# Patient Record
Sex: FEMALE | Race: ASIAN | NOT HISPANIC OR LATINO | URBAN - METROPOLITAN AREA
[De-identification: names, ages, dates, MRNs, and addresses within clinical notes are randomized per-mention and may not be internally consistent; named-entity substitution may affect disease eponyms.]

---

## 2021-09-30 ENCOUNTER — EMERGENCY (EMERGENCY)
Facility: HOSPITAL | Age: 66
LOS: 1 days | Discharge: HOME | End: 2021-09-30
Attending: STUDENT IN AN ORGANIZED HEALTH CARE EDUCATION/TRAINING PROGRAM | Admitting: STUDENT IN AN ORGANIZED HEALTH CARE EDUCATION/TRAINING PROGRAM
Payer: MEDICAID

## 2021-09-30 VITALS
DIASTOLIC BLOOD PRESSURE: 79 MMHG | HEART RATE: 74 BPM | RESPIRATION RATE: 16 BRPM | TEMPERATURE: 98 F | SYSTOLIC BLOOD PRESSURE: 134 MMHG | OXYGEN SATURATION: 97 %

## 2021-09-30 VITALS
RESPIRATION RATE: 18 BRPM | HEART RATE: 85 BPM | DIASTOLIC BLOOD PRESSURE: 75 MMHG | OXYGEN SATURATION: 98 % | TEMPERATURE: 98 F | SYSTOLIC BLOOD PRESSURE: 132 MMHG

## 2021-09-30 LAB
ALBUMIN SERPL ELPH-MCNC: 4.2 G/DL — SIGNIFICANT CHANGE UP (ref 3.3–5)
ALP SERPL-CCNC: 78 U/L — SIGNIFICANT CHANGE UP (ref 40–120)
ALT FLD-CCNC: 20 U/L — SIGNIFICANT CHANGE UP (ref 4–33)
ANION GAP SERPL CALC-SCNC: 14 MMOL/L — SIGNIFICANT CHANGE UP (ref 7–14)
ANION GAP SERPL CALC-SCNC: 15 MMOL/L — HIGH (ref 7–14)
AST SERPL-CCNC: 22 U/L — SIGNIFICANT CHANGE UP (ref 4–32)
B PERT DNA SPEC QL NAA+PROBE: SIGNIFICANT CHANGE UP
B PERT+PARAPERT DNA PNL SPEC NAA+PROBE: SIGNIFICANT CHANGE UP
BASOPHILS # BLD AUTO: 0 K/UL — SIGNIFICANT CHANGE UP (ref 0–0.2)
BASOPHILS NFR BLD AUTO: 0 % — SIGNIFICANT CHANGE UP (ref 0–2)
BILIRUB SERPL-MCNC: <0.2 MG/DL — SIGNIFICANT CHANGE UP (ref 0.2–1.2)
BORDETELLA PARAPERTUSSIS (RAPRVP): SIGNIFICANT CHANGE UP
BUN SERPL-MCNC: 28 MG/DL — HIGH (ref 7–23)
BUN SERPL-MCNC: 31 MG/DL — HIGH (ref 7–23)
C PNEUM DNA SPEC QL NAA+PROBE: SIGNIFICANT CHANGE UP
CALCIUM SERPL-MCNC: 7.9 MG/DL — LOW (ref 8.4–10.5)
CALCIUM SERPL-MCNC: 9 MG/DL — SIGNIFICANT CHANGE UP (ref 8.4–10.5)
CHLORIDE SERPL-SCNC: 103 MMOL/L — SIGNIFICANT CHANGE UP (ref 98–107)
CHLORIDE SERPL-SCNC: 108 MMOL/L — HIGH (ref 98–107)
CO2 SERPL-SCNC: 18 MMOL/L — LOW (ref 22–31)
CO2 SERPL-SCNC: 19 MMOL/L — LOW (ref 22–31)
CREAT SERPL-MCNC: 1.5 MG/DL — HIGH (ref 0.5–1.3)
CREAT SERPL-MCNC: 1.67 MG/DL — HIGH (ref 0.5–1.3)
EOSINOPHIL # BLD AUTO: 0.22 K/UL — SIGNIFICANT CHANGE UP (ref 0–0.5)
EOSINOPHIL NFR BLD AUTO: 3.4 % — SIGNIFICANT CHANGE UP (ref 0–6)
FLUAV SUBTYP SPEC NAA+PROBE: SIGNIFICANT CHANGE UP
FLUBV RNA SPEC QL NAA+PROBE: SIGNIFICANT CHANGE UP
GLUCOSE SERPL-MCNC: 75 MG/DL — SIGNIFICANT CHANGE UP (ref 70–99)
GLUCOSE SERPL-MCNC: 87 MG/DL — SIGNIFICANT CHANGE UP (ref 70–99)
HADV DNA SPEC QL NAA+PROBE: SIGNIFICANT CHANGE UP
HCOV 229E RNA SPEC QL NAA+PROBE: SIGNIFICANT CHANGE UP
HCOV HKU1 RNA SPEC QL NAA+PROBE: SIGNIFICANT CHANGE UP
HCOV NL63 RNA SPEC QL NAA+PROBE: SIGNIFICANT CHANGE UP
HCOV OC43 RNA SPEC QL NAA+PROBE: SIGNIFICANT CHANGE UP
HCT VFR BLD CALC: 38.5 % — SIGNIFICANT CHANGE UP (ref 34.5–45)
HGB BLD-MCNC: 12.8 G/DL — SIGNIFICANT CHANGE UP (ref 11.5–15.5)
HMPV RNA SPEC QL NAA+PROBE: SIGNIFICANT CHANGE UP
HPIV1 RNA SPEC QL NAA+PROBE: SIGNIFICANT CHANGE UP
HPIV2 RNA SPEC QL NAA+PROBE: SIGNIFICANT CHANGE UP
HPIV3 RNA SPEC QL NAA+PROBE: DETECTED
HPIV4 RNA SPEC QL NAA+PROBE: SIGNIFICANT CHANGE UP
IANC: 2.79 K/UL — SIGNIFICANT CHANGE UP (ref 1.5–8.5)
LYMPHOCYTES # BLD AUTO: 1.93 K/UL — SIGNIFICANT CHANGE UP (ref 1–3.3)
LYMPHOCYTES # BLD AUTO: 29.9 % — SIGNIFICANT CHANGE UP (ref 13–44)
M PNEUMO DNA SPEC QL NAA+PROBE: SIGNIFICANT CHANGE UP
MCHC RBC-ENTMCNC: 28 PG — SIGNIFICANT CHANGE UP (ref 27–34)
MCHC RBC-ENTMCNC: 33.2 GM/DL — SIGNIFICANT CHANGE UP (ref 32–36)
MCV RBC AUTO: 84.2 FL — SIGNIFICANT CHANGE UP (ref 80–100)
MONOCYTES # BLD AUTO: 0.99 K/UL — HIGH (ref 0–0.9)
MONOCYTES NFR BLD AUTO: 15.4 % — HIGH (ref 2–14)
NEUTROPHILS # BLD AUTO: 3.03 K/UL — SIGNIFICANT CHANGE UP (ref 1.8–7.4)
NEUTROPHILS NFR BLD AUTO: 47 % — SIGNIFICANT CHANGE UP (ref 43–77)
PLATELET # BLD AUTO: 192 K/UL — SIGNIFICANT CHANGE UP (ref 150–400)
POTASSIUM SERPL-MCNC: 3.4 MMOL/L — LOW (ref 3.5–5.3)
POTASSIUM SERPL-MCNC: 3.6 MMOL/L — SIGNIFICANT CHANGE UP (ref 3.5–5.3)
POTASSIUM SERPL-SCNC: 3.4 MMOL/L — LOW (ref 3.5–5.3)
POTASSIUM SERPL-SCNC: 3.6 MMOL/L — SIGNIFICANT CHANGE UP (ref 3.5–5.3)
PROT SERPL-MCNC: 7.4 G/DL — SIGNIFICANT CHANGE UP (ref 6–8.3)
RAPID RVP RESULT: DETECTED
RBC # BLD: 4.57 M/UL — SIGNIFICANT CHANGE UP (ref 3.8–5.2)
RBC # FLD: 15 % — HIGH (ref 10.3–14.5)
RSV RNA SPEC QL NAA+PROBE: SIGNIFICANT CHANGE UP
RV+EV RNA SPEC QL NAA+PROBE: SIGNIFICANT CHANGE UP
SARS-COV-2 RNA SPEC QL NAA+PROBE: SIGNIFICANT CHANGE UP
SODIUM SERPL-SCNC: 137 MMOL/L — SIGNIFICANT CHANGE UP (ref 135–145)
SODIUM SERPL-SCNC: 140 MMOL/L — SIGNIFICANT CHANGE UP (ref 135–145)
TROPONIN T, HIGH SENSITIVITY RESULT: 16 NG/L — SIGNIFICANT CHANGE UP
TROPONIN T, HIGH SENSITIVITY RESULT: 16 NG/L — SIGNIFICANT CHANGE UP
WBC # BLD: 6.45 K/UL — SIGNIFICANT CHANGE UP (ref 3.8–10.5)
WBC # FLD AUTO: 6.45 K/UL — SIGNIFICANT CHANGE UP (ref 3.8–10.5)

## 2021-09-30 PROCEDURE — 99285 EMERGENCY DEPT VISIT HI MDM: CPT

## 2021-09-30 PROCEDURE — 71045 X-RAY EXAM CHEST 1 VIEW: CPT | Mod: 26

## 2021-09-30 RX ORDER — ASPIRIN/CALCIUM CARB/MAGNESIUM 324 MG
324 TABLET ORAL ONCE
Refills: 0 | Status: DISCONTINUED | OUTPATIENT
Start: 2021-09-30 | End: 2021-09-30

## 2021-09-30 RX ORDER — SODIUM CHLORIDE 9 MG/ML
1000 INJECTION INTRAMUSCULAR; INTRAVENOUS; SUBCUTANEOUS ONCE
Refills: 0 | Status: COMPLETED | OUTPATIENT
Start: 2021-09-30 | End: 2021-09-30

## 2021-09-30 RX ORDER — POTASSIUM CHLORIDE 20 MEQ
40 PACKET (EA) ORAL ONCE
Refills: 0 | Status: COMPLETED | OUTPATIENT
Start: 2021-09-30 | End: 2021-09-30

## 2021-09-30 RX ADMIN — SODIUM CHLORIDE 1000 MILLILITER(S): 9 INJECTION INTRAMUSCULAR; INTRAVENOUS; SUBCUTANEOUS at 19:37

## 2021-09-30 RX ADMIN — Medication 40 MILLIEQUIVALENT(S): at 19:37

## 2021-09-30 NOTE — ED PROVIDER NOTE - OBJECTIVE STATEMENT
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 Denies any objective fevers, CP (contrary to triage note), abdominal pain, nausea, vomiting, diarrhea, constipation, bloody stools, dysuric symptoms. Denies any prior history of PE/DVT, no recent surg/hosp, denies any calf tenderness/swelling/erythema.  passed away one week ago from "chest infection." No prior history of cardiac disease/stents/MI.

## 2021-09-30 NOTE — ED ADULT NURSE NOTE - NSIMPLEMENTINTERV_GEN_ALL_ED
Implemented All Universal Safety Interventions:  [place] to call system. Call bell, personal items and telephone within reach. Instruct patient to call for assistance. Room bathroom lighting operational. Non-slip footwear when patient is off stretcher. Physically safe environment: no spills, clutter or unnecessary equipment. Stretcher in lowest position, wheels locked, appropriate side rails in place.

## 2021-09-30 NOTE — ED ADULT NURSE NOTE - OBJECTIVE STATEMENT
Patient arrives to room 10, A&Ox4 , ambulatory at baseline , c/o shortness of breath and occasional cough , breathing is even and unlabored, wheezing noted on expiration. Pallor/diaphoresis not noted. Denies chest pain, nausea, vomiting or diarrhea. 18G IV placed in L AC. vital signs as charted. Patient pending CXR, and COVID swab results.  All safety maintained, continuing to monitor.

## 2021-09-30 NOTE — ED PROVIDER NOTE - ATTENDING CONTRIBUTION TO CARE
HISTORY OF PRESENT ILLNESS  Andrey Pereira is a 15 year old male presenting with a cough which is productive of yellow sputum for 3 weeks. Patient does not have a fever. He does not have a sore throat any sinus congestion or fever.    REVIEW OF SYSTEMS  All other body systems have been reviewed with patient and are found to be negative.      PHYSICAL EXAMINATION  Blood pressure 121/76, pulse 92, temperature 97.8 °F (36.6 °C), temperature source Temporal Artery, resp. rate 18, height 6' (1.829 m), weight (!) 102.1 kg, SpO2 97 %.    Patient alert and oriented to person, place and time and is in no acute distress.    HEENT:    Head:  Within normal limits  Eyes:  Within normal limits  Ears:  Tympanic membranes without injection  Nose:  Congestion  Sinuses: No pain over maxillary or frontal sinuses.    Throat:  Normal  Neck:  Shotty nodes  Lungs:  Coarse upper airway sounds, bases clear to auscultation. No use of accessory muscles on respiration.     ASSESSMENT:  1. Bronchitis          PLAN:  See patient instructions and Medications.          
I have personally performed a face to face medical and diagnostic evaluation of the patient. I have discussed with and reviewed the Resident's note and agree with the History, ROS, Physical Exam and MDM unless otherwise indicated. A brief summary of my personal evaluation and impression can be found below.    65yo F hx htn hld dm, migratory polyarthritis chronically on prednisone w/ 4 days fever, chills, cough, sob,  congestion. Entire family had similar sx and  passed away 1 week ago for similar dz concerning family prompting ED eval.  No cp, abd pain, back pain, n/v/d, bloody stools, or prior dvt/PE.  VITALS: Initial triage and subsequent vitals have been reviewed by me.  Gen: Well appearing, NAD, alert, non-toxic  Head: NCAT  HEENT: MMM, normal conjunctiva, anicteric, neck supple  Lung: CTAB, no adventitious sounds  CV: RRR, no murmurs, 2+symmetric peripheral pulses  Abd: soft, NTND, no rebound or guarding, no palpable masses  MSK: No edema, no visible deformities  Neuro: Moving all extremity grossly, following commands appropriately, fluid speech  Skin: Warm and dry, no evidence of rash  Psych: normal mood and affect  Constitution of viral symptoms, well appearing HDS will get labs, XR r/o pna and rvp and reassess

## 2021-09-30 NOTE — ED PROVIDER NOTE - NS ED ROS FT
Constitution: (+) Subjective Fever or chills, No Weight Loss,   Eyes: No visual changes  HEENT: (+) cough, No Discharge, (+) Rhinorrhea, (+) URI symptoms  Cardio: No Chest pain, No Palpitations, (+) Dyspnea  Resp: (+) SOB, No Wheezing  GI: No abdominal pain, No Nausea, No Vomiting, No Constipation, No Diarrhea  : No burning upon urination, trouble urinating, no foul odor from urine  MSK: No Back pain, No Numbness, No Tingling, No Weakness  Neuro: No Headache, Normal Gait  Skin: No rashes, No Bruising, No Swelling

## 2021-09-30 NOTE — ED PROVIDER NOTE - PHYSICAL EXAMINATION
GEN - NAD; non-toxic; A+Ox3, speaking full sentences, steady gait   HENT - NC/AT, No visible Ecchymosis, No Abrasions, No Lac/Tears, MMM, no discharge  EYES - EOMI, no conjunctival pallor, no scleral icterus  NECK - Neck supple, No LAD, No Swelling  PULM - Coarse B/L,  (+) trace exp wheeze; symmetric breath sounds; 100% on RA, No Accessory Muscle Use  CV -  RRR, S1 S2, no murmurs 2+ Pulses B/L UE  GI - (-) Rooney's, (-) Rovsings, (-) McBurneys; NT/ND, soft, no guarding, no rebound, no masses    MSK/EXT- no CVA tenderness; no edema, no gross deformity, warm and well perfused, no calf tenderness/swelling/erythema   SKIN - no rash or bruising  NEUROLOGIC - alert, moving all 4 ext with 5/5 Strength

## 2021-09-30 NOTE — ED ADULT NURSE NOTE - CHIEF COMPLAINT QUOTE
Pt c/o chest pain, shortness of breath, fevers, chills x1 day. Pt diagnosed with flu 4 days ago. PMH DM, HTN. Pt appears comfortable.   Pt son Shamim: [phone]

## 2021-09-30 NOTE — ED PROVIDER NOTE - PROGRESS NOTE DETAILS
Kirk Santiago DO - pt remains well appaering. +RVP for parainfluenza. Pt known to have elevated Cr. Answered all questions, will fu with pcp for repeat blood work this week. Give nreturn precautions.

## 2021-09-30 NOTE — ED PROVIDER NOTE - PATIENT PORTAL LINK FT
You can access the FollowMyHealth Patient Portal offered by [facility] by registering at the following website: [email or web address]. By joining [employer]’s FollowMyHealth portal, you will also be able to view your health information using other applications (apps) compatible with our system.

## 2021-09-30 NOTE — ED PROVIDER NOTE - CLINICAL SUMMARY MEDICAL DECISION MAKING FREE TEXT BOX
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 Exam, presentation, and history concerning for PNA vs. Viral URI - will r/o ACS. Eval is NOT consistent with PE ( no recent surg/hosp, no prior history of, no calf tenderness/swelling/erythema; NOT Hypoxemic, NOT Tachypneic, NOT Tachycardic). Plan: CBC, CMP, EKG, CXR, RVP, Re-eval. VSS, non-toxic.

## 2021-12-10 NOTE — ED ADULT TRIAGE NOTE - NS ED TRIAGE AVPU SCALE
Madelyn Paz  [address] [address]            12/10/2021      Dear Madelyn,    This letter is a reminder that you are due for a colonoscopy. I would like to share some important information about colorectal cancer. Colorectal cancer is a leading cause of cancer death in this country. Colorectal cancer can be stopped in its tracks or detected early with preventative testing. Please call [phone] to schedule an appointment with Dr. Elaine which may be scheduled at [facility] in [place], [facility], or [place].  If you already spoke to someone in the GI Department and have your procedure scheduled you can disregard this letter.       Your good health is important to us-colorectal cancer screening is important for you.     Sincerely,    Dr. Víctor Elaine M.D.  Gastroenterology  [address] [place] [address]   Alert-The patient is alert, awake and responds to voice. The patient is oriented to time, place, and person. The triage nurse is able to obtain subjective information.

## 2024-10-04 ENCOUNTER — INPATIENT (INPATIENT)
Facility: HOSPITAL | Age: 69
LOS: 12 days | Discharge: HOME | End: 2024-10-17
Attending: STUDENT IN AN ORGANIZED HEALTH CARE EDUCATION/TRAINING PROGRAM | Admitting: STUDENT IN AN ORGANIZED HEALTH CARE EDUCATION/TRAINING PROGRAM
Payer: MEDICAID

## 2024-10-04 VITALS
RESPIRATION RATE: 18 BRPM | HEART RATE: 86 BPM | OXYGEN SATURATION: 96 % | WEIGHT: 112.44 LBS | TEMPERATURE: 98 F | SYSTOLIC BLOOD PRESSURE: 77 MMHG | DIASTOLIC BLOOD PRESSURE: 51 MMHG

## 2024-10-04 DIAGNOSIS — R68.89 OTHER GENERAL SYMPTOMS AND SIGNS: ICD-10-CM

## 2024-10-04 DIAGNOSIS — N39.0 URINARY TRACT INFECTION, SITE NOT SPECIFIED: ICD-10-CM

## 2024-10-04 DIAGNOSIS — Z87.39 PERSONAL HISTORY OF OTHER DISEASES OF THE MUSCULOSKELETAL SYSTEM AND CONNECTIVE TISSUE: ICD-10-CM

## 2024-10-04 DIAGNOSIS — I10 ESSENTIAL (PRIMARY) HYPERTENSION: ICD-10-CM

## 2024-10-04 DIAGNOSIS — E87.8 OTHER DISORDERS OF ELECTROLYTE AND FLUID BALANCE, NOT ELSEWHERE CLASSIFIED: ICD-10-CM

## 2024-10-04 DIAGNOSIS — N17.9 ACUTE KIDNEY FAILURE, UNSPECIFIED: ICD-10-CM

## 2024-10-04 DIAGNOSIS — E11.9 TYPE 2 DIABETES MELLITUS WITHOUT COMPLICATIONS: ICD-10-CM

## 2024-10-04 DIAGNOSIS — E78.5 HYPERLIPIDEMIA, UNSPECIFIED: ICD-10-CM

## 2024-10-04 DIAGNOSIS — A41.9 SEPSIS, UNSPECIFIED ORGANISM: ICD-10-CM

## 2024-10-04 LAB
ALBUMIN SERPL ELPH-MCNC: 3.1 G/DL — LOW (ref 3.3–5)
ALP SERPL-CCNC: 86 U/L — SIGNIFICANT CHANGE UP (ref 40–120)
ALT FLD-CCNC: 17 U/L — SIGNIFICANT CHANGE UP (ref 4–33)
ANION GAP SERPL CALC-SCNC: 15 MMOL/L — HIGH (ref 7–14)
ANION GAP SERPL CALC-SCNC: 15 MMOL/L — HIGH (ref 7–14)
ANISOCYTOSIS BLD QL: SLIGHT — SIGNIFICANT CHANGE UP
APPEARANCE UR: ABNORMAL
APTT BLD: 33.3 SEC — SIGNIFICANT CHANGE UP (ref 24.5–35.6)
AST SERPL-CCNC: 22 U/L — SIGNIFICANT CHANGE UP (ref 4–32)
BACTERIA # UR AUTO: ABNORMAL /HPF
BASOPHILS # BLD AUTO: 0 K/UL — SIGNIFICANT CHANGE UP (ref 0–0.2)
BASOPHILS NFR BLD AUTO: 0 % — SIGNIFICANT CHANGE UP (ref 0–2)
BILIRUB SERPL-MCNC: 0.4 MG/DL — SIGNIFICANT CHANGE UP (ref 0.2–1.2)
BILIRUB UR-MCNC: NEGATIVE — SIGNIFICANT CHANGE UP
BLOOD GAS VENOUS COMPREHENSIVE RESULT: SIGNIFICANT CHANGE UP
BUN SERPL-MCNC: 68 MG/DL — HIGH (ref 7–23)
BUN SERPL-MCNC: 74 MG/DL — HIGH (ref 7–23)
BURR CELLS BLD QL SMEAR: PRESENT — SIGNIFICANT CHANGE UP
CA-I BLD-SCNC: 0.96 MMOL/L — LOW (ref 1.15–1.29)
CALCIUM SERPL-MCNC: 7.1 MG/DL — LOW (ref 8.4–10.5)
CALCIUM SERPL-MCNC: 7.8 MG/DL — LOW (ref 8.4–10.5)
CAST: 0 /LPF — SIGNIFICANT CHANGE UP (ref 0–4)
CHLORIDE SERPL-SCNC: 103 MMOL/L — SIGNIFICANT CHANGE UP (ref 98–107)
CHLORIDE SERPL-SCNC: 98 MMOL/L — SIGNIFICANT CHANGE UP (ref 98–107)
CO2 SERPL-SCNC: 14 MMOL/L — LOW (ref 22–31)
CO2 SERPL-SCNC: 15 MMOL/L — LOW (ref 22–31)
COLOR SPEC: YELLOW — SIGNIFICANT CHANGE UP
CREAT SERPL-MCNC: 3.3 MG/DL — HIGH (ref 0.5–1.3)
CREAT SERPL-MCNC: 3.37 MG/DL — HIGH (ref 0.5–1.3)
DIFF PNL FLD: ABNORMAL
EGFR: 14 ML/MIN/1.73M2 — LOW
EGFR: 15 ML/MIN/1.73M2 — LOW
EOSINOPHIL # BLD AUTO: 0 K/UL — SIGNIFICANT CHANGE UP (ref 0–0.5)
EOSINOPHIL NFR BLD AUTO: 0 % — SIGNIFICANT CHANGE UP (ref 0–6)
FLUAV AG NPH QL: SIGNIFICANT CHANGE UP
FLUBV AG NPH QL: SIGNIFICANT CHANGE UP
GIANT PLATELETS BLD QL SMEAR: PRESENT — SIGNIFICANT CHANGE UP
GLUCOSE SERPL-MCNC: 146 MG/DL — HIGH (ref 70–99)
GLUCOSE SERPL-MCNC: 190 MG/DL — HIGH (ref 70–99)
GLUCOSE UR QL: NEGATIVE MG/DL — SIGNIFICANT CHANGE UP
HCT VFR BLD CALC: 29.4 % — LOW (ref 34.5–45)
HGB BLD-MCNC: 10 G/DL — LOW (ref 11.5–15.5)
IANC: 17.89 K/UL — HIGH (ref 1.8–7.4)
INR BLD: 1.19 RATIO — HIGH (ref 0.85–1.16)
KETONES UR-MCNC: NEGATIVE MG/DL — SIGNIFICANT CHANGE UP
LACTATE SERPL-SCNC: 1.4 MMOL/L — SIGNIFICANT CHANGE UP (ref 0.5–2)
LEUKOCYTE ESTERASE UR-ACNC: ABNORMAL
LYMPHOCYTES # BLD AUTO: 0 % — LOW (ref 13–44)
LYMPHOCYTES # BLD AUTO: 0 K/UL — LOW (ref 1–3.3)
MAGNESIUM SERPL-MCNC: 2.1 MG/DL — SIGNIFICANT CHANGE UP (ref 1.6–2.6)
MANUAL SMEAR VERIFICATION: SIGNIFICANT CHANGE UP
MCHC RBC-ENTMCNC: 30.2 PG — SIGNIFICANT CHANGE UP (ref 27–34)
MCHC RBC-ENTMCNC: 34 GM/DL — SIGNIFICANT CHANGE UP (ref 32–36)
MCV RBC AUTO: 88.8 FL — SIGNIFICANT CHANGE UP (ref 80–100)
MONOCYTES # BLD AUTO: 0.52 K/UL — SIGNIFICANT CHANGE UP (ref 0–0.9)
MONOCYTES NFR BLD AUTO: 2.6 % — SIGNIFICANT CHANGE UP (ref 2–14)
NEUTROPHILS # BLD AUTO: 18.99 K/UL — HIGH (ref 1.8–7.4)
NEUTROPHILS NFR BLD AUTO: 88.7 % — HIGH (ref 43–77)
NEUTS BAND # BLD: 6.1 % — HIGH (ref 0–6)
NITRITE UR-MCNC: POSITIVE
OVALOCYTES BLD QL SMEAR: SLIGHT — SIGNIFICANT CHANGE UP
PH UR: 6 — SIGNIFICANT CHANGE UP (ref 5–8)
PHOSPHATE SERPL-MCNC: 2.5 MG/DL — SIGNIFICANT CHANGE UP (ref 2.5–4.5)
PLAT MORPH BLD: NORMAL — SIGNIFICANT CHANGE UP
PLATELET # BLD AUTO: 160 K/UL — SIGNIFICANT CHANGE UP (ref 150–400)
PLATELET COUNT - ESTIMATE: NORMAL — SIGNIFICANT CHANGE UP
POIKILOCYTOSIS BLD QL AUTO: SLIGHT — SIGNIFICANT CHANGE UP
POLYCHROMASIA BLD QL SMEAR: SLIGHT — SIGNIFICANT CHANGE UP
POTASSIUM SERPL-MCNC: 3.6 MMOL/L — SIGNIFICANT CHANGE UP (ref 3.5–5.3)
POTASSIUM SERPL-MCNC: 4.1 MMOL/L — SIGNIFICANT CHANGE UP (ref 3.5–5.3)
POTASSIUM SERPL-SCNC: 3.6 MMOL/L — SIGNIFICANT CHANGE UP (ref 3.5–5.3)
POTASSIUM SERPL-SCNC: 4.1 MMOL/L — SIGNIFICANT CHANGE UP (ref 3.5–5.3)
PROT SERPL-MCNC: 6 G/DL — SIGNIFICANT CHANGE UP (ref 6–8.3)
PROT UR-MCNC: 100 MG/DL
PROTHROM AB SERPL-ACNC: 13.8 SEC — HIGH (ref 9.9–13.4)
PTH-INTACT FLD-MCNC: 187 PG/ML — HIGH (ref 15–65)
RBC # BLD: 3.31 M/UL — LOW (ref 3.8–5.2)
RBC # FLD: 14.9 % — HIGH (ref 10.3–14.5)
RBC BLD AUTO: ABNORMAL
RBC CASTS # UR COMP ASSIST: 290 /HPF — HIGH (ref 0–4)
RSV RNA NPH QL NAA+NON-PROBE: SIGNIFICANT CHANGE UP
SARS-COV-2 RNA SPEC QL NAA+PROBE: SIGNIFICANT CHANGE UP
SODIUM SERPL-SCNC: 128 MMOL/L — LOW (ref 135–145)
SODIUM SERPL-SCNC: 132 MMOL/L — LOW (ref 135–145)
SP GR SPEC: 1.01 — SIGNIFICANT CHANGE UP (ref 1–1.03)
SQUAMOUS # UR AUTO: 2 /HPF — SIGNIFICANT CHANGE UP (ref 0–5)
TROPONIN T, HIGH SENSITIVITY RESULT: 46 NG/L — SIGNIFICANT CHANGE UP
UROBILINOGEN FLD QL: 0.2 MG/DL — SIGNIFICANT CHANGE UP (ref 0.2–1)
VARIANT LYMPHS # BLD: 2.6 % — SIGNIFICANT CHANGE UP (ref 0–6)
WBC # BLD: 20.03 K/UL — HIGH (ref 3.8–10.5)
WBC # FLD AUTO: 20.03 K/UL — HIGH (ref 3.8–10.5)
WBC UR QL: 277 /HPF — HIGH (ref 0–5)

## 2024-10-04 PROCEDURE — 71045 X-RAY EXAM CHEST 1 VIEW: CPT | Mod: 26

## 2024-10-04 PROCEDURE — 99223 1ST HOSP IP/OBS HIGH 75: CPT

## 2024-10-04 PROCEDURE — 99285 EMERGENCY DEPT VISIT HI MDM: CPT

## 2024-10-04 RX ORDER — ACETAMINOPHEN 325 MG
650 TABLET ORAL EVERY 6 HOURS
Refills: 0 | Status: DISCONTINUED | OUTPATIENT
Start: 2024-10-04 | End: 2024-10-17

## 2024-10-04 RX ORDER — GLUCAGON INJECTION, SOLUTION 0.5 MG/.1ML
1 INJECTION, SOLUTION SUBCUTANEOUS ONCE
Refills: 0 | Status: DISCONTINUED | OUTPATIENT
Start: 2024-10-04 | End: 2024-10-17

## 2024-10-04 RX ORDER — INSULIN LISPRO 100/ML
VIAL (ML) SUBCUTANEOUS
Refills: 0 | Status: DISCONTINUED | OUTPATIENT
Start: 2024-10-04 | End: 2024-10-17

## 2024-10-04 RX ORDER — ONDANSETRON HCL/PF 4 MG/2 ML
4 VIAL (ML) INJECTION EVERY 8 HOURS
Refills: 0 | Status: DISCONTINUED | OUTPATIENT
Start: 2024-10-04 | End: 2024-10-17

## 2024-10-04 RX ORDER — MEROPENEM 500 MG/20ML
500 INJECTION INTRAVENOUS EVERY 12 HOURS
Refills: 0 | Status: DISCONTINUED | OUTPATIENT
Start: 2024-10-04 | End: 2024-10-05

## 2024-10-04 RX ORDER — ACETAMINOPHEN 325 MG
650 TABLET ORAL ONCE
Refills: 0 | Status: COMPLETED | OUTPATIENT
Start: 2024-10-04 | End: 2024-10-04

## 2024-10-04 RX ORDER — HYDROCORTISONE 5 MG/1
100 TABLET ORAL ONCE
Refills: 0 | Status: COMPLETED | OUTPATIENT
Start: 2024-10-04 | End: 2024-10-04

## 2024-10-04 RX ORDER — ALCOHOL ANTISEPTIC PADS
25 PADS, MEDICATED (EA) TOPICAL ONCE
Refills: 0 | Status: DISCONTINUED | OUTPATIENT
Start: 2024-10-04 | End: 2024-10-17

## 2024-10-04 RX ORDER — SODIUM CHLORIDE 0.9 % (FLUSH) 0.9 %
1600 SYRINGE (ML) INJECTION ONCE
Refills: 0 | Status: COMPLETED | OUTPATIENT
Start: 2024-10-04 | End: 2024-10-04

## 2024-10-04 RX ORDER — PSYLLIUM HUSK 0.4 G
400 CAPSULE ORAL DAILY
Refills: 0 | Status: DISCONTINUED | OUTPATIENT
Start: 2024-10-04 | End: 2024-10-17

## 2024-10-04 RX ORDER — SODIUM CHLORIDE IRRIG SOLUTION 0.9 %
1000 SOLUTION, IRRIGATION IRRIGATION
Refills: 0 | Status: DISCONTINUED | OUTPATIENT
Start: 2024-10-04 | End: 2024-10-17

## 2024-10-04 RX ORDER — VANCOMYCIN HCL-SODIUM CHLORIDE IV SOLN 1.5 GM/250ML-0.9% 1.5-0.9/25 GM/ML-%
1000 SOLUTION INTRAVENOUS ONCE
Refills: 0 | Status: DISCONTINUED | OUTPATIENT
Start: 2024-10-04 | End: 2024-10-04

## 2024-10-04 RX ORDER — HYDROCORTISONE 5 MG/1
50 TABLET ORAL EVERY 6 HOURS
Refills: 0 | Status: DISCONTINUED | OUTPATIENT
Start: 2024-10-05 | End: 2024-10-07

## 2024-10-04 RX ORDER — 5-HYDROXYTRYPTOPHAN (5-HTP) 100 MG
3 TABLET,DISINTEGRATING ORAL AT BEDTIME
Refills: 0 | Status: DISCONTINUED | OUTPATIENT
Start: 2024-10-04 | End: 2024-10-17

## 2024-10-04 RX ORDER — ALCOHOL ANTISEPTIC PADS
15 PADS, MEDICATED (EA) TOPICAL ONCE
Refills: 0 | Status: DISCONTINUED | OUTPATIENT
Start: 2024-10-04 | End: 2024-10-17

## 2024-10-04 RX ORDER — MEROPENEM 500 MG/20ML
1000 INJECTION INTRAVENOUS ONCE
Refills: 0 | Status: COMPLETED | OUTPATIENT
Start: 2024-10-04 | End: 2024-10-04

## 2024-10-04 RX ORDER — MAG HYDROX/ALUMINUM HYD/SIMETH 200-200-20
30 SUSPENSION, ORAL (FINAL DOSE FORM) ORAL EVERY 4 HOURS
Refills: 0 | Status: DISCONTINUED | OUTPATIENT
Start: 2024-10-04 | End: 2024-10-17

## 2024-10-04 RX ORDER — PSYLLIUM HUSK 0.4 G
1 CAPSULE ORAL
Refills: 0 | DISCHARGE

## 2024-10-04 RX ORDER — INSULIN LISPRO 100/ML
VIAL (ML) SUBCUTANEOUS AT BEDTIME
Refills: 0 | Status: DISCONTINUED | OUTPATIENT
Start: 2024-10-04 | End: 2024-10-17

## 2024-10-04 RX ORDER — ALCOHOL ANTISEPTIC PADS
12.5 PADS, MEDICATED (EA) TOPICAL ONCE
Refills: 0 | Status: DISCONTINUED | OUTPATIENT
Start: 2024-10-04 | End: 2024-10-17

## 2024-10-04 RX ORDER — SODIUM BICARBONATE 650 MG
0.11 TABLET ORAL
Qty: 75 | Refills: 0 | Status: DISCONTINUED | OUTPATIENT
Start: 2024-10-04 | End: 2024-10-07

## 2024-10-04 RX ADMIN — Medication 650 MILLIGRAM(S): at 20:54

## 2024-10-04 RX ADMIN — Medication 650 MILLIGRAM(S): at 14:58

## 2024-10-04 RX ADMIN — Medication 1600 MILLILITER(S): at 13:31

## 2024-10-04 RX ADMIN — Medication 75 MEQ/KG/HR: at 21:37

## 2024-10-04 RX ADMIN — HYDROCORTISONE 100 MILLIGRAM(S): 5 TABLET ORAL at 20:54

## 2024-10-04 RX ADMIN — MEROPENEM 100 MILLIGRAM(S): 500 INJECTION INTRAVENOUS at 13:31

## 2024-10-04 NOTE — H&P ADULT - NSHPSOCIALHISTORY_GEN_ALL_CORE
Does not use tobacco products, consume alcohol or partake in illicit drug use   Moved from [facility] 6 months ago

## 2024-10-04 NOTE — ED ADULT TRIAGE NOTE - CHIEF COMPLAINT QUOTE
Pt. c/o sob, chills, nausea, vomiting and fevers since yesterday. phx: HTN, CKD Pt. c/o sob, chills, nausea, vomiting and fevers since yesterday. phx: HTN, CKD. Pt. hypotensive in triage.

## 2024-10-04 NOTE — H&P ADULT - NSHPLABSRESULTS_GEN_ALL_CORE
10.0   20.03 )-----------( 160      ( 04 Oct 2024 12:45 )             29.4     132[L]  |  103  |  68[H]  ----------------------------<  146[H]     10-04  3.6   |  14[L]  |  3.30[H]    Ca    7.1[L]      04 Oct 2024 18:55  Phos  2.5     10-04  Mg     2.10     10-04    TPro  6.0  /  Alb  3.1[L]  /  TBili  0.4  /  DBili  x   /  AST  22  /  ALT  17  /  AlkPhos  86  10-04    PT/INR: 13.8/1.19 (10-04-24 @ 12:45)  PTT: 33.3 (10-04-24 @ 12:45)      12:45 - VBG - pH: 7.33  | pCO2: 34    | pO2: 48    | Lactate: 1.4            hs Troponin, T - 46 ng/L (10-04-24 @ 12:45)              Urinalysis Basic - ( 04 Oct 2024 15:30 )  Color: Yellow / Appearance: Turbid / S.012 / pH: 6.0  Gluc: Negative mg/dL / Ketone: Negative mg/dL  / Bili: Negative / Urobili: 0.2 mg/dL   Blood: Large / Protein: 100 mg/dL / Nitrite: Positive   Leuk Esterase: Large / RBC: 290 /HPF /  /HPF   Sq Epi: 2 /HPF / Bacteria: Many /HPF  Hyaline Casts: x/WBC Casts: x          Urinalysis with Rflx Culture (collected 04 Oct 2024 15:30)    CXR interpreted by myself:  The cardiac silhouette is normal in size. There is elevation of the right hemidiaphragm. There are no focal consolidations or pleural effusions. The hilar and mediastinal structures appear unremarkable. The osseous structures are intact.    EKG interpreted by myself: nsr

## 2024-10-04 NOTE — ED ADULT TRIAGE NOTE - TEMP AT ED ARRIVAL (C)
No new care gaps identified.  [facility] Embedded Care Gaps. Reference number: [id number]. 6/29/2022   3:33:43 PM CDT   36.7

## 2024-10-04 NOTE — H&P ADULT - HISTORY OF PRESENT ILLNESS
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 Also endorsing intermittent SOB  Denies  headache, dizziness, chest pain, palpitations,  abdominal pain, joint pain, diarrhea/constipation.  Vitals: T 1015. 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 Also endorsing intermittent SOB  Denies  headache, dizziness, chest pain, palpitations,  abdominal pain, joint pain, diarrhea/constipation.  Vitals: T 1015. , BP 77/51-> 92/48,  satting 95% RA

## 2024-10-04 NOTE — H&P ADULT - PROBLEM SELECTOR PLAN 5
Chronic unstable as hypotensive on presentation   Hold home losartan 25mg daily   Monitor and restart medications as appropriate Chronic unstable as hypotensive on presentation   Hold home losartan 25mg daily, pazosin 1mg nightly   Monitor and restart medications as appropriate

## 2024-10-04 NOTE — H&P ADULT - NSICDXPASTMEDICALHX_GEN_ALL_CORE_FT
PAST MEDICAL HISTORY:  Benign essential HTN     Current chronic use of systemic steroids     H/O migratory polyarthritis     HLD (hyperlipidemia)     Stage 3 chronic kidney disease     T2DM (type 2 diabetes mellitus)      PAST MEDICAL HISTORY:  Benign essential HTN     Current chronic use of systemic steroids     H/O migratory polyarthritis     HLD (hyperlipidemia)     Hypothyroidism     Stage 3 chronic kidney disease     T2DM (type 2 diabetes mellitus)

## 2024-10-04 NOTE — H&P ADULT - PROBLEM SELECTOR PROBLEM 1
Sepsis
I personally performed the services described in the documentation, reviewed the documentation recorded by the scribe in my presence and it accurately and completely records my words and action.

## 2024-10-04 NOTE — H&P ADULT - PROBLEM SELECTOR PLAN 6
Chronic stable  Pt on prednisolone ~2mg daily - she cuts a 5mg tablet in half and "takes a little less"- she is self reducing her dose  Stress dose steroids as above

## 2024-10-04 NOTE — H&P ADULT - NSHPPHYSICALEXAM_GEN_ALL_CORE
PHYSICAL EXAM:  GENERAL: NAD, comfortable at bedside   HEAD:  Atraumatic, Normocephalic  EYES: EOMI, PERRL, conjunctiva and sclera clear  NECK: Supple, No JVD  CHEST/LUNG: Clear to auscultation bilaterally; No wheezes, rales or rhonchi  HEART: tachycardic ; No murmurs, rubs, or gallops, (+)S1, S2  ABDOMEN: Soft, Nontender, Nondistended; Normal Bowel sounds   EXTREMITIES:  2+ Peripheral Pulses, No clubbing, cyanosis, or edema  PSYCH: normal mood and affect  NEUROLOGY: AAOx3, moving all extremities spontaneously   SKIN: No rashes or lesions

## 2024-10-04 NOTE — CONSULT NOTE ADULT - ASSESSMENT
69-year-old female comes into the ER with   Symptoms since yesterday of fevers up to 102, cough general malaise weakness rigors.  nephrology consulted for acute on ckd and electrolyte anonymities    acute on ckd stage 3  baseline ~ 1.5  MICHAEL possible prerenal vs ATN  r/o obstruction check renal us  check urine na and osmo  hold ARB  s/p IVF in ed  monitor bmp  avoid nephrotoxic agents    hyponatremia  acute asymptomatic  possible dehydration   check urine na and osmo  s/p NS  check repeat bmp  avoid overcorrection, na should not correct >8meq in 24 hrs    fever  work up per team    acidosis  non AG  consider 1/2ns +75bicarb @75cc/hr x1day  monitor    hypocalcemia  check pth, vit d 25  monitor    proteinuria  check urine p/c ratio  known Arthritis and Sjogren syndrome follows rheum  being followed in office     hypotensive  hold all bp meds  s/p ivf  sepsis work up

## 2024-10-04 NOTE — ED PROVIDER NOTE - CLINICAL SUMMARY MEDICAL DECISION MAKING FREE TEXT BOX
Evaluation for elderly female with hypotension reported fevers at home.  Afebrile here rectally however she did take Tylenol at 9 AM today.  On exam there is no focal source of infection.  No signs of meningitis.  Abdomen soft nontender.  Lungs are clear on auscultation.  No hypoxia.  No respiratory distress.  There was a flu contact in the house last week.  Differential for patient includes viral sepsis, infection pneumonia UTI.  Will evaluate with sepsis workup empiric antibiotics IV fluids and reassessment.

## 2024-10-04 NOTE — H&P ADULT - PROBLEM SELECTOR PLAN 4
New  Na 128, Ca 7.9 (Ionized .96)  Nephrology consulted: possible dehydration. check urine na and osmo. check repeat bmp. avoid overcorrection, na should not correct >8meq in 24 hrs. check pth, vit d 25

## 2024-10-04 NOTE — ED ADULT NURSE NOTE - OBJECTIVE STATEMENT
pt is a 69 yr old female c/o 4 days fever, weakness, body aches with sob/ rodríguez at times, cough. pt appears weak, low BP , increased RR noted, pt placed in stretcher with increased comfort noted, no noted increased wob, stable o2 sat on RA. skin cool, rectal temp 98.4, #20 g piv placed in right ac, labs sent. see emar for tx, family at bedside translating, pt [place] speaking.

## 2024-10-04 NOTE — H&P ADULT - PROBLEM SELECTOR PLAN 3
New  CR 3.37 on presentation with reported Cr ~1.5  Nephrology consulted: MICHAEL possible prerenal vs ATN. r/o obstruction check renal us. check urine na and osmo  renally dose medications and avoid nephrotoxic agents  Strict i/o

## 2024-10-04 NOTE — CONSULT NOTE ADULT - SUBJECTIVE AND OBJECTIVE BOX
[facility]   MD MEDINA WORKMAN MD ANGELA WONG, PA        TEL:  OFFICE: [phone]  From 5pm-7am answering service [phone]    --- INITIAL RENAL CONSULT NOTE ---date of service 10-04-24 @ 17:17    HPI:  69-year-old female comes into the ER with   Symptoms since yesterday of fevers up to 102, cough general malaise weakness rigors.  Last dose of Tylenol was at 9 AM this morning.  No sick contacts at home this week but last week someone in the family had the flu.  Patient is endorsing minimal dysuria.  Low appetite.  Soft stools but no watery diarrhea.  No abdominal pain no chest pain.  No neck stiffness.  No headache.  Patient is complaining of lightheadedness. she has a history of hypertension.  Her baseline creatinine is less than 2   As per blood work that was drawn in September of this year.  pt follows up with Dr. Cochran last visit 2 days ago baseline ckd ~ 1.5    Allergies:  cefixime (Rash; Urticaria; Hives)      PAST MEDICAL & SURGICAL HISTORY:  No pertinent past medical history      No significant past surgical history          Home Medications Reviewed    Hospital Medications:   MEDICATIONS  (STANDING):      SOCIAL HISTORY:  Denies ETOh, Smoking,     FAMILY HISTORY:  No pertinent family history in first degree relatives        REVIEW OF SYSTEMS:  CONSTITUTIONAL: + weakness, fevers or chills  EYES/ENT: No visual changes;  No vertigo or throat pain   NECK: No pain or stiffness  RESPIRATORY: see hpi  CARDIOVASCULAR: No chest pain or palpitations.  GASTROINTESTINAL: see hpi  GENITOURINARY: No dysuria, frequency, foamy urine, urinary urgency, incontinence or hematuria  NEUROLOGICAL: No numbness or weakness  SKIN: No itching, burning, rashes, or lesions   VASCULAR: No bilateral lower extremity edema.   All other review of systems is negative unless indicated above.    VITALS:  T(F): 101.5 (10-04-24 @ 16:48), Max: 101.5 (10-04-24 @ 16:48)  HR: 112 (10-04-24 @ 16:48)  BP: 92/48 (10-04-24 @ 16:48)  RR: 18 (10-04-24 @ 16:48)  SpO2: 94% (10-04-24 @ 16:48)  Wt(kg): --      Weight (kg): 51 (10-04 @ 12:26)    PHYSICAL EXAM:  General: mild distress due to chills  HEENT: anicteric sclera, oropharynx clear, MMM  Neck: No JVD  Respiratory: CTAB, no wheezes, rales or rhonchi  Cardiovascular: S1, S2, RRR  Gastrointestinal: BS+, soft, NT/ND  Extremities: No cyanosis or clubbing. No peripheral edema  Neurological: A/O x 3, no focal deficits  Psychiatric: Normal mood, normal affect  : No CVA tenderness. No stafford.   Skin: No rashes  Vascular Access: none    LABS:  10-04    128[L]  |  98  |  74[H]  ----------------------------<  190[H]  4.1   |  15[L]  |  3.37[H]    Ca    7.8[L]      04 Oct 2024 12:45    TPro  6.0  /  Alb  3.1[L]  /  TBili  0.4  /  DBili      /  AST  22  /  ALT  17  /  AlkPhos  86  10-04    Creatinine Trend: 3.37 <--                        10.0   20.03 )-----------( 160      ( 04 Oct 2024 12:45 )             29.4     Urine Studies:  Urinalysis Basic - ( 04 Oct 2024 15:30 )    Color: Yellow / Appearance: Turbid / S.012 / pH:   Gluc:  / Ketone: Negative mg/dL  / Bili: Negative / Urobili: 0.2 mg/dL   Blood:  / Protein: 100 mg/dL / Nitrite: Positive   Leuk Esterase: Large / RBC: 290 /HPF /  /HPF   Sq Epi:  / Non Sq Epi: 2 /HPF / Bacteria: Many /HPF          RADIOLOGY & ADDITIONAL STUDIES:

## 2024-10-04 NOTE — H&P ADULT - PROBLEM SELECTOR PLAN 7
Chronic moderate exacerbation     Not on diabetic medications at home   LDCS with diabetic diet  A1c and lipid panel in AM

## 2024-10-04 NOTE — H&P ADULT - NSHPREVIEWOFSYSTEMS_GEN_ALL_CORE
REVIEW OF SYSTEMS:    CONSTITUTIONAL: No weakness, +fevers, rigors, decreased PO itnake   EYES/ENT: No visual changes;  No dysphagia; No sore throat; No rhinorrhea; No sinus pain/pressure  NECK: No pain or stiffness  RESPIRATORY: No cough, wheezing, hemoptysis; + shortness of breath  CARDIOVASCULAR: No chest pain or palpitations; No lower extremity edema  GASTROINTESTINAL: No abdominal or epigastric pain. + nausea, vomiting, no hematemesis; No diarrhea or constipation. No melena or hematochezia.  GENITOURINARY: + dysuria, no frequency or hematuria  NEUROLOGICAL: No numbness or weakness  MSK: ambulates without assistance   SKIN: No itching, burning, rashes, or lesions   All other review of systems is negative unless indicated above.

## 2024-10-04 NOTE — ED PROVIDER NOTE - OBJECTIVE STATEMENT
69-year-old female comes into the ER with   Symptoms since yesterday of fevers up to 102, cough general malaise weakness rigors.  Last dose of Tylenol was at 9 AM this morning.  No sick contacts at home this week but last week someone in the family had the flu.  Patient is endorsing minimal dysuria.  Low appetite.  Soft stools but no watery diarrhea.  No abdominal pain no chest pain.  No neck stiffness.  No headache.  Patient is complaining of lightheadedness. she has a history of hypertension.  Her baseline creatinine is less than 2   As per blood work that was drawn in September of this year.

## 2024-10-04 NOTE — ED ADULT NURSE NOTE - NSFALLUNIVINTERV_ED_ALL_ED
Bed/Stretcher in lowest position, wheels locked, appropriate side rails in place/Call bell, personal items and telephone in reach/Instruct patient to call for assistance before getting out of bed/chair/stretcher/Non-slip footwear applied when patient is off stretcher/[place] to call system/Physically safe environment - no spills, clutter or unnecessary equipment/Purposeful proactive rounding/Room/bathroom lighting operational, light cord in reach

## 2024-10-04 NOTE — H&P ADULT - PROBLEM SELECTOR PLAN 1
New  T 1015. , BP 77/51-> 92/48,  satting 95% RA  WBC 20.03, LA 1.4  UA: Nitrite +, LE: large, , , Bacteria: many  s/p meropenem-> will continue as son/daughter in law both state cipro allergy but chart has listed cefixime from 2021  s/p 1L IVF-> pending repeat BMP for further fluids  stress dose steroids given chronic steroid use

## 2024-10-04 NOTE — ED ADULT NURSE NOTE - CHIEF COMPLAINT QUOTE
Pt. c/o sob, chills, nausea, vomiting and fevers since yesterday. phx: HTN, CKD. Pt. hypotensive in triage.

## 2024-10-05 LAB
24R-OH-CALCIDIOL SERPL-MCNC: 25.4 NG/ML — LOW (ref 30–80)
A1C WITH ESTIMATED AVERAGE GLUCOSE RESULT: 5.3 % — SIGNIFICANT CHANGE UP (ref 4–5.6)
ANION GAP SERPL CALC-SCNC: 14 MMOL/L — SIGNIFICANT CHANGE UP (ref 7–14)
ANISOCYTOSIS BLD QL: SLIGHT — SIGNIFICANT CHANGE UP
BASOPHILS # BLD AUTO: 0 K/UL — SIGNIFICANT CHANGE UP (ref 0–0.2)
BASOPHILS NFR BLD AUTO: 0 % — SIGNIFICANT CHANGE UP (ref 0–2)
BUN SERPL-MCNC: 70 MG/DL — HIGH (ref 7–23)
BURR CELLS BLD QL SMEAR: PRESENT — SIGNIFICANT CHANGE UP
CALCIUM SERPL-MCNC: 7.3 MG/DL — LOW (ref 8.4–10.5)
CHLORIDE SERPL-SCNC: 104 MMOL/L — SIGNIFICANT CHANGE UP (ref 98–107)
CHOLEST SERPL-MCNC: 118 MG/DL — SIGNIFICANT CHANGE UP
CO2 SERPL-SCNC: 16 MMOL/L — LOW (ref 22–31)
CREAT ?TM UR-MCNC: 44 MG/DL — SIGNIFICANT CHANGE UP
CREAT ?TM UR-MCNC: 69 MG/DL — SIGNIFICANT CHANGE UP
CREAT SERPL-MCNC: 3.54 MG/DL — HIGH (ref 0.5–1.3)
E COLI DNA BLD POS QL NAA+NON-PROBE: SIGNIFICANT CHANGE UP
EGFR: 14 ML/MIN/1.73M2 — LOW
EOSINOPHIL # BLD AUTO: 0 K/UL — SIGNIFICANT CHANGE UP (ref 0–0.5)
EOSINOPHIL NFR BLD AUTO: 0 % — SIGNIFICANT CHANGE UP (ref 0–6)
ESTIMATED AVERAGE GLUCOSE: 105 — SIGNIFICANT CHANGE UP
GIANT PLATELETS BLD QL SMEAR: PRESENT — SIGNIFICANT CHANGE UP
GLUCOSE SERPL-MCNC: 152 MG/DL — HIGH (ref 70–99)
GRAM STN FLD: ABNORMAL
HCT VFR BLD CALC: 28.4 % — LOW (ref 34.5–45)
HDLC SERPL-MCNC: 21 MG/DL — LOW
HGB BLD-MCNC: 9.7 G/DL — LOW (ref 11.5–15.5)
IANC: 19.63 K/UL — HIGH (ref 1.8–7.4)
LIPID PNL WITH DIRECT LDL SERPL: 53 MG/DL — SIGNIFICANT CHANGE UP
LYMPHOCYTES # BLD AUTO: 0.37 K/UL — LOW (ref 1–3.3)
LYMPHOCYTES # BLD AUTO: 1.7 % — LOW (ref 13–44)
MACROCYTES BLD QL: SLIGHT — SIGNIFICANT CHANGE UP
MANUAL SMEAR VERIFICATION: SIGNIFICANT CHANGE UP
MCHC RBC-ENTMCNC: 30.4 PG — SIGNIFICANT CHANGE UP (ref 27–34)
MCHC RBC-ENTMCNC: 34.2 GM/DL — SIGNIFICANT CHANGE UP (ref 32–36)
MCV RBC AUTO: 89 FL — SIGNIFICANT CHANGE UP (ref 80–100)
METAMYELOCYTES # FLD: 0.9 % — SIGNIFICANT CHANGE UP (ref 0–1)
METHOD TYPE: SIGNIFICANT CHANGE UP
MONOCYTES # BLD AUTO: 0 K/UL — SIGNIFICANT CHANGE UP (ref 0–0.9)
MONOCYTES NFR BLD AUTO: 0 % — LOW (ref 2–14)
MRSA PCR RESULT.: SIGNIFICANT CHANGE UP
NEUTROPHILS # BLD AUTO: 21.45 K/UL — HIGH (ref 1.8–7.4)
NEUTROPHILS NFR BLD AUTO: 92.2 % — HIGH (ref 43–77)
NEUTS BAND # BLD: 5.2 % — SIGNIFICANT CHANGE UP (ref 0–6)
NON HDL CHOLESTEROL: 97 MG/DL — SIGNIFICANT CHANGE UP
PLAT MORPH BLD: ABNORMAL
PLATELET # BLD AUTO: 141 K/UL — LOW (ref 150–400)
PLATELET COUNT - ESTIMATE: ABNORMAL
POTASSIUM SERPL-MCNC: 4 MMOL/L — SIGNIFICANT CHANGE UP (ref 3.5–5.3)
POTASSIUM SERPL-SCNC: 4 MMOL/L — SIGNIFICANT CHANGE UP (ref 3.5–5.3)
PROT ?TM UR-MCNC: 46 MG/DL — SIGNIFICANT CHANGE UP
PROT/CREAT UR-RTO: 1.1 RATIO — HIGH (ref 0–0.2)
RBC # BLD: 3.19 M/UL — LOW (ref 3.8–5.2)
RBC # FLD: 15.1 % — HIGH (ref 10.3–14.5)
RBC BLD AUTO: ABNORMAL
S AUREUS DNA NOSE QL NAA+PROBE: DETECTED
SODIUM SERPL-SCNC: 134 MMOL/L — LOW (ref 135–145)
SODIUM UR-SCNC: < 20 MMOL/L — SIGNIFICANT CHANGE UP
SPECIMEN SOURCE: SIGNIFICANT CHANGE UP
SPECIMEN SOURCE: SIGNIFICANT CHANGE UP
TRIGL SERPL-MCNC: 222 MG/DL — HIGH
WBC # BLD: 22.02 K/UL — HIGH (ref 3.8–10.5)
WBC # FLD AUTO: 22.02 K/UL — HIGH (ref 3.8–10.5)

## 2024-10-05 PROCEDURE — 99233 SBSQ HOSP IP/OBS HIGH 50: CPT

## 2024-10-05 PROCEDURE — 76770 US EXAM ABDO BACK WALL COMP: CPT | Mod: 26

## 2024-10-05 RX ORDER — MEROPENEM 500 MG/20ML
500 INJECTION INTRAVENOUS EVERY 12 HOURS
Refills: 0 | Status: DISCONTINUED | OUTPATIENT
Start: 2024-10-05 | End: 2024-10-12

## 2024-10-05 RX ORDER — PANTOPRAZOLE SODIUM 40 MG/1
40 TABLET, DELAYED RELEASE ORAL
Refills: 0 | Status: DISCONTINUED | OUTPATIENT
Start: 2024-10-05 | End: 2024-10-16

## 2024-10-05 RX ORDER — CHLORHEXIDINE GLUCONATE ORAL RINSE 1.2 MG/ML
1 SOLUTION DENTAL DAILY
Refills: 0 | Status: DISCONTINUED | OUTPATIENT
Start: 2024-10-05 | End: 2024-10-17

## 2024-10-05 RX ADMIN — Medication 50 MICROGRAM(S): at 06:01

## 2024-10-05 RX ADMIN — HYDROCORTISONE 50 MILLIGRAM(S): 5 TABLET ORAL at 16:10

## 2024-10-05 RX ADMIN — Medication 400 MILLIGRAM(S): at 11:26

## 2024-10-05 RX ADMIN — Medication 17 GRAM(S): at 11:26

## 2024-10-05 RX ADMIN — HYDROCORTISONE 50 MILLIGRAM(S): 5 TABLET ORAL at 05:50

## 2024-10-05 RX ADMIN — MEROPENEM 100 MILLIGRAM(S): 500 INJECTION INTRAVENOUS at 19:50

## 2024-10-05 RX ADMIN — Medication 5000 UNIT(S): at 21:40

## 2024-10-05 RX ADMIN — Medication 1: at 18:40

## 2024-10-05 RX ADMIN — HYDROCORTISONE 50 MILLIGRAM(S): 5 TABLET ORAL at 11:02

## 2024-10-05 RX ADMIN — HYDROCORTISONE 50 MILLIGRAM(S): 5 TABLET ORAL at 21:40

## 2024-10-05 RX ADMIN — MEROPENEM 100 MILLIGRAM(S): 500 INJECTION INTRAVENOUS at 07:51

## 2024-10-05 RX ADMIN — Medication 1: at 09:24

## 2024-10-05 RX ADMIN — Medication 80 MILLIGRAM(S): at 21:39

## 2024-10-05 RX ADMIN — Medication 1: at 13:22

## 2024-10-05 RX ADMIN — CHLORHEXIDINE GLUCONATE ORAL RINSE 1 APPLICATION(S): 1.2 SOLUTION DENTAL at 16:09

## 2024-10-05 NOTE — PHYSICAL THERAPY INITIAL EVALUATION ADULT - PERTINENT HX OF CURRENT PROBLEM, REHAB EVAL
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 Also endorsing intermittent SOB

## 2024-10-05 NOTE — PATIENT PROFILE ADULT - FALL HARM RISK - HARM RISK INTERVENTIONS

## 2024-10-05 NOTE — ED ADULT NURSE REASSESSMENT NOTE - NS ED NURSE REASSESS COMMENT FT1
ACP aware of temperature and blood pressure. awaiting further orders
ALYSSIA steen discontinued as result of temperature. paged provider awaiting call back
PT is rigoring and states she is cold. MD made aware. Liliane steen placed on PT as per order.
report received from odalis Smith. A&Ox4. NAD. daughter at bedside, translating. PT refuses translation services. pt denies SOB, chest pain, dizziness, weakness, urinary symptoms, HA, n/v/d, fevers, chills, pain. respirations are even and un labored. safety precautions maintained. NSR on cardiac monitor. call bell at bedside.
Break RN: Pt received in stretcher outside room 15. Pt in no apparent distress. Offered no acute complaints. NSR on monitor. BP as noted, ACP provider Jessica made aware, no new order. Bicarb infusing at 75ml/hr as per ordered. Report given to floor, pending transport.

## 2024-10-05 NOTE — PROGRESS NOTE ADULT - ASSESSMENT
69-year-old female comes into the ER with   Symptoms since yesterday of fevers up to 102, cough general malaise weakness rigors.  nephrology consulted for acute on ckd and electrolyte anonymities    Acute on ckd stage 3  baseline ~ 1.5  MICHAEL possible prerenal vs ATN  r/o obstruction check renal us  check urine na and osmo - pending collection  renal us unremarkable  ARB on hold  s/p IVF in ed  on sodium bicarb gtt x1 day  monitor bmp and uo  avoid nephrotoxic agents    hyponatremia  acute asymptomatic  possible dehydration   check urine na and osmo  s/p NS  slightly better  check repeat bmp  avoid overcorrection, na should not correct >8meq in 24 hrs    fever  work up per team    acidosis  non AG  on 1/2ns +75bicarb @75cc/hr x1day  monitor    hypocalcemia  pth 187  vit d 25.4  monitor    proteinuria  check urine p/c ratio  known Arthritis and Sjogren syndrome follows rheum  being followed in office     hypotensive  bp soft  hold all bp meds  s/p ivf  sepsis work up

## 2024-10-05 NOTE — PHYSICAL THERAPY INITIAL EVALUATION ADULT - ADDITIONAL COMMENTS
Pt lives in a home with family, ambulates daily, fully independent per daughter.   Patients Current SpO2: 99%

## 2024-10-05 NOTE — PROGRESS NOTE ADULT - PROBLEM SELECTOR PLAN 1
Met sepsis criteria on admission w/ fever and leukocytosis   Source likely urine  --c/w IV Meropenem  --Blood cx w/ Ecoli  --Obtain repeat blood cx  --F/u urine cx  --Trend leukocytosis   --Started on stress dose steroids IV Solucortef 50mg Q6H given hx of chronic steroid use. Also w/ hypotension on admission  --Wean down as tolerated

## 2024-10-05 NOTE — PROGRESS NOTE ADULT - ASSESSMENT
69 yr old female presenting with hx of HTN, CKD a/w sepsis w/ EColi bacteremia likely i/s/o UTI, Also w/ MICHAEL on CKD

## 2024-10-06 LAB
-  AMPICILLIN/SULBACTAM: SIGNIFICANT CHANGE UP
-  AMPICILLIN: SIGNIFICANT CHANGE UP
-  AZTREONAM: SIGNIFICANT CHANGE UP
-  CEFAZOLIN: SIGNIFICANT CHANGE UP
-  CEFEPIME: SIGNIFICANT CHANGE UP
-  CEFOXITIN: SIGNIFICANT CHANGE UP
-  CEFTRIAXONE: SIGNIFICANT CHANGE UP
-  CIPROFLOXACIN: SIGNIFICANT CHANGE UP
-  ERTAPENEM: SIGNIFICANT CHANGE UP
-  GENTAMICIN: SIGNIFICANT CHANGE UP
-  IMIPENEM: SIGNIFICANT CHANGE UP
-  LEVOFLOXACIN: SIGNIFICANT CHANGE UP
-  MEROPENEM: SIGNIFICANT CHANGE UP
-  PIPERACILLIN/TAZOBACTAM: SIGNIFICANT CHANGE UP
-  TOBRAMYCIN: SIGNIFICANT CHANGE UP
-  TRIMETHOPRIM/SULFAMETHOXAZOLE: SIGNIFICANT CHANGE UP
24R-OH-CALCIDIOL SERPL-MCNC: 25.4 NG/ML — LOW (ref 30–80)
ANION GAP SERPL CALC-SCNC: 14 MMOL/L — SIGNIFICANT CHANGE UP (ref 7–14)
BASOPHILS # BLD AUTO: 0.02 K/UL — SIGNIFICANT CHANGE UP (ref 0–0.2)
BASOPHILS NFR BLD AUTO: 0.1 % — SIGNIFICANT CHANGE UP (ref 0–2)
BUN SERPL-MCNC: 80 MG/DL — HIGH (ref 7–23)
CALCIUM SERPL-MCNC: 7.4 MG/DL — LOW (ref 8.4–10.5)
CHLORIDE SERPL-SCNC: 102 MMOL/L — SIGNIFICANT CHANGE UP (ref 98–107)
CO2 SERPL-SCNC: 17 MMOL/L — LOW (ref 22–31)
CREAT ?TM UR-MCNC: 44 MG/DL — SIGNIFICANT CHANGE UP
CREAT SERPL-MCNC: 3.05 MG/DL — HIGH (ref 0.5–1.3)
EGFR: 16 ML/MIN/1.73M2 — LOW
EOSINOPHIL # BLD AUTO: 0 K/UL — SIGNIFICANT CHANGE UP (ref 0–0.5)
EOSINOPHIL NFR BLD AUTO: 0 % — SIGNIFICANT CHANGE UP (ref 0–6)
GLUCOSE SERPL-MCNC: 165 MG/DL — HIGH (ref 70–99)
HCT VFR BLD CALC: 25.8 % — LOW (ref 34.5–45)
HGB BLD-MCNC: 9.3 G/DL — LOW (ref 11.5–15.5)
IANC: 14.08 K/UL — HIGH (ref 1.8–7.4)
IMM GRANULOCYTES NFR BLD AUTO: 0.8 % — SIGNIFICANT CHANGE UP (ref 0–0.9)
LYMPHOCYTES # BLD AUTO: 1.44 K/UL — SIGNIFICANT CHANGE UP (ref 1–3.3)
LYMPHOCYTES # BLD AUTO: 8.8 % — LOW (ref 13–44)
MAGNESIUM SERPL-MCNC: 2.5 MG/DL — SIGNIFICANT CHANGE UP (ref 1.6–2.6)
MCHC RBC-ENTMCNC: 30.6 PG — SIGNIFICANT CHANGE UP (ref 27–34)
MCHC RBC-ENTMCNC: 36 GM/DL — SIGNIFICANT CHANGE UP (ref 32–36)
MCV RBC AUTO: 84.9 FL — SIGNIFICANT CHANGE UP (ref 80–100)
METHOD TYPE: SIGNIFICANT CHANGE UP
MONOCYTES # BLD AUTO: 0.74 K/UL — SIGNIFICANT CHANGE UP (ref 0–0.9)
MONOCYTES NFR BLD AUTO: 4.5 % — SIGNIFICANT CHANGE UP (ref 2–14)
NEUTROPHILS # BLD AUTO: 14.08 K/UL — HIGH (ref 1.8–7.4)
NEUTROPHILS NFR BLD AUTO: 85.8 % — HIGH (ref 43–77)
NRBC # BLD: 0 /100 WBCS — SIGNIFICANT CHANGE UP (ref 0–0)
NRBC # FLD: 0 K/UL — SIGNIFICANT CHANGE UP (ref 0–0)
OSMOLALITY UR: 334 MOSM/KG — SIGNIFICANT CHANGE UP (ref 50–1200)
PHOSPHATE SERPL-MCNC: 3.9 MG/DL — SIGNIFICANT CHANGE UP (ref 2.5–4.5)
PLATELET # BLD AUTO: 156 K/UL — SIGNIFICANT CHANGE UP (ref 150–400)
POTASSIUM SERPL-MCNC: 4.2 MMOL/L — SIGNIFICANT CHANGE UP (ref 3.5–5.3)
POTASSIUM SERPL-SCNC: 4.2 MMOL/L — SIGNIFICANT CHANGE UP (ref 3.5–5.3)
PTH-INTACT FLD-MCNC: 133 PG/ML — HIGH (ref 15–65)
RBC # BLD: 3.04 M/UL — LOW (ref 3.8–5.2)
RBC # FLD: 15.1 % — HIGH (ref 10.3–14.5)
SODIUM SERPL-SCNC: 133 MMOL/L — LOW (ref 135–145)
SODIUM UR-SCNC: <20 MMOL/L — SIGNIFICANT CHANGE UP
WBC # BLD: 16.41 K/UL — HIGH (ref 3.8–10.5)
WBC # FLD AUTO: 16.41 K/UL — HIGH (ref 3.8–10.5)

## 2024-10-06 PROCEDURE — 99232 SBSQ HOSP IP/OBS MODERATE 35: CPT

## 2024-10-06 RX ORDER — SODIUM BICARBONATE 650 MG
0.11 TABLET ORAL
Qty: 75 | Refills: 0 | Status: DISCONTINUED | OUTPATIENT
Start: 2024-10-06 | End: 2024-10-09

## 2024-10-06 RX ORDER — MUPIROCIN 20 MG/G
1 OINTMENT TOPICAL
Refills: 0 | Status: COMPLETED | OUTPATIENT
Start: 2024-10-06 | End: 2024-10-11

## 2024-10-06 RX ORDER — CRANBERRY FRUIT EXTRACT 650 MG
2000 CAPSULE ORAL DAILY
Refills: 0 | Status: DISCONTINUED | OUTPATIENT
Start: 2024-10-06 | End: 2024-10-17

## 2024-10-06 RX ADMIN — Medication 2000 UNIT(S): at 16:05

## 2024-10-06 RX ADMIN — Medication 75 MEQ/KG/HR: at 14:21

## 2024-10-06 RX ADMIN — Medication 5000 UNIT(S): at 19:20

## 2024-10-06 RX ADMIN — Medication 400 MILLIGRAM(S): at 12:45

## 2024-10-06 RX ADMIN — HYDROCORTISONE 50 MILLIGRAM(S): 5 TABLET ORAL at 05:21

## 2024-10-06 RX ADMIN — Medication 50 MICROGRAM(S): at 05:22

## 2024-10-06 RX ADMIN — PANTOPRAZOLE SODIUM 40 MILLIGRAM(S): 40 TABLET, DELAYED RELEASE ORAL at 05:22

## 2024-10-06 RX ADMIN — Medication 2: at 12:45

## 2024-10-06 RX ADMIN — HYDROCORTISONE 50 MILLIGRAM(S): 5 TABLET ORAL at 16:05

## 2024-10-06 RX ADMIN — Medication 17 GRAM(S): at 12:45

## 2024-10-06 RX ADMIN — CHLORHEXIDINE GLUCONATE ORAL RINSE 1 APPLICATION(S): 1.2 SOLUTION DENTAL at 12:53

## 2024-10-06 RX ADMIN — MEROPENEM 100 MILLIGRAM(S): 500 INJECTION INTRAVENOUS at 05:22

## 2024-10-06 RX ADMIN — Medication 5000 UNIT(S): at 05:22

## 2024-10-06 RX ADMIN — HYDROCORTISONE 50 MILLIGRAM(S): 5 TABLET ORAL at 21:09

## 2024-10-06 RX ADMIN — HYDROCORTISONE 50 MILLIGRAM(S): 5 TABLET ORAL at 10:54

## 2024-10-06 RX ADMIN — MEROPENEM 100 MILLIGRAM(S): 500 INJECTION INTRAVENOUS at 19:20

## 2024-10-06 RX ADMIN — Medication 5000 UNIT(S): at 12:45

## 2024-10-06 NOTE — PROGRESS NOTE ADULT - ASSESSMENT
69-year-old female comes into the ER with Symptoms since yesterday of fevers up to 102, cough general malaise weakness rigors.  nephrology consulted for acute on ckd and electrolyte anonymities    Acute on ckd stage 3  baseline ~ 1.5  MICHAEL possible prerenal vs ATN  Renal US unremarkable  Urine Na <20.  Repeat urine Na and urine creatinine; Urine Na<20.  + proteinuria and hematuria likely in setting of UTI but UPr/Cr 1.1gm.  ARB on hold  s/p IVF in ED  s/p sodium bicarb gtt x1 day  Renal function improving today.  monitor bmp and uo  avoid nephrotoxic agents    hyponatremia  acute asymptomatic  possible dehydration   Urine Na <20; pending urine osm.  s/p NS  Na marginal but stable.  avoid overcorrection, na should not correct >8meq in 24 hrs    fever  Possibly d/t UTI.  Workup and management per team.    acidosis  non AG  S/P 1/2ns +75bicarb @75cc/hr x1day  Restart bicarb gtt.  monitor    hypocalcemia  Repeat  - high for CKD stage.  Likely in setting of hypoalbuminemia vs Vit. D insufficiency - Vit. D 25OH 25.4.  Start Vit D3 2000units qd.  Optimize albumin.  monitor    proteinuria  check urine p/c ratio  known Arthritis and Sjogren syndrome follows rheum  being followed in office     hypotensive  bp soft  hold all bp meds  restart IVF as above.  sepsis work up    Proteinuria/Hematuria  Possibly in setting of UTI vs. DM.  UPr/Cr 1.1gm.  Repeat UA after completing antibiotics.  Workup outpatient.  ARB on hold.  Monitor.

## 2024-10-06 NOTE — PROGRESS NOTE ADULT - ASSESSMENT
69 yr old (Welsh speaking) female presenting with hx of HTN, CKD a/w sepsis w/ EColi bacteremia likely i/s/o UTI, Also w/ MICHAEL on CKD

## 2024-10-07 LAB
-  AMOXICILLIN/CLAVULANIC ACID: SIGNIFICANT CHANGE UP
-  AMPICILLIN/SULBACTAM: SIGNIFICANT CHANGE UP
-  AMPICILLIN: SIGNIFICANT CHANGE UP
-  AZTREONAM: SIGNIFICANT CHANGE UP
-  CEFAZOLIN: SIGNIFICANT CHANGE UP
-  CEFEPIME: SIGNIFICANT CHANGE UP
-  CEFOXITIN: SIGNIFICANT CHANGE UP
-  CEFTRIAXONE: SIGNIFICANT CHANGE UP
-  CEFUROXIME: SIGNIFICANT CHANGE UP
-  CIPROFLOXACIN: SIGNIFICANT CHANGE UP
-  ERTAPENEM: SIGNIFICANT CHANGE UP
-  GENTAMICIN: SIGNIFICANT CHANGE UP
-  IMIPENEM: SIGNIFICANT CHANGE UP
-  LEVOFLOXACIN: SIGNIFICANT CHANGE UP
-  MEROPENEM: SIGNIFICANT CHANGE UP
-  NITROFURANTOIN: SIGNIFICANT CHANGE UP
-  PIPERACILLIN/TAZOBACTAM: SIGNIFICANT CHANGE UP
-  TOBRAMYCIN: SIGNIFICANT CHANGE UP
-  TRIMETHOPRIM/SULFAMETHOXAZOLE: SIGNIFICANT CHANGE UP
ANION GAP SERPL CALC-SCNC: 16 MMOL/L — HIGH (ref 7–14)
BASOPHILS # BLD AUTO: 0.02 K/UL — SIGNIFICANT CHANGE UP (ref 0–0.2)
BASOPHILS NFR BLD AUTO: 0.1 % — SIGNIFICANT CHANGE UP (ref 0–2)
BUN SERPL-MCNC: 84 MG/DL — HIGH (ref 7–23)
CALCIUM SERPL-MCNC: 7.8 MG/DL — LOW (ref 8.4–10.5)
CHLORIDE SERPL-SCNC: 101 MMOL/L — SIGNIFICANT CHANGE UP (ref 98–107)
CO2 SERPL-SCNC: 15 MMOL/L — LOW (ref 22–31)
CREAT SERPL-MCNC: 2.86 MG/DL — HIGH (ref 0.5–1.3)
CULTURE RESULTS: ABNORMAL
EGFR: 18 ML/MIN/1.73M2 — LOW
EOSINOPHIL # BLD AUTO: 0 K/UL — SIGNIFICANT CHANGE UP (ref 0–0.5)
EOSINOPHIL NFR BLD AUTO: 0 % — SIGNIFICANT CHANGE UP (ref 0–6)
GLUCOSE SERPL-MCNC: 150 MG/DL — HIGH (ref 70–99)
HCT VFR BLD CALC: 27 % — LOW (ref 34.5–45)
HGB BLD-MCNC: 9.7 G/DL — LOW (ref 11.5–15.5)
IANC: 10.1 K/UL — HIGH (ref 1.8–7.4)
IMM GRANULOCYTES NFR BLD AUTO: 2.9 % — HIGH (ref 0–0.9)
LYMPHOCYTES # BLD AUTO: 2.96 K/UL — SIGNIFICANT CHANGE UP (ref 1–3.3)
LYMPHOCYTES # BLD AUTO: 20.7 % — SIGNIFICANT CHANGE UP (ref 13–44)
MAGNESIUM SERPL-MCNC: 2.5 MG/DL — SIGNIFICANT CHANGE UP (ref 1.6–2.6)
MCHC RBC-ENTMCNC: 30.2 PG — SIGNIFICANT CHANGE UP (ref 27–34)
MCHC RBC-ENTMCNC: 35.9 GM/DL — SIGNIFICANT CHANGE UP (ref 32–36)
MCV RBC AUTO: 84.1 FL — SIGNIFICANT CHANGE UP (ref 80–100)
METHOD TYPE: SIGNIFICANT CHANGE UP
MONOCYTES # BLD AUTO: 0.81 K/UL — SIGNIFICANT CHANGE UP (ref 0–0.9)
MONOCYTES NFR BLD AUTO: 5.7 % — SIGNIFICANT CHANGE UP (ref 2–14)
NEUTROPHILS # BLD AUTO: 10.1 K/UL — HIGH (ref 1.8–7.4)
NEUTROPHILS NFR BLD AUTO: 70.6 % — SIGNIFICANT CHANGE UP (ref 43–77)
NRBC # BLD: 0 /100 WBCS — SIGNIFICANT CHANGE UP (ref 0–0)
NRBC # FLD: 0 K/UL — SIGNIFICANT CHANGE UP (ref 0–0)
ORGANISM # SPEC MICROSCOPIC CNT: ABNORMAL
PHOSPHATE SERPL-MCNC: 4.9 MG/DL — HIGH (ref 2.5–4.5)
PLATELET # BLD AUTO: 178 K/UL — SIGNIFICANT CHANGE UP (ref 150–400)
POTASSIUM SERPL-MCNC: 4.3 MMOL/L — SIGNIFICANT CHANGE UP (ref 3.5–5.3)
POTASSIUM SERPL-SCNC: 4.3 MMOL/L — SIGNIFICANT CHANGE UP (ref 3.5–5.3)
RBC # BLD: 3.21 M/UL — LOW (ref 3.8–5.2)
RBC # FLD: 15 % — HIGH (ref 10.3–14.5)
SODIUM SERPL-SCNC: 132 MMOL/L — LOW (ref 135–145)
SPECIMEN SOURCE: SIGNIFICANT CHANGE UP
WBC # BLD: 14.3 K/UL — HIGH (ref 3.8–10.5)
WBC # FLD AUTO: 14.3 K/UL — HIGH (ref 3.8–10.5)

## 2024-10-07 PROCEDURE — 93971 EXTREMITY STUDY: CPT | Mod: 26,RT

## 2024-10-07 PROCEDURE — 99232 SBSQ HOSP IP/OBS MODERATE 35: CPT

## 2024-10-07 RX ORDER — HYDROCORTISONE 5 MG/1
25 TABLET ORAL EVERY 6 HOURS
Refills: 0 | Status: DISCONTINUED | OUTPATIENT
Start: 2024-10-07 | End: 2024-10-08

## 2024-10-07 RX ORDER — SODIUM CHLORIDE IRRIG SOLUTION 0.9 %
1000 SOLUTION, IRRIGATION IRRIGATION
Refills: 0 | Status: DISCONTINUED | OUTPATIENT
Start: 2024-10-07 | End: 2024-10-09

## 2024-10-07 RX ADMIN — Medication 5000 UNIT(S): at 05:00

## 2024-10-07 RX ADMIN — Medication 2: at 18:25

## 2024-10-07 RX ADMIN — CHLORHEXIDINE GLUCONATE ORAL RINSE 1 APPLICATION(S): 1.2 SOLUTION DENTAL at 12:58

## 2024-10-07 RX ADMIN — Medication 17 GRAM(S): at 12:46

## 2024-10-07 RX ADMIN — HYDROCORTISONE 25 MILLIGRAM(S): 5 TABLET ORAL at 18:25

## 2024-10-07 RX ADMIN — Medication 2000 UNIT(S): at 12:44

## 2024-10-07 RX ADMIN — Medication 5000 UNIT(S): at 19:58

## 2024-10-07 RX ADMIN — MUPIROCIN 1 APPLICATION(S): 20 OINTMENT TOPICAL at 05:01

## 2024-10-07 RX ADMIN — Medication 75 MEQ/KG/HR: at 09:47

## 2024-10-07 RX ADMIN — PANTOPRAZOLE SODIUM 40 MILLIGRAM(S): 40 TABLET, DELAYED RELEASE ORAL at 05:01

## 2024-10-07 RX ADMIN — Medication 50 MICROGRAM(S): at 05:01

## 2024-10-07 RX ADMIN — Medication 5000 UNIT(S): at 12:46

## 2024-10-07 RX ADMIN — Medication 75 MILLILITER(S): at 14:09

## 2024-10-07 RX ADMIN — MEROPENEM 100 MILLIGRAM(S): 500 INJECTION INTRAVENOUS at 18:25

## 2024-10-07 RX ADMIN — MUPIROCIN 1 APPLICATION(S): 20 OINTMENT TOPICAL at 18:26

## 2024-10-07 RX ADMIN — HYDROCORTISONE 50 MILLIGRAM(S): 5 TABLET ORAL at 05:00

## 2024-10-07 RX ADMIN — MEROPENEM 100 MILLIGRAM(S): 500 INJECTION INTRAVENOUS at 05:00

## 2024-10-07 RX ADMIN — Medication 1: at 12:42

## 2024-10-07 RX ADMIN — Medication 400 MILLIGRAM(S): at 12:47

## 2024-10-07 RX ADMIN — HYDROCORTISONE 50 MILLIGRAM(S): 5 TABLET ORAL at 11:06

## 2024-10-07 NOTE — PROGRESS NOTE ADULT - PROBLEM SELECTOR PLAN 1
Met sepsis criteria on admission w/ fever and leukocytosis   Source likely urine  --c/w IV Meropenem  --Blood cx w/ Ecoli  --Obtain repeat blood cx  --F/u urine cx  --Trend leukocytosis   --Started on stress dose steroids IV Solucortef 50mg Q6H given hx of chronic steroid use. Also w/ hypotension on admission. Wean down as tolerated

## 2024-10-07 NOTE — PROGRESS NOTE ADULT - ASSESSMENT
69 yr old (Turkish speaking) female presenting with hx of HTN, CKD a/w sepsis w/ EColi bacteremia likely i/s/o UTI, Also w/ MICHAEL on CKD

## 2024-10-07 NOTE — PROVIDER CONTACT NOTE (OTHER) - SITUATION
pt c/o pain at IV insertion site. upon RN assessment, RUE tender to touch, +1 edema. No redness as site, flushing well, positive blood return.

## 2024-10-07 NOTE — PROGRESS NOTE ADULT - ASSESSMENT
69-year-old female comes into the ER with Symptoms since yesterday of fevers up to 102, cough general malaise weakness rigors.  nephrology consulted for acute on ckd and electrolyte anonymities    Acute on ckd stage 3  baseline ~ 1.5  MICHAEL possible prerenal vs ATN  Renal US unremarkable  Urine Na <20.  Repeat urine Na and urine creatinine; Urine Na<20.  + proteinuria and hematuria likely in setting of UTI but UPr/Cr 1.1gm.  ARB on hold  Renal function improving today.  monitor bmp and uo  avoid nephrotoxic agents    hyponatremia  acute asymptomatic  possible dehydration   Urine Na <20; pending urine osm.  s/p NS  Na marginal but stable.  avoid overcorrection, na should not correct >8meq in 24 hrs    fever  Possibly d/t UTI.  Workup and management per team.    acidosis  non AG  ChANGE ivf TO D5 WITH 150MEQ OF BICARB AT 75cc for 1 liter  Restart bicarb gtt.  monitor    hypocalcemia  Repeat  - high for CKD stage.  Likely in setting of hypoalbuminemia vs Vit. D insufficiency - Vit. D 25OH 25.4.  Start Vit D3 2000units qd.  Optimize albumin.  monitor    proteinuria  check urine p/c ratio  known Arthritis and Sjogren syndrome follows rheum  being followed in office     hypotensive  Bp stable  Monitor     Proteinuria/Hematuria  Possibly in setting of UTI vs. DM.  UPr/Cr 1.1gm.  Repeat UA after completing antibiotics.  Workup outpatient.  ARB on hold.  Monitor.

## 2024-10-07 NOTE — PROVIDER CONTACT NOTE (OTHER) - ACTION/TREATMENT ORDERED:
ACP made aware. warm compress applied to site. IV removed, new IV placed in LUE. ACP made aware. warm compress applied to site. IV removed, new IV placed in LUE. Duplex ordered for RUE.

## 2024-10-08 LAB
ANION GAP SERPL CALC-SCNC: 14 MMOL/L — SIGNIFICANT CHANGE UP (ref 7–14)
BUN SERPL-MCNC: 88 MG/DL — HIGH (ref 7–23)
CALCIUM SERPL-MCNC: 7.7 MG/DL — LOW (ref 8.4–10.5)
CHLORIDE SERPL-SCNC: 102 MMOL/L — SIGNIFICANT CHANGE UP (ref 98–107)
CO2 SERPL-SCNC: 21 MMOL/L — LOW (ref 22–31)
CREAT SERPL-MCNC: 2.68 MG/DL — HIGH (ref 0.5–1.3)
EGFR: 19 ML/MIN/1.73M2 — LOW
GLUCOSE SERPL-MCNC: 121 MG/DL — HIGH (ref 70–99)
HCT VFR BLD CALC: 27.9 % — LOW (ref 34.5–45)
HGB BLD-MCNC: 9.8 G/DL — LOW (ref 11.5–15.5)
MAGNESIUM SERPL-MCNC: 2.3 MG/DL — SIGNIFICANT CHANGE UP (ref 1.6–2.6)
MCHC RBC-ENTMCNC: 29.4 PG — SIGNIFICANT CHANGE UP (ref 27–34)
MCHC RBC-ENTMCNC: 35.1 GM/DL — SIGNIFICANT CHANGE UP (ref 32–36)
MCV RBC AUTO: 83.8 FL — SIGNIFICANT CHANGE UP (ref 80–100)
NRBC # BLD: 0 /100 WBCS — SIGNIFICANT CHANGE UP (ref 0–0)
NRBC # FLD: 0.02 K/UL — HIGH (ref 0–0)
PHOSPHATE SERPL-MCNC: 4.8 MG/DL — HIGH (ref 2.5–4.5)
PLATELET # BLD AUTO: 213 K/UL — SIGNIFICANT CHANGE UP (ref 150–400)
POTASSIUM SERPL-MCNC: 3.8 MMOL/L — SIGNIFICANT CHANGE UP (ref 3.5–5.3)
POTASSIUM SERPL-SCNC: 3.8 MMOL/L — SIGNIFICANT CHANGE UP (ref 3.5–5.3)
RBC # BLD: 3.33 M/UL — LOW (ref 3.8–5.2)
RBC # FLD: 14.7 % — HIGH (ref 10.3–14.5)
SODIUM SERPL-SCNC: 137 MMOL/L — SIGNIFICANT CHANGE UP (ref 135–145)
WBC # BLD: 10.22 K/UL — SIGNIFICANT CHANGE UP (ref 3.8–10.5)
WBC # FLD AUTO: 10.22 K/UL — SIGNIFICANT CHANGE UP (ref 3.8–10.5)

## 2024-10-08 PROCEDURE — 99232 SBSQ HOSP IP/OBS MODERATE 35: CPT

## 2024-10-08 RX ORDER — HYDROCORTISONE 5 MG/1
10 TABLET ORAL EVERY 6 HOURS
Refills: 0 | Status: DISCONTINUED | OUTPATIENT
Start: 2024-10-08 | End: 2024-10-09

## 2024-10-08 RX ADMIN — Medication 2000 UNIT(S): at 12:17

## 2024-10-08 RX ADMIN — MUPIROCIN 1 APPLICATION(S): 20 OINTMENT TOPICAL at 05:05

## 2024-10-08 RX ADMIN — Medication 3 MILLIGRAM(S): at 22:39

## 2024-10-08 RX ADMIN — MUPIROCIN 1 APPLICATION(S): 20 OINTMENT TOPICAL at 17:13

## 2024-10-08 RX ADMIN — Medication 1: at 18:08

## 2024-10-08 RX ADMIN — HYDROCORTISONE 25 MILLIGRAM(S): 5 TABLET ORAL at 00:23

## 2024-10-08 RX ADMIN — Medication 5000 UNIT(S): at 04:53

## 2024-10-08 RX ADMIN — Medication 5000 UNIT(S): at 19:33

## 2024-10-08 RX ADMIN — HYDROCORTISONE 25 MILLIGRAM(S): 5 TABLET ORAL at 05:02

## 2024-10-08 RX ADMIN — HYDROCORTISONE 10 MILLIGRAM(S): 5 TABLET ORAL at 17:18

## 2024-10-08 RX ADMIN — MEROPENEM 100 MILLIGRAM(S): 500 INJECTION INTRAVENOUS at 17:13

## 2024-10-08 RX ADMIN — Medication 50 MICROGRAM(S): at 05:02

## 2024-10-08 RX ADMIN — Medication 400 MILLIGRAM(S): at 12:18

## 2024-10-08 RX ADMIN — HYDROCORTISONE 25 MILLIGRAM(S): 5 TABLET ORAL at 12:17

## 2024-10-08 RX ADMIN — Medication 30 MILLILITER(S): at 21:06

## 2024-10-08 RX ADMIN — MEROPENEM 100 MILLIGRAM(S): 500 INJECTION INTRAVENOUS at 05:01

## 2024-10-08 RX ADMIN — Medication 5000 UNIT(S): at 12:18

## 2024-10-08 RX ADMIN — CHLORHEXIDINE GLUCONATE ORAL RINSE 1 APPLICATION(S): 1.2 SOLUTION DENTAL at 17:22

## 2024-10-08 RX ADMIN — HYDROCORTISONE 10 MILLIGRAM(S): 5 TABLET ORAL at 23:07

## 2024-10-08 RX ADMIN — PANTOPRAZOLE SODIUM 40 MILLIGRAM(S): 40 TABLET, DELAYED RELEASE ORAL at 05:02

## 2024-10-08 NOTE — PROGRESS NOTE ADULT - ASSESSMENT
69 yr old (Albanian speaking) female presenting with hx of HTN, CKD a/w sepsis w/ EColi bacteremia likely i/s/o UTI, Also w/ MICHAEL on CKD

## 2024-10-08 NOTE — PROGRESS NOTE ADULT - ASSESSMENT
69-year-old female comes into the ER with Symptoms since yesterday of fevers up to 102, cough general malaise weakness rigors.  nephrology consulted for acute on ckd and electrolyte anonymities    Acute on ckd stage 3  baseline ~ 1.5  MICHAEL possible prerenal   fena<1%  Renal US unremarkable.  ARB on hold  Renal function improving today on ivf.  monitor bmp and uo  avoid nephrotoxic agents    hyponatremia  sec to dehydration  improved  avoid overcorrection, na should not correct >8meq in 24 hrs    fever  Possibly d/t UTI.  Workup and management per team.    acidosis  non AG  improved s/p bicarb gtt  monitor    hypocalcemia  Repeat  - high for CKD stage.  Likely in setting of hypoalbuminemia vs Vit. D insufficiency - Vit. D 25OH 25.4.  Start Vit D3 2000units qd.  Optimize albumin.  monitor    proteinuria  check urine p/c ratio  known Arthritis and Sjogren syndrome follows rheum  being followed in office     hypotensive  Bp stable  Monitor     Proteinuria/Hematuria  Possibly in setting of UTI vs. DM.  UPr/Cr 1.1gm.  Repeat UA after completing antibiotics.  Workup outpatient.  ARB on hold.  Monitor.

## 2024-10-09 LAB
ANION GAP SERPL CALC-SCNC: 13 MMOL/L — SIGNIFICANT CHANGE UP (ref 7–14)
BUN SERPL-MCNC: 85 MG/DL — HIGH (ref 7–23)
CALCIUM SERPL-MCNC: 7.7 MG/DL — LOW (ref 8.4–10.5)
CHLORIDE SERPL-SCNC: 103 MMOL/L — SIGNIFICANT CHANGE UP (ref 98–107)
CO2 SERPL-SCNC: 22 MMOL/L — SIGNIFICANT CHANGE UP (ref 22–31)
CREAT SERPL-MCNC: 2.35 MG/DL — HIGH (ref 0.5–1.3)
EGFR: 22 ML/MIN/1.73M2 — LOW
GLUCOSE SERPL-MCNC: 149 MG/DL — HIGH (ref 70–99)
HCT VFR BLD CALC: 25.5 % — LOW (ref 34.5–45)
HGB BLD-MCNC: 9 G/DL — LOW (ref 11.5–15.5)
MAGNESIUM SERPL-MCNC: 2.2 MG/DL — SIGNIFICANT CHANGE UP (ref 1.6–2.6)
MCHC RBC-ENTMCNC: 30.1 PG — SIGNIFICANT CHANGE UP (ref 27–34)
MCHC RBC-ENTMCNC: 35.3 GM/DL — SIGNIFICANT CHANGE UP (ref 32–36)
MCV RBC AUTO: 85.3 FL — SIGNIFICANT CHANGE UP (ref 80–100)
NRBC # BLD: 0 /100 WBCS — SIGNIFICANT CHANGE UP (ref 0–0)
NRBC # FLD: 0.02 K/UL — HIGH (ref 0–0)
PHOSPHATE SERPL-MCNC: 4.4 MG/DL — SIGNIFICANT CHANGE UP (ref 2.5–4.5)
PLATELET # BLD AUTO: 203 K/UL — SIGNIFICANT CHANGE UP (ref 150–400)
POTASSIUM SERPL-MCNC: 3.3 MMOL/L — LOW (ref 3.5–5.3)
POTASSIUM SERPL-SCNC: 3.3 MMOL/L — LOW (ref 3.5–5.3)
RBC # BLD: 2.99 M/UL — LOW (ref 3.8–5.2)
RBC # FLD: 14.7 % — HIGH (ref 10.3–14.5)
SODIUM SERPL-SCNC: 138 MMOL/L — SIGNIFICANT CHANGE UP (ref 135–145)
WBC # BLD: 10.58 K/UL — HIGH (ref 3.8–10.5)
WBC # FLD AUTO: 10.58 K/UL — HIGH (ref 3.8–10.5)

## 2024-10-09 PROCEDURE — 99232 SBSQ HOSP IP/OBS MODERATE 35: CPT

## 2024-10-09 RX ORDER — SODIUM CHLORIDE IRRIG SOLUTION 0.9 %
1000 SOLUTION, IRRIGATION IRRIGATION
Refills: 0 | Status: DISCONTINUED | OUTPATIENT
Start: 2024-10-09 | End: 2024-10-10

## 2024-10-09 RX ORDER — HYDROCORTISONE 5 MG/1
10 TABLET ORAL EVERY 12 HOURS
Refills: 0 | Status: COMPLETED | OUTPATIENT
Start: 2024-10-09 | End: 2024-10-10

## 2024-10-09 RX ADMIN — HYDROCORTISONE 10 MILLIGRAM(S): 5 TABLET ORAL at 13:06

## 2024-10-09 RX ADMIN — Medication 20 MILLIEQUIVALENT(S): at 09:46

## 2024-10-09 RX ADMIN — Medication 5000 UNIT(S): at 05:25

## 2024-10-09 RX ADMIN — Medication 5000 UNIT(S): at 13:03

## 2024-10-09 RX ADMIN — HYDROCORTISONE 10 MILLIGRAM(S): 5 TABLET ORAL at 18:31

## 2024-10-09 RX ADMIN — Medication 17 GRAM(S): at 13:09

## 2024-10-09 RX ADMIN — MUPIROCIN 1 APPLICATION(S): 20 OINTMENT TOPICAL at 05:32

## 2024-10-09 RX ADMIN — Medication 75 MILLILITER(S): at 16:35

## 2024-10-09 RX ADMIN — Medication 20 MILLIEQUIVALENT(S): at 13:06

## 2024-10-09 RX ADMIN — Medication 1: at 13:04

## 2024-10-09 RX ADMIN — MUPIROCIN 1 APPLICATION(S): 20 OINTMENT TOPICAL at 18:31

## 2024-10-09 RX ADMIN — Medication 5000 UNIT(S): at 20:01

## 2024-10-09 RX ADMIN — MEROPENEM 100 MILLIGRAM(S): 500 INJECTION INTRAVENOUS at 18:29

## 2024-10-09 RX ADMIN — MEROPENEM 100 MILLIGRAM(S): 500 INJECTION INTRAVENOUS at 05:31

## 2024-10-09 RX ADMIN — PANTOPRAZOLE SODIUM 40 MILLIGRAM(S): 40 TABLET, DELAYED RELEASE ORAL at 05:28

## 2024-10-09 RX ADMIN — Medication 50 MICROGRAM(S): at 05:29

## 2024-10-09 RX ADMIN — Medication 20 MILLIEQUIVALENT(S): at 11:05

## 2024-10-09 RX ADMIN — Medication 2000 UNIT(S): at 13:06

## 2024-10-09 RX ADMIN — Medication 400 MILLIGRAM(S): at 13:05

## 2024-10-09 RX ADMIN — HYDROCORTISONE 10 MILLIGRAM(S): 5 TABLET ORAL at 05:29

## 2024-10-09 RX ADMIN — CHLORHEXIDINE GLUCONATE ORAL RINSE 1 APPLICATION(S): 1.2 SOLUTION DENTAL at 13:17

## 2024-10-09 NOTE — PROGRESS NOTE ADULT - ASSESSMENT
69 yr old (Frisian speaking) female presenting with hx of HTN, CKD a/w sepsis w/ EColi bacteremia likely i/s/o UTI, Also w/ MICHAEL on CKD

## 2024-10-09 NOTE — PROGRESS NOTE ADULT - ASSESSMENT
69-year-old female comes into the ER with Symptoms since yesterday of fevers up to 102, cough general malaise weakness rigors.  nephrology consulted for acute on ckd and electrolyte anonymities    Acute on ckd stage 3  baseline ~ 1.5  MICHAEL possible prerenal   fena<1%  Renal US unremarkable.  ARB on hold  Renal function improving will continue ivf  monitor bmp and uo  avoid nephrotoxic agents    hyponatremia  sec to dehydration  improved  avoid overcorrection, na should not correct >8meq in 24 hrs    fever  Possibly d/t UTI.  Workup and management per team.    acidosis  non AG  improved s/p bicarb gtt  monitor    hypocalcemia  Repeat  - high for CKD stage.  Likely in setting of hypoalbuminemia vs Vit. D insufficiency - Vit. D 25OH 25.4.  Start Vit D3 2000units qd.  Optimize albumin.  monitor    proteinuria  check urine p/c ratio  known Arthritis and Sjogren syndrome follows rheum  being followed in office     hypotensive  Bp stable  Monitor     Proteinuria/Hematuria  Possibly in setting of UTI vs. DM.  UPr/Cr 1.1gm.  Repeat UA after completing antibiotics.  Workup outpatient.  ARB on hold.  Monitor. 69-year-old female comes into the ER with Symptoms since yesterday of fevers up to 102, cough general malaise weakness rigors.  nephrology consulted for acute on ckd and electrolyte anonymities    Acute on ckd stage 3  baseline ~ 1.5  MICHAEL possible prerenal   fena<1%  Renal US unremarkable.  ARB on hold  Renal function improving BUN still elevated will start LR @ 75cc/hr x1day  monitor bmp and uo  avoid nephrotoxic agents    hyponatremia  sec to dehydration  improved  avoid overcorrection, na should not correct >8meq in 24 hrs    fever  Possibly d/t UTI.  Workup and management per team.    acidosis  non AG  improved s/p bicarb gtt  monitor    hypocalcemia  Repeat  - high for CKD stage.  Likely in setting of hypoalbuminemia vs Vit. D insufficiency - Vit. D 25OH 25.4.  Start Vit D3 2000units qd.  Optimize albumin.  monitor    proteinuria  check urine p/c ratio  known Arthritis and Sjogren syndrome follows rheum  being followed in office     hypotensive  Bp stable  Monitor     Proteinuria/Hematuria  Possibly in setting of UTI vs. DM.  UPr/Cr 1.1gm.  Repeat UA after completing antibiotics.  Workup outpatient.  ARB on hold.  Monitor.

## 2024-10-09 NOTE — PROGRESS NOTE ADULT - PROBLEM SELECTOR PLAN 1
Met sepsis criteria on admission w/ fever and leukocytosis   Source likely urine  - complicated by E coli bacteremia. Repeat BCx neg  --c/w IV Meropenem. Transition to oral on discharge to complete 14 day course   --Started on stress dose steroids IV Solucortef 50mg Q6H given hx of chronic steroid use. Also w/ hypotension on admission. Wean down as tolerated

## 2024-10-10 LAB
ANION GAP SERPL CALC-SCNC: 10 MMOL/L — SIGNIFICANT CHANGE UP (ref 7–14)
BUN SERPL-MCNC: 68 MG/DL — HIGH (ref 7–23)
CALCIUM SERPL-MCNC: 8 MG/DL — LOW (ref 8.4–10.5)
CHLORIDE SERPL-SCNC: 106 MMOL/L — SIGNIFICANT CHANGE UP (ref 98–107)
CO2 SERPL-SCNC: 24 MMOL/L — SIGNIFICANT CHANGE UP (ref 22–31)
CREAT SERPL-MCNC: 2 MG/DL — HIGH (ref 0.5–1.3)
EGFR: 27 ML/MIN/1.73M2 — LOW
GLUCOSE SERPL-MCNC: 88 MG/DL — SIGNIFICANT CHANGE UP (ref 70–99)
HCT VFR BLD CALC: 26.9 % — LOW (ref 34.5–45)
HGB BLD-MCNC: 9.2 G/DL — LOW (ref 11.5–15.5)
MAGNESIUM SERPL-MCNC: 2.1 MG/DL — SIGNIFICANT CHANGE UP (ref 1.6–2.6)
MCHC RBC-ENTMCNC: 30 PG — SIGNIFICANT CHANGE UP (ref 27–34)
MCHC RBC-ENTMCNC: 34.2 GM/DL — SIGNIFICANT CHANGE UP (ref 32–36)
MCV RBC AUTO: 87.6 FL — SIGNIFICANT CHANGE UP (ref 80–100)
NRBC # BLD: 0 /100 WBCS — SIGNIFICANT CHANGE UP (ref 0–0)
NRBC # FLD: 0 K/UL — SIGNIFICANT CHANGE UP (ref 0–0)
PHOSPHATE SERPL-MCNC: 3.3 MG/DL — SIGNIFICANT CHANGE UP (ref 2.5–4.5)
PLATELET # BLD AUTO: 223 K/UL — SIGNIFICANT CHANGE UP (ref 150–400)
POTASSIUM SERPL-MCNC: 4.1 MMOL/L — SIGNIFICANT CHANGE UP (ref 3.5–5.3)
POTASSIUM SERPL-SCNC: 4.1 MMOL/L — SIGNIFICANT CHANGE UP (ref 3.5–5.3)
RBC # BLD: 3.07 M/UL — LOW (ref 3.8–5.2)
RBC # FLD: 15.2 % — HIGH (ref 10.3–14.5)
SODIUM SERPL-SCNC: 140 MMOL/L — SIGNIFICANT CHANGE UP (ref 135–145)
WBC # BLD: 11.42 K/UL — HIGH (ref 3.8–10.5)
WBC # FLD AUTO: 11.42 K/UL — HIGH (ref 3.8–10.5)

## 2024-10-10 PROCEDURE — 99232 SBSQ HOSP IP/OBS MODERATE 35: CPT

## 2024-10-10 RX ORDER — HYDROCORTISONE 5 MG/1
10 TABLET ORAL ONCE
Refills: 0 | Status: COMPLETED | OUTPATIENT
Start: 2024-10-10 | End: 2024-10-10

## 2024-10-10 RX ORDER — PREDNISONE 5 MG/1
2.5 TABLET ORAL DAILY
Refills: 0 | Status: DISCONTINUED | OUTPATIENT
Start: 2024-10-10 | End: 2024-10-10

## 2024-10-10 RX ORDER — SODIUM CHLORIDE IRRIG SOLUTION 0.9 %
1000 SOLUTION, IRRIGATION IRRIGATION
Refills: 0 | Status: DISCONTINUED | OUTPATIENT
Start: 2024-10-10 | End: 2024-10-17

## 2024-10-10 RX ADMIN — Medication 5000 UNIT(S): at 05:18

## 2024-10-10 RX ADMIN — Medication 5000 UNIT(S): at 19:16

## 2024-10-10 RX ADMIN — HYDROCORTISONE 10 MILLIGRAM(S): 5 TABLET ORAL at 05:19

## 2024-10-10 RX ADMIN — Medication 5000 UNIT(S): at 12:55

## 2024-10-10 RX ADMIN — MUPIROCIN 1 APPLICATION(S): 20 OINTMENT TOPICAL at 17:51

## 2024-10-10 RX ADMIN — Medication 17 GRAM(S): at 12:55

## 2024-10-10 RX ADMIN — Medication 75 MILLILITER(S): at 22:11

## 2024-10-10 RX ADMIN — Medication 75 MILLILITER(S): at 13:00

## 2024-10-10 RX ADMIN — MUPIROCIN 1 APPLICATION(S): 20 OINTMENT TOPICAL at 05:19

## 2024-10-10 RX ADMIN — Medication 50 MICROGRAM(S): at 05:18

## 2024-10-10 RX ADMIN — Medication 400 MILLIGRAM(S): at 12:55

## 2024-10-10 RX ADMIN — MEROPENEM 100 MILLIGRAM(S): 500 INJECTION INTRAVENOUS at 17:51

## 2024-10-10 RX ADMIN — Medication 2000 UNIT(S): at 12:55

## 2024-10-10 RX ADMIN — MEROPENEM 100 MILLIGRAM(S): 500 INJECTION INTRAVENOUS at 05:19

## 2024-10-10 RX ADMIN — PANTOPRAZOLE SODIUM 40 MILLIGRAM(S): 40 TABLET, DELAYED RELEASE ORAL at 05:18

## 2024-10-10 RX ADMIN — CHLORHEXIDINE GLUCONATE ORAL RINSE 1 APPLICATION(S): 1.2 SOLUTION DENTAL at 13:00

## 2024-10-10 NOTE — PROGRESS NOTE ADULT - ASSESSMENT
69 yr old (Divehi speaking) female presenting with hx of HTN, CKD a/w sepsis w/ EColi bacteremia likely i/s/o UTI, Also w/ MICHAEL on CKD

## 2024-10-10 NOTE — PROGRESS NOTE ADULT - ASSESSMENT
69-year-old female comes into the ER with Symptoms since yesterday of fevers up to 102, cough general malaise weakness rigors.  nephrology consulted for acute on ckd and electrolyte anonymities    Acute on ckd stage 3  baseline ~ 1.5  MICHAEL possible prerenal   fena<1%  Renal US unremarkable.  ARB on hold  Renal function improving elevated bun improving on LR. will continue LR for another day  monitor bmp and uo  avoid nephrotoxic agents    hyponatremia  sec to dehydration  improved  avoid overcorrection, na should not correct >8meq in 24 hrs    fever  Possibly d/t UTI.  Workup and management per team.    acidosis  non AG  improved s/p bicarb gtt  monitor    hypocalcemia  Repeat  - high for CKD stage.  Likely in setting of hypoalbuminemia vs Vit. D insufficiency - Vit. D 25OH 25.4.  Start Vit D3 2000units qd.  Optimize albumin.  monitor    proteinuria  check urine p/c ratio  known Arthritis and Sjogren syndrome follows rheum  being followed in office     hypotensive  Bp stable  Monitor     Proteinuria/Hematuria  Possibly in setting of UTI vs. DM.  UPr/Cr 1.1gm.  Repeat UA after completing antibiotics.  Workup outpatient.  ARB on hold.  Monitor. 69-year-old female comes into the ER with Symptoms since yesterday of fevers up to 102, cough general malaise weakness rigors.  nephrology consulted for acute on ckd and electrolyte anonymities    Acute on ckd stage 3  baseline ~ 1.5  MICHAEL possible prerenal   fena<1%  Renal US unremarkable.  ARB on hold  Renal function improving elevated bun improving on LR. will continue LR for another day  monitor bmp and uo  avoid nephrotoxic agents    hyponatremia  sec to dehydration  improved  avoid overcorrection, na should not correct >8meq in 24 hrs    fever  Possibly d/t UTI.  Workup and management per team.    acidosis  non AG  improved s/p bicarb gtt now on LR cw  monitor    hypocalcemia  Repeat  - high for CKD stage.  Likely in setting of hypoalbuminemia vs Vit. D insufficiency - Vit. D 25OH 25.4.  Start Vit D3 2000units qd.  Optimize albumin.  monitor    proteinuria  check urine p/c ratio  known Arthritis and Sjogren syndrome follows rheum  being followed in office     hypotensive  Bp stable  Monitor     Proteinuria/Hematuria  Possibly in setting of UTI vs. DM.  UPr/Cr 1.1gm.  Repeat UA after completing antibiotics.  Workup outpatient.  ARB on hold.  Monitor.

## 2024-10-11 LAB
ANION GAP SERPL CALC-SCNC: 12 MMOL/L — SIGNIFICANT CHANGE UP (ref 7–14)
BASOPHILS # BLD AUTO: 0 K/UL — SIGNIFICANT CHANGE UP (ref 0–0.2)
BASOPHILS NFR BLD AUTO: 0 % — SIGNIFICANT CHANGE UP (ref 0–2)
BUN SERPL-MCNC: 56 MG/DL — HIGH (ref 7–23)
CALCIUM SERPL-MCNC: 7.7 MG/DL — LOW (ref 8.4–10.5)
CHLORIDE SERPL-SCNC: 105 MMOL/L — SIGNIFICANT CHANGE UP (ref 98–107)
CO2 SERPL-SCNC: 23 MMOL/L — SIGNIFICANT CHANGE UP (ref 22–31)
CREAT SERPL-MCNC: 1.93 MG/DL — HIGH (ref 0.5–1.3)
CULTURE RESULTS: SIGNIFICANT CHANGE UP
DACRYOCYTES BLD QL SMEAR: SLIGHT — SIGNIFICANT CHANGE UP
E COLI DNA BLD POS QL NAA+NON-PROBE: SIGNIFICANT CHANGE UP
EGFR: 28 ML/MIN/1.73M2 — LOW
EOSINOPHIL # BLD AUTO: 1.11 K/UL — HIGH (ref 0–0.5)
EOSINOPHIL NFR BLD AUTO: 11 % — HIGH (ref 0–6)
GIANT PLATELETS BLD QL SMEAR: PRESENT — SIGNIFICANT CHANGE UP
GLUCOSE SERPL-MCNC: 72 MG/DL — SIGNIFICANT CHANGE UP (ref 70–99)
GRAM STN FLD: ABNORMAL
HCT VFR BLD CALC: 26.2 % — LOW (ref 34.5–45)
HGB BLD-MCNC: 8.9 G/DL — LOW (ref 11.5–15.5)
HYPOCHROMIA BLD QL: SLIGHT — SIGNIFICANT CHANGE UP
IANC: 5.01 K/UL — SIGNIFICANT CHANGE UP (ref 1.8–7.4)
LYMPHOCYTES # BLD AUTO: 1.79 K/UL — SIGNIFICANT CHANGE UP (ref 1–3.3)
LYMPHOCYTES # BLD AUTO: 17.8 % — SIGNIFICANT CHANGE UP (ref 13–44)
MAGNESIUM SERPL-MCNC: 2.1 MG/DL — SIGNIFICANT CHANGE UP (ref 1.6–2.6)
MCHC RBC-ENTMCNC: 30.1 PG — SIGNIFICANT CHANGE UP (ref 27–34)
MCHC RBC-ENTMCNC: 34 GM/DL — SIGNIFICANT CHANGE UP (ref 32–36)
MCV RBC AUTO: 88.5 FL — SIGNIFICANT CHANGE UP (ref 80–100)
METHOD TYPE: SIGNIFICANT CHANGE UP
MONOCYTES # BLD AUTO: 1.03 K/UL — HIGH (ref 0–0.9)
MONOCYTES NFR BLD AUTO: 10.2 % — SIGNIFICANT CHANGE UP (ref 2–14)
MYELOCYTES NFR BLD: 3.4 % — HIGH (ref 0–0)
NEUTROPHILS # BLD AUTO: 5.79 K/UL — SIGNIFICANT CHANGE UP (ref 1.8–7.4)
NEUTROPHILS NFR BLD AUTO: 56.8 % — SIGNIFICANT CHANGE UP (ref 43–77)
NEUTS BAND # BLD: 0.8 % — SIGNIFICANT CHANGE UP (ref 0–6)
PHOSPHATE SERPL-MCNC: 3.3 MG/DL — SIGNIFICANT CHANGE UP (ref 2.5–4.5)
PLAT MORPH BLD: NORMAL — SIGNIFICANT CHANGE UP
PLATELET # BLD AUTO: 229 K/UL — SIGNIFICANT CHANGE UP (ref 150–400)
PLATELET COUNT - ESTIMATE: NORMAL — SIGNIFICANT CHANGE UP
POIKILOCYTOSIS BLD QL AUTO: SLIGHT — SIGNIFICANT CHANGE UP
POLYCHROMASIA BLD QL SMEAR: SLIGHT — SIGNIFICANT CHANGE UP
POTASSIUM SERPL-MCNC: 4.1 MMOL/L — SIGNIFICANT CHANGE UP (ref 3.5–5.3)
POTASSIUM SERPL-SCNC: 4.1 MMOL/L — SIGNIFICANT CHANGE UP (ref 3.5–5.3)
RBC # BLD: 2.96 M/UL — LOW (ref 3.8–5.2)
RBC # FLD: 15.3 % — HIGH (ref 10.3–14.5)
RBC BLD AUTO: ABNORMAL
SCHISTOCYTES BLD QL AUTO: SLIGHT — SIGNIFICANT CHANGE UP
SMUDGE CELLS # BLD: PRESENT — SIGNIFICANT CHANGE UP
SODIUM SERPL-SCNC: 140 MMOL/L — SIGNIFICANT CHANGE UP (ref 135–145)
SPECIMEN SOURCE: SIGNIFICANT CHANGE UP
TARGETS BLD QL SMEAR: SLIGHT — SIGNIFICANT CHANGE UP
WBC # BLD: 10.05 K/UL — SIGNIFICANT CHANGE UP (ref 3.8–10.5)
WBC # FLD AUTO: 10.05 K/UL — SIGNIFICANT CHANGE UP (ref 3.8–10.5)

## 2024-10-11 PROCEDURE — 99232 SBSQ HOSP IP/OBS MODERATE 35: CPT

## 2024-10-11 RX ORDER — MULTI VITAMIN/MINERAL SUPPLEMENT WITH ASCORBIC ACID, NIACIN, PYRIDOXINE, PANTOTHENIC ACID, FOLIC ACID, RIBOFLAVIN, THIAMIN, BIOTIN, COBALAMIN AND ZINC. 60; 20; 12.5; 10; 10; 1.7; 1.5; 1; .3; .006 MG/1; MG/1; MG/1; MG/1; MG/1; MG/1; MG/1; MG/1; MG/1; MG/1
1 TABLET, COATED ORAL DAILY
Refills: 0 | Status: DISCONTINUED | OUTPATIENT
Start: 2024-10-11 | End: 2024-10-17

## 2024-10-11 RX ORDER — SODIUM CHLORIDE IRRIG SOLUTION 0.9 %
1000 SOLUTION, IRRIGATION IRRIGATION
Refills: 0 | Status: DISCONTINUED | OUTPATIENT
Start: 2024-10-11 | End: 2024-10-17

## 2024-10-11 RX ADMIN — Medication 2000 UNIT(S): at 11:29

## 2024-10-11 RX ADMIN — MUPIROCIN 1 APPLICATION(S): 20 OINTMENT TOPICAL at 06:00

## 2024-10-11 RX ADMIN — PANTOPRAZOLE SODIUM 40 MILLIGRAM(S): 40 TABLET, DELAYED RELEASE ORAL at 06:06

## 2024-10-11 RX ADMIN — MEROPENEM 100 MILLIGRAM(S): 500 INJECTION INTRAVENOUS at 06:01

## 2024-10-11 RX ADMIN — Medication 5000 UNIT(S): at 04:46

## 2024-10-11 RX ADMIN — CHLORHEXIDINE GLUCONATE ORAL RINSE 1 APPLICATION(S): 1.2 SOLUTION DENTAL at 11:30

## 2024-10-11 RX ADMIN — Medication 400 MILLIGRAM(S): at 11:29

## 2024-10-11 RX ADMIN — MEROPENEM 100 MILLIGRAM(S): 500 INJECTION INTRAVENOUS at 18:26

## 2024-10-11 RX ADMIN — Medication 5000 UNIT(S): at 19:56

## 2024-10-11 RX ADMIN — Medication 2.5 MILLIGRAM(S): at 06:29

## 2024-10-11 RX ADMIN — MUPIROCIN 1 APPLICATION(S): 20 OINTMENT TOPICAL at 19:56

## 2024-10-11 RX ADMIN — Medication 50 MICROGRAM(S): at 06:00

## 2024-10-11 RX ADMIN — Medication 5000 UNIT(S): at 11:30

## 2024-10-11 RX ADMIN — Medication 17 GRAM(S): at 11:30

## 2024-10-11 RX ADMIN — Medication 75 MILLILITER(S): at 19:58

## 2024-10-11 NOTE — PROGRESS NOTE ADULT - ASSESSMENT
69-year-old female comes into the ER with Symptoms since yesterday of fevers up to 102, cough general malaise weakness rigors.  nephrology consulted for acute on ckd and electrolyte anonymities    Acute on ckd stage 3  baseline ~ 1.5  MICHAEL possible prerenal   fena<1%  Renal US unremarkable.  ARB on hold  Renal function improving elevated bun improving on LR. pending bun/cr today  monitor bmp and uo  avoid nephrotoxic agents    hyponatremia  sec to dehydration  improved  avoid overcorrection, na should not correct >8meq in 24 hrs    fever  Possibly d/t UTI.  Workup and management per team.    acidosis  non AG  improved s/p bicarb gtt now on LR   monitor    hypocalcemia  Repeat  - high for CKD stage.  Likely in setting of hypoalbuminemia vs Vit. D insufficiency - Vit. D 25OH 25.4.  Start Vit D3 2000units qd.  Optimize albumin.  monitor    proteinuria  check urine p/c ratio  known Arthritis and Sjogren syndrome follows rheum  being followed in office     hypotensive  Bp stable  Monitor     Proteinuria/Hematuria  Possibly in setting of UTI vs. DM.  UPr/Cr 1.1gm.  Repeat UA after completing antibiotics.  Workup outpatient.  ARB on hold.  Monitor. 69-year-old female comes into the ER with Symptoms since yesterday of fevers up to 102, cough general malaise weakness rigors.  nephrology consulted for acute on ckd and electrolyte anonymities    Acute on ckd stage 3  baseline ~ 1.5  MICHAEL possible prerenal   fena<1%  Renal US unremarkable.  ARB on hold  Renal function improving elevated bun improving on LR.   monitor bmp and uo  avoid nephrotoxic agents    hyponatremia  sec to dehydration  improved  avoid overcorrection, na should not correct >8meq in 24 hrs    fever  Possibly d/t UTI.  Workup and management per team.    acidosis  non AG  improved s/p bicarb gtt now on LR   monitor    hypocalcemia  Repeat  - high for CKD stage.  Likely in setting of hypoalbuminemia vs Vit. D insufficiency - Vit. D 25OH 25.4.  Start Vit D3 2000units qd.  Optimize albumin.  monitor    proteinuria  check urine p/c ratio  known Arthritis and Sjogren syndrome follows rheum  being followed in office     hypotensive  Bp stable  Monitor     Proteinuria/Hematuria  Possibly in setting of UTI vs. DM.  UPr/Cr 1.1gm.  Repeat UA after completing antibiotics.  Workup outpatient.  ARB on hold.  Monitor.

## 2024-10-11 NOTE — DIETITIAN INITIAL EVALUATION ADULT - EDUCATION DIETARY MODIFICATIONS
Low sodium diet modifications, consistent carbohydrate diet (food sources, portion control, hypoglycemia management) small frequent meals to prevent early satiety/(2) meets goals/outcomes/verbalization

## 2024-10-11 NOTE — DIETITIAN INITIAL EVALUATION ADULT - PROBLEM SELECTOR PLAN 5
Chronic unstable as hypotensive on presentation   Hold home losartan 25mg daily, pazosin 1mg nightly   Monitor and restart medications as appropriate

## 2024-10-11 NOTE — DIETITIAN INITIAL EVALUATION ADULT - PERTINENT LABORATORY DATA
10-11    140  |  105  |  56[H]  ----------------------------<  72  4.1   |  23  |  1.93[H]    Ca    7.7[L]      11 Oct 2024 05:33  Phos  3.3     10-11  Mg     2.10     10-11    POCT Blood Glucose.: 86 mg/dL (10-11-24 @ 12:51)  A1C with Estimated Average Glucose Result: 5.3 % (10-05-24 @ 06:45)

## 2024-10-11 NOTE — DIETITIAN INITIAL EVALUATION ADULT - OTHER INFO
Pt reported usual intake of hospital meals range around 75% or less, stated concerns stomach fills up too quickly. Lunch tray observed with fair intake (consumed everything on plate with the exception of rice) Per flowsheet review intake initially noted 51-75% and recently noted % per RN flowsheet review. Pt does not have any additional food preferences  denied concerns for N/V/D or abdominal pain or chewing swallowing concerns. Last BM reported today.

## 2024-10-11 NOTE — DIETITIAN INITIAL EVALUATION ADULT - REASON FOR ADMISSION
Last office visit 8/17/18 with Dr. Savana Plunkett. Appointment 11/24/18 with REBEKA Quijano. 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 Also endorsing intermittent SOB per chart

## 2024-10-11 NOTE — PROGRESS NOTE ADULT - ASSESSMENT
69 yr old (Estonian speaking) female presenting with hx of HTN, CKD a/w sepsis w/ EColi bacteremia likely i/s/o UTI, Also w/ MICHAEL on CKD

## 2024-10-11 NOTE — PROVIDER CONTACT NOTE (CRITICAL VALUE NOTIFICATION) - TEST AND RESULT REPORTED:
blood cultures from 10/6 , growth in anaerobic bottle, gram negative rods
Blood CX from 10/4 positive. Gram negative rods in anaerobic and aerobic bottle.

## 2024-10-11 NOTE — DIETITIAN NUTRITION RISK NOTIFICATION - ADDITIONAL COMMENTS/DIETITIAN RECOMMENDATIONS
Please see Dietitian Initial Assessment for complete recommendations.  Katherine Bal MS, RD, CDN, CNSC (pg #[phone])

## 2024-10-11 NOTE — PROVIDER CONTACT NOTE (CRITICAL VALUE NOTIFICATION) - SITUATION
blood cultures from 10/6 , growth in anaerobic bottle, gram negative rods
Blood CX from 10/4 positive. Gram negative rods in

## 2024-10-11 NOTE — DIETITIAN INITIAL EVALUATION ADULT - PERTINENT MEDS FT
MEDICATIONS  (STANDING):  chlorhexidine 2% Cloths 1 Application(s) Topical daily  cholecalciferol 2000 Unit(s) Oral daily  dextrose 5%. 1000 milliLiter(s) (100 mL/Hr) IV Continuous <Continuous>  dextrose 5%. 1000 milliLiter(s) (50 mL/Hr) IV Continuous <Continuous>  dextrose 50% Injectable 25 Gram(s) IV Push once  dextrose 50% Injectable 25 Gram(s) IV Push once  dextrose 50% Injectable 12.5 Gram(s) IV Push once  glucagon  Injectable 1 milliGRAM(s) IntraMuscular once  heparin   Injectable 5000 Unit(s) SubCutaneous every 8 hours  insulin lispro (ADMELOG) corrective regimen sliding scale   SubCutaneous three times a day before meals  insulin lispro (ADMELOG) corrective regimen sliding scale   SubCutaneous at bedtime  lactated ringers. 1000 milliLiter(s) (75 mL/Hr) IV Continuous <Continuous>  levothyroxine 50 MICROGram(s) Oral daily  magnesium oxide 400 milliGRAM(s) Oral daily  meropenem  IVPB 500 milliGRAM(s) IV Intermittent every 12 hours  mupirocin 2% Ointment 1 Application(s) Topical two times a day  pantoprazole    Tablet 40 milliGRAM(s) Oral before breakfast  polyethylene glycol 3350 17 Gram(s) Oral daily  prednisoLONE Syrup 3 mG/mL (PRELONE) 2.5 milliGRAM(s) Oral every 24 hours    MEDICATIONS  (PRN):  acetaminophen     Tablet .. 650 milliGRAM(s) Oral every 6 hours PRN Temp greater or equal to 38C (100.4F), Mild Pain (1 - 3)  aluminum hydroxide/magnesium hydroxide/simethicone Suspension 30 milliLiter(s) Oral every 4 hours PRN Dyspepsia  dextrose Oral Gel 15 Gram(s) Oral once PRN Blood Glucose LESS THAN 70 milliGRAM(s)/deciliter  melatonin 3 milliGRAM(s) Oral at bedtime PRN Insomnia  ondansetron Injectable 4 milliGRAM(s) IV Push every 8 hours PRN Nausea and/or Vomiting

## 2024-10-11 NOTE — DIETITIAN INITIAL EVALUATION ADULT - ORAL INTAKE PTA/DIET HISTORY
Communicated with patient in preferred language Telugu and additionally confirmed hx with pt daughter in-law (reached by contacting pt sons phone as listed on chart). Pt reported with 3 meals daily pt stated concerns that  she is eating small portion and reported decline in appetite as she has arthritis. Stated intake significantly declined for 2 days prior to admit when she could not eat at all. Pt reported concerns of emesis and SOB shaking and fever during those days. Daughter in law also noted pt reported pt has poor appetite 4-5 days prior to admit. Denied chewing or swallowing concerns. No known food allergies. Pt unable to report wt hx, daughter in law reported pt was likely around 58 kg last year and had "some intentional weight loss" believes UBW in the past 6-7 month was likely 52 kg. no recent HIE wts or as per chart review. Current admit wt noted to be 51 kg weight change based on reported wt of 58 kg last year reflects 12% weight loss in 1 year

## 2024-10-11 NOTE — PROGRESS NOTE ADULT - PROBLEM SELECTOR PLAN 1
Met sepsis criteria on admission w/ fever and leukocytosis   Source likely urine  - complicated by E coli bacteremia  - Repeat BCx from 10/6 came back positive today 10/11 in 1/4 bottles. Will repeat BCx   --c/w IV Meropenem  - Per pharmacy, no oral option for dc based on pt allergies   - Will consult ID in AM

## 2024-10-11 NOTE — DIETITIAN INITIAL EVALUATION ADULT - ADD RECOMMEND
Monitor weights, labs, BM's, skin integrity, p.o. intake.   Defer fluid management to medical team   Please monitor % PO intake on flowsheets  Honor food preferences as able within therapeutic diet order.  consider daily multivitamins

## 2024-10-11 NOTE — DIETITIAN INITIAL EVALUATION ADULT - NSICDXPASTMEDICALHX_GEN_ALL_CORE_FT
PAST MEDICAL HISTORY:  Benign essential HTN     Current chronic use of systemic steroids     H/O migratory polyarthritis     HLD (hyperlipidemia)     Hypothyroidism     Stage 3 chronic kidney disease     T2DM (type 2 diabetes mellitus)

## 2024-10-12 LAB
ANION GAP SERPL CALC-SCNC: 15 MMOL/L — HIGH (ref 7–14)
BUN SERPL-MCNC: 50 MG/DL — HIGH (ref 7–23)
CALCIUM SERPL-MCNC: 8.1 MG/DL — LOW (ref 8.4–10.5)
CHLORIDE SERPL-SCNC: 102 MMOL/L — SIGNIFICANT CHANGE UP (ref 98–107)
CO2 SERPL-SCNC: 22 MMOL/L — SIGNIFICANT CHANGE UP (ref 22–31)
CREAT SERPL-MCNC: 1.95 MG/DL — HIGH (ref 0.5–1.3)
EGFR: 28 ML/MIN/1.73M2 — LOW
GLUCOSE SERPL-MCNC: 80 MG/DL — SIGNIFICANT CHANGE UP (ref 70–99)
HCT VFR BLD CALC: 26.6 % — LOW (ref 34.5–45)
HGB BLD-MCNC: 9.1 G/DL — LOW (ref 11.5–15.5)
MAGNESIUM SERPL-MCNC: 1.7 MG/DL — SIGNIFICANT CHANGE UP (ref 1.6–2.6)
MCHC RBC-ENTMCNC: 29.7 PG — SIGNIFICANT CHANGE UP (ref 27–34)
MCHC RBC-ENTMCNC: 34.2 GM/DL — SIGNIFICANT CHANGE UP (ref 32–36)
MCV RBC AUTO: 86.9 FL — SIGNIFICANT CHANGE UP (ref 80–100)
NRBC # BLD: 0 /100 WBCS — SIGNIFICANT CHANGE UP (ref 0–0)
NRBC # FLD: 0 K/UL — SIGNIFICANT CHANGE UP (ref 0–0)
PHOSPHATE SERPL-MCNC: 3.6 MG/DL — SIGNIFICANT CHANGE UP (ref 2.5–4.5)
PLATELET # BLD AUTO: 249 K/UL — SIGNIFICANT CHANGE UP (ref 150–400)
POTASSIUM SERPL-MCNC: 4.2 MMOL/L — SIGNIFICANT CHANGE UP (ref 3.5–5.3)
POTASSIUM SERPL-SCNC: 4.2 MMOL/L — SIGNIFICANT CHANGE UP (ref 3.5–5.3)
RBC # BLD: 3.06 M/UL — LOW (ref 3.8–5.2)
RBC # FLD: 15.8 % — HIGH (ref 10.3–14.5)
SODIUM SERPL-SCNC: 139 MMOL/L — SIGNIFICANT CHANGE UP (ref 135–145)
WBC # BLD: 9.07 K/UL — SIGNIFICANT CHANGE UP (ref 3.8–10.5)
WBC # FLD AUTO: 9.07 K/UL — SIGNIFICANT CHANGE UP (ref 3.8–10.5)

## 2024-10-12 PROCEDURE — 99232 SBSQ HOSP IP/OBS MODERATE 35: CPT

## 2024-10-12 PROCEDURE — 99222 1ST HOSP IP/OBS MODERATE 55: CPT

## 2024-10-12 RX ORDER — ERTAPENEM 1 G/1
500 INJECTION, POWDER, LYOPHILIZED, FOR SOLUTION INTRAMUSCULAR; INTRAVENOUS EVERY 24 HOURS
Refills: 0 | Status: DISCONTINUED | OUTPATIENT
Start: 2024-10-12 | End: 2024-10-16

## 2024-10-12 RX ADMIN — Medication 75 MILLILITER(S): at 00:50

## 2024-10-12 RX ADMIN — CHLORHEXIDINE GLUCONATE ORAL RINSE 1 APPLICATION(S): 1.2 SOLUTION DENTAL at 13:11

## 2024-10-12 RX ADMIN — MEROPENEM 100 MILLIGRAM(S): 500 INJECTION INTRAVENOUS at 05:12

## 2024-10-12 RX ADMIN — Medication 5000 UNIT(S): at 21:46

## 2024-10-12 RX ADMIN — PANTOPRAZOLE SODIUM 40 MILLIGRAM(S): 40 TABLET, DELAYED RELEASE ORAL at 05:11

## 2024-10-12 RX ADMIN — MULTI VITAMIN/MINERAL SUPPLEMENT WITH ASCORBIC ACID, NIACIN, PYRIDOXINE, PANTOTHENIC ACID, FOLIC ACID, RIBOFLAVIN, THIAMIN, BIOTIN, COBALAMIN AND ZINC. 1 TABLET(S): 60; 20; 12.5; 10; 10; 1.7; 1.5; 1; .3; .006 TABLET, COATED ORAL at 18:42

## 2024-10-12 RX ADMIN — Medication 5000 UNIT(S): at 04:37

## 2024-10-12 RX ADMIN — Medication 400 MILLIGRAM(S): at 12:59

## 2024-10-12 RX ADMIN — Medication 50 MICROGRAM(S): at 05:12

## 2024-10-12 RX ADMIN — ERTAPENEM 100 MILLIGRAM(S): 1 INJECTION, POWDER, LYOPHILIZED, FOR SOLUTION INTRAMUSCULAR; INTRAVENOUS at 18:41

## 2024-10-12 RX ADMIN — Medication 5000 UNIT(S): at 12:59

## 2024-10-12 RX ADMIN — Medication 2.5 MILLIGRAM(S): at 05:12

## 2024-10-12 RX ADMIN — Medication 2000 UNIT(S): at 12:59

## 2024-10-12 NOTE — CONSULT NOTE ADULT - ASSESSMENT
69 yr old (Nepali speaking) female presenting with hx of HTN, DM II CKD, migratory polyarthralgias (on prednisone) who was admitted on 10/4  with dysuria, emesis, poor PO intake, fevers, chills.    On admission she was febrile with high leukocytosis. Found to have E. Coli bacteremia on 10/4 with urine cultures also with E. Coli. UA was grossly positive. She was started on meropenem with improvement in clinical status; however repeat blood cultures obtained on 10/6 are now also growing E. coli    Fever and leukocytosis have resolved  Severe MICHAEL on admission which is improved but still elevated  Renal KUB was normal on 10/5  CXR clear  Repeat blood cultures from 10/11 are pending  Charted allergy to cefixime and ciprofloxacin    Overall, E. Coli bacteremia, complicated UTI 69 yr old (Albanian speaking) female presenting with hx of HTN, DM II CKD, migratory polyarthralgias (on prednisone) who was admitted on 10/4  with dysuria, emesis, poor PO intake, fevers, chills.    On admission she was febrile with high leukocytosis. Found to have high grade E. Coli bacteremia on 10/4 with urine cultures also with E. Coli. UA was grossly positive. She was started on meropenem with improvement in clinical status; however repeat blood cultures obtained on 10/6 are now also growing E. coli in 1/4 bottles    Fever and leukocytosis have resolved  Severe MICHAEL on admission which is improved but still elevated  Renal KUB was normal on 10/5  CXR clear  Repeat blood cultures from 10/11 are pending  Charted allergy to cefixime and ciprofloxacin - patient is unable to recall which antibiotics to which she was allergic    Overall, E. Coli bacteremia, complicated UTI. Repeat cultures with 1/4 bottles of E. Coli, however was initially high grade and was only given one day of antibiotics. Suspect she needed more treatment prior to re-evaluation. She is otherwise clinically improving.  - switched to ertapenem 500 mg q24h (renally-dosed)  - follow-up blood cultures from 10/11 - note will need to be negative for at least 48 hours prior to considering outpatient treatment  - monitor clinical status - temperature, WBC count, symptoms, etc.    d/w attending.  Seen with [employer] ID#[id number]    Júnior Fuentes, PGY5  ID Fellow  [employer] Preferred  After 5pm/weekends call [phone]

## 2024-10-12 NOTE — PROGRESS NOTE ADULT - PROBLEM SELECTOR PLAN 1
Met sepsis criteria on admission w/ fever and leukocytosis   Source likely urine  - complicated by E coli bacteremia  - Repeat BCx from 10/6 came back positive today 10/11 in 1/4 bottles. Will repeat BCx  --c/w IV Meropenem  - Per pharmacy, no oral option for dc based on pt allergies   - ID consulted, f/u recs

## 2024-10-12 NOTE — PROGRESS NOTE ADULT - ASSESSMENT
69 yr old (Serbian speaking) female presenting with hx of HTN, CKD a/w sepsis w/ EColi bacteremia likely i/s/o UTI, Also w/ MICHAEL on CKD

## 2024-10-12 NOTE — CONSULT NOTE ADULT - SUBJECTIVE AND OBJECTIVE BOX
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 Also endorsing intermittent SOB  Denies  headache, dizziness, chest pain, palpitations,  abdominal pain, joint pain, diarrhea/constipation.  Vitals: T 1015. , BP 77/51-> 92/48,  satting 95% RA (04 Oct 2024 20:04)       REVIEW OF SYSTEMS  Constitutional: No fevers, chills, weight loss or fatigue   Skin: No rash, no phlebitis	  Eyes: No discharge	  ENMT: No sore throat, oral thrush, ulcers or exudate  Respiratory: No cough, no SOB  Cardiovascular:  No chest pain, palpitations or edema   Gastrointestinal: No pain, nausea, vomiting, diarrhea or constipation	  Genitourinary: No dysuria, discharge or flank pain  MSK: No arthralgias or back pain   Neurological: No HA, no weakness, no seizures, no AMS       prior hospital charts reviewed [V]  primary team notes reviewed [V]  other consultant notes reviewed [V]    PAST MEDICAL & SURGICAL HISTORY:  Benign essential HTN      T2DM (type 2 diabetes mellitus)      HLD (hyperlipidemia)      Stage 3 chronic kidney disease      H/O migratory polyarthritis      Current chronic use of systemic steroids      Hypothyroidism      No significant past surgical history    SOCIAL HISTORY:  - Denied smoking/vaping/alcohol/recreational drug use    FAMILY HISTORY:  No pertinent family history in first degree relatives    Allergies  cefixime (Rash; Urticaria; Hives)  ciprofloxacin (Other)    ANTIMICROBIALS:  meropenem  IVPB 500 every 12 hours      ANTIMICROBIALS (past 90 days):  MEDICATIONS  (STANDING):  meropenem  IVPB   100 mL/Hr IV Intermittent (10-05-24 @ 07:51)    meropenem  IVPB   100 mL/Hr IV Intermittent (10-12-24 @ 05:12)   100 mL/Hr IV Intermittent (10-11-24 @ 18:26)   100 mL/Hr IV Intermittent (10-11-24 @ 06:01)   100 mL/Hr IV Intermittent (10-10-24 @ 17:51)   100 mL/Hr IV Intermittent (10-10-24 @ 05:19)   100 mL/Hr IV Intermittent (10-09-24 @ 18:29)   100 mL/Hr IV Intermittent (10-09-24 @ 05:31)   100 mL/Hr IV Intermittent (10-08-24 @ 17:13)   100 mL/Hr IV Intermittent (10-08-24 @ 05:01)   100 mL/Hr IV Intermittent (10-07-24 @ 18:25)   100 mL/Hr IV Intermittent (10-07-24 @ 05:00)   100 mL/Hr IV Intermittent (10-06-24 @ 19:20)   100 mL/Hr IV Intermittent (10-06-24 @ 05:22)   100 mL/Hr IV Intermittent (10-05-24 @ 19:50)    meropenem  IVPB   100 mL/Hr IV Intermittent (10-04-24 @ 13:31)        OTHER MEDS:   MEDICATIONS  (STANDING):  acetaminophen     Tablet .. 650 every 6 hours PRN  aluminum hydroxide/magnesium hydroxide/simethicone Suspension 30 every 4 hours PRN  dextrose 50% Injectable 25 once  dextrose 50% Injectable 12.5 once  dextrose 50% Injectable 25 once  dextrose Oral Gel 15 once PRN  glucagon  Injectable 1 once  heparin   Injectable 5000 every 8 hours  insulin lispro (ADMELOG) corrective regimen sliding scale  at bedtime  insulin lispro (ADMELOG) corrective regimen sliding scale  three times a day before meals  levothyroxine 50 daily  melatonin 3 at bedtime PRN  ondansetron Injectable 4 every 8 hours PRN  pantoprazole    Tablet 40 before breakfast  polyethylene glycol 3350 17 daily  prednisoLONE Syrup 3 mG/mL (PRELONE) 2.5 every 24 hours      VITALS:  Vital Signs Last 24 Hrs  T(F): 98.6 (10-12-24 @ 13:07), Max: 98.9 (10-10-24 @ 05:00)    Vital Signs Last 24 Hrs  HR: 72 (10-12-24 @ 13:07) (72 - 91)  BP: 132/71 (10-12-24 @ 13:07) (132/71 - 137/71)  RR: 18 (10-12-24 @ 13:07)  SpO2: 94% (10-12-24 @ 13:07) (94% - 97%)  Wt(kg): --    EXAM:  General: Patient in no acute distress   HEENT: NCAT, EOMI, PERRL, no oral lesions  CV: S1+S2, no m/r/g appreciated   Lungs: No respiratory distress, CTAB  Abd: Soft, nontender, no guarding, no rebound tenderness, + bowel sounds   Ext: No cyanosis, no edema  Neuro: Alert and oriented, no focal deficits, CN II-XII grossly intact   Skin: No rash   IV: No phlebitis      Labs:                        9.1    9.07  )-----------( 249      ( 12 Oct 2024 05:38 )             26.6     10-12    139  |  102  |  50[H]  ----------------------------<  80  4.2   |  22  |  1.95[H]    Ca    8.1[L]      12 Oct 2024 05:38  Phos  3.6     10-12  Mg     1.70     10-12        WBC Trend:  WBC Count: 9.07 (10-12-24 @ 05:38)  WBC Count: 10.05 (10-11-24 @ 05:33)  WBC Count: 11.42 (10-10-24 @ 05:46)  WBC Count: 10.58 (10-09-24 @ 05:25)      Auto Neutrophil #: 5.79 K/uL (10-11-24 @ 05:33)  Band Neutrophils %: 0.8 % (10-11-24 @ 05:33)  Auto Neutrophil #: 10.10 K/uL (10-07-24 @ 06:00)  Auto Neutrophil #: 14.08 K/uL (10-06-24 @ 05:56)  Auto Neutrophil #: 21.45 K/uL (10-05-24 @ 06:45)      Creatine Trend:  Creatinine: 1.95 (10-12)  Creatinine: 1.93 (10-11)  Creatinine: 2.00 (10-10)  Creatinine: 2.35 (10-09)      Liver Biochemical Testing Trend:  Alanine Aminotransferase (ALT/SGPT): 17 (10-04)  Aspartate Aminotransferase (AST/SGOT): 22 (10-04-24 @ 12:45)  Bilirubin Total: 0.4 (10-04)    Auto Eosinophil %: 11.0 % (10-11-24 @ 05:33)      Urinalysis Basic - ( 12 Oct 2024 05:38 )    Color: x / Appearance: x / SG: x / pH: x  Gluc: 80 mg/dL / Ketone: x  / Bili: x / Urobili: x   Blood: x / Protein: x / Nitrite: x   Leuk Esterase: x / RBC: x / WBC x   Sq Epi: x / Non Sq Epi: x / Bacteria: x    MICROBIOLOGY:    MRSA PCR Result.: NotDete (10-05-24 @ 04:00)    Culture - Blood (collected 06 Oct 2024 05:09)  Source: .Blood BLOOD  Preliminary Report:    Growth in anaerobic bottle: Escherichia coli    Susceptibility to follow.  Organism: Blood Culture PCR  Organism: Blood Culture PCR    Sensitivities:      Method Type: PCR      -  Escherichia coli: Detec    Culture - Blood (collected 06 Oct 2024 05:08)  Source: .Blood BLOOD  Final Report:    No growth at 5 days    Culture - Urine (collected 04 Oct 2024 15:30)  Source: Clean Catch  Final Report:    10,000 - 49,000 CFU/mL Escherichia coli  Organism: Escherichia coli  Organism: Escherichia coli    Sensitivities:      Method Type: OG      -  Amoxicillin/Clavulanic Acid: S <=8/4      -  Ampicillin: S <=8 These ampicillin results predict results for amoxicillin      -  Ampicillin/Sulbactam: S <=4/2      -  Aztreonam: S <=4      -  Cefazolin: S <=2 For uncomplicated UTI with K. pneumoniae, E. coli, or P. mirablis: OG <=16 is sensitive and OG >=32 is resistant. This also predicts results for oral agents cefaclor, cefdinir, cefpodoxime, cefprozil, cefuroxime axetil, cephalexin and locarbef for uncomplicated UTI. Note that some isolates may be susceptible to these agents while testing resistant to cefazolin.      -  Cefepime: S <=2      -  Cefoxitin: S <=8      -  Ceftriaxone: S <=1      -  Cefuroxime: S <=4      -  Ciprofloxacin: S <=0.25      -  Ertapenem: S <=0.5      -  Gentamicin: S <=2      -  Imipenem: S <=1      -  Levofloxacin: S <=0.5      -  Meropenem: S <=1      -  Nitrofurantoin: S <=32 Should not be used to treat pyelonephritis      -  Piperacillin/Tazobactam: S <=8      -  Tobramycin: S <=2      -  Trimethoprim/Sulfamethoxazole: R >2/38    Urinalysis with Rflx Culture (collected 04 Oct 2024 15:30)    Culture - Blood (collected 04 Oct 2024 12:42)  Source: .Blood BLOOD  Final Report:    Growth in aerobic and anaerobic bottles: Escherichia coli    Direct identification is available within approximately 3-5    hours either by Blood Panel Multiplexed PCR or Direct    MALDI-TOF. Details: [email or web address]  Organism: Blood Culture PCR  Escherichia coli  Organism: Escherichia coli    Sensitivities:      Method Type: OG      -  Ampicillin: S <=8 These ampicillin results predict results for amoxicillin      -  Ampicillin/Sulbactam: S <=4/2      -  Aztreonam: S <=4      -  Cefazolin: S <=2      -  Cefepime: S <=2      -  Cefoxitin: S <=8      -  Ceftriaxone: S <=1      -  Ciprofloxacin: S <=0.25      -  Ertapenem: S <=0.5      -  Gentamicin: S <=2      -  Imipenem: S <=1      -  Levofloxacin: S <=0.5      -  Meropenem: S <=1      -  Piperacillin/Tazobactam: S <=8      -  Tobramycin: S <=2      -  Trimethoprim/Sulfamethoxazole: R >2/38  Organism: Blood Culture PCR    Sensitivities:      Method Type: PCR      -  Escherichia coli: Detec    Culture - Blood (collected 04 Oct 2024 12:42)  Source: .Blood BLOOD  Final Report:    Growth in aerobic and anaerobic bottles: Escherichia coli    See previous culture [id number]    A1C with Estimated Average Glucose Result: 5.3 % (10-05-24 @ 06:45)      RADIOLOGY:  imaging below personally reviewed    < from: US Kidney and Bladder (10.05.24 @ 10:41) >  FINDINGS:  Right kidney: 9.1 cm. No renal mass, hydronephrosis or calculi.    Left kidney: 9.6 cm. No renal mass, hydronephrosis or calculi.    Urinary bladder: Within normal limits.    IMPRESSION:  Normal renal ultrasound.    < end of copied text >  < from: Xray Chest 1 View- PORTABLE-Urgent (10.04.24 @ 13:34) >  FINDINGS:  The cardiac silhouette is normal in size. There is elevation   of the right hemidiaphragm. There are no focal consolidations or pleural   effusions. The hilar and mediastinal structures appear unremarkable. The   osseous structures are intact.    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 Also endorsing intermittent SOB  Denies  headache, dizziness, chest pain, palpitations,  abdominal pain, joint pain, diarrhea/constipation.  Vitals: T 1015. , BP 77/51-> 92/48,  satting 95% RA (04 Oct 2024 20:04)       REVIEW OF SYSTEMS  Constitutional: No fevers, chills, weight loss or fatigue   Skin: No rash, no phlebitis	  Eyes: No discharge	  ENMT: No sore throat, oral thrush, ulcers or exudate  Respiratory: No cough, no SOB  Cardiovascular:  No chest pain, palpitations or edema   Gastrointestinal: No pain, nausea, vomiting, diarrhea or constipation	  Genitourinary: No dysuria, discharge or flank pain  MSK: No arthralgias or back pain   Neurological: No HA, no weakness, no seizures, no AMS       prior hospital charts reviewed [V]  primary team notes reviewed [V]  other consultant notes reviewed [V]    PAST MEDICAL & SURGICAL HISTORY:  Benign essential HTN      T2DM (type 2 diabetes mellitus)      HLD (hyperlipidemia)      Stage 3 chronic kidney disease      H/O migratory polyarthritis      Current chronic use of systemic steroids      Hypothyroidism      No significant past surgical history    SOCIAL HISTORY:  - Denied smoking/vaping/alcohol/recreational drug use    FAMILY HISTORY:  No pertinent family history in first degree relatives    Allergies  cefixime (Rash; Urticaria; Hives)  ciprofloxacin (Other)    ANTIMICROBIALS:  meropenem  IVPB 500 every 12 hours      ANTIMICROBIALS (past 90 days):  MEDICATIONS  (STANDING):  meropenem  IVPB   100 mL/Hr IV Intermittent (10-05-24 @ 07:51)    meropenem  IVPB   100 mL/Hr IV Intermittent (10-12-24 @ 05:12)   100 mL/Hr IV Intermittent (10-11-24 @ 18:26)   100 mL/Hr IV Intermittent (10-11-24 @ 06:01)   100 mL/Hr IV Intermittent (10-10-24 @ 17:51)   100 mL/Hr IV Intermittent (10-10-24 @ 05:19)   100 mL/Hr IV Intermittent (10-09-24 @ 18:29)   100 mL/Hr IV Intermittent (10-09-24 @ 05:31)   100 mL/Hr IV Intermittent (10-08-24 @ 17:13)   100 mL/Hr IV Intermittent (10-08-24 @ 05:01)   100 mL/Hr IV Intermittent (10-07-24 @ 18:25)   100 mL/Hr IV Intermittent (10-07-24 @ 05:00)   100 mL/Hr IV Intermittent (10-06-24 @ 19:20)   100 mL/Hr IV Intermittent (10-06-24 @ 05:22)   100 mL/Hr IV Intermittent (10-05-24 @ 19:50)    meropenem  IVPB   100 mL/Hr IV Intermittent (10-04-24 @ 13:31)        OTHER MEDS:   MEDICATIONS  (STANDING):  acetaminophen     Tablet .. 650 every 6 hours PRN  aluminum hydroxide/magnesium hydroxide/simethicone Suspension 30 every 4 hours PRN  dextrose 50% Injectable 25 once  dextrose 50% Injectable 12.5 once  dextrose 50% Injectable 25 once  dextrose Oral Gel 15 once PRN  glucagon  Injectable 1 once  heparin   Injectable 5000 every 8 hours  insulin lispro (ADMELOG) corrective regimen sliding scale  at bedtime  insulin lispro (ADMELOG) corrective regimen sliding scale  three times a day before meals  levothyroxine 50 daily  melatonin 3 at bedtime PRN  ondansetron Injectable 4 every 8 hours PRN  pantoprazole    Tablet 40 before breakfast  polyethylene glycol 3350 17 daily  prednisoLONE Syrup 3 mG/mL (PRELONE) 2.5 every 24 hours      VITALS:  Vital Signs Last 24 Hrs  T(F): 98.6 (10-12-24 @ 13:07), Max: 98.9 (10-10-24 @ 05:00)    Vital Signs Last 24 Hrs  HR: 72 (10-12-24 @ 13:07) (72 - 91)  BP: 132/71 (10-12-24 @ 13:07) (132/71 - 137/71)  RR: 18 (10-12-24 @ 13:07)  SpO2: 94% (10-12-24 @ 13:07) (94% - 97%)  Wt(kg): --    EXAM:  General: Patient in no acute distress   HEENT: NCAT, EOMI, PERRL, no oral lesions  CV: S1+S2, no m/r/g appreciated   Lungs: No respiratory distress, crackles LLL  Abd: Soft, nontender, no guarding, no CVAT  Ext: No cyanosis, no edema  Neuro: Alert and oriented  Skin: No rash   IV: No phlebitis      Labs:                        9.1    9.07  )-----------( 249      ( 12 Oct 2024 05:38 )             26.6     10-12    139  |  102  |  50[H]  ----------------------------<  80  4.2   |  22  |  1.95[H]    Ca    8.1[L]      12 Oct 2024 05:38  Phos  3.6     10-12  Mg     1.70     10-12        WBC Trend:  WBC Count: 9.07 (10-12-24 @ 05:38)  WBC Count: 10.05 (10-11-24 @ 05:33)  WBC Count: 11.42 (10-10-24 @ 05:46)  WBC Count: 10.58 (10-09-24 @ 05:25)      Auto Neutrophil #: 5.79 K/uL (10-11-24 @ 05:33)  Band Neutrophils %: 0.8 % (10-11-24 @ 05:33)  Auto Neutrophil #: 10.10 K/uL (10-07-24 @ 06:00)  Auto Neutrophil #: 14.08 K/uL (10-06-24 @ 05:56)  Auto Neutrophil #: 21.45 K/uL (10-05-24 @ 06:45)      Creatine Trend:  Creatinine: 1.95 (10-12)  Creatinine: 1.93 (10-11)  Creatinine: 2.00 (10-10)  Creatinine: 2.35 (10-09)      Liver Biochemical Testing Trend:  Alanine Aminotransferase (ALT/SGPT): 17 (10-04)  Aspartate Aminotransferase (AST/SGOT): 22 (10-04-24 @ 12:45)  Bilirubin Total: 0.4 (10-04)    Auto Eosinophil %: 11.0 % (10-11-24 @ 05:33)      Urinalysis Basic - ( 12 Oct 2024 05:38 )    Color: x / Appearance: x / SG: x / pH: x  Gluc: 80 mg/dL / Ketone: x  / Bili: x / Urobili: x   Blood: x / Protein: x / Nitrite: x   Leuk Esterase: x / RBC: x / WBC x   Sq Epi: x / Non Sq Epi: x / Bacteria: x    MICROBIOLOGY:    MRSA PCR Result.: Erica (10-05-24 @ 04:00)    Culture - Blood (collected 06 Oct 2024 05:09)  Source: .Blood BLOOD  Preliminary Report:    Growth in anaerobic bottle: Escherichia coli    Susceptibility to follow.  Organism: Blood Culture PCR  Organism: Blood Culture PCR    Sensitivities:      Method Type: PCR      -  Escherichia coli: Detec    Culture - Blood (collected 06 Oct 2024 05:08)  Source: .Blood BLOOD  Final Report:    No growth at 5 days    Culture - Urine (collected 04 Oct 2024 15:30)  Source: Clean Catch  Final Report:    10,000 - 49,000 CFU/mL Escherichia coli  Organism: Escherichia coli  Organism: Escherichia coli    Sensitivities:      Method Type: OG      -  Amoxicillin/Clavulanic Acid: S <=8/4      -  Ampicillin: S <=8 These ampicillin results predict results for amoxicillin      -  Ampicillin/Sulbactam: S <=4/2      -  Aztreonam: S <=4      -  Cefazolin: S <=2 For uncomplicated UTI with K. pneumoniae, E. coli, or P. mirablis: OG <=16 is sensitive and OG >=32 is resistant. This also predicts results for oral agents cefaclor, cefdinir, cefpodoxime, cefprozil, cefuroxime axetil, cephalexin and locarbef for uncomplicated UTI. Note that some isolates may be susceptible to these agents while testing resistant to cefazolin.      -  Cefepime: S <=2      -  Cefoxitin: S <=8      -  Ceftriaxone: S <=1      -  Cefuroxime: S <=4      -  Ciprofloxacin: S <=0.25      -  Ertapenem: S <=0.5      -  Gentamicin: S <=2      -  Imipenem: S <=1      -  Levofloxacin: S <=0.5      -  Meropenem: S <=1      -  Nitrofurantoin: S <=32 Should not be used to treat pyelonephritis      -  Piperacillin/Tazobactam: S <=8      -  Tobramycin: S <=2      -  Trimethoprim/Sulfamethoxazole: R >2/38    Urinalysis with Rflx Culture (collected 04 Oct 2024 15:30)    Culture - Blood (collected 04 Oct 2024 12:42)  Source: .Blood BLOOD  Final Report:    Growth in aerobic and anaerobic bottles: Escherichia coli    Direct identification is available within approximately 3-5    hours either by Blood Panel Multiplexed PCR or Direct    MALDI-TOF. Details: [email or web address]  Organism: Blood Culture PCR  Escherichia coli  Organism: Escherichia coli    Sensitivities:      Method Type: OG      -  Ampicillin: S <=8 These ampicillin results predict results for amoxicillin      -  Ampicillin/Sulbactam: S <=4/2      -  Aztreonam: S <=4      -  Cefazolin: S <=2      -  Cefepime: S <=2      -  Cefoxitin: S <=8      -  Ceftriaxone: S <=1      -  Ciprofloxacin: S <=0.25      -  Ertapenem: S <=0.5      -  Gentamicin: S <=2      -  Imipenem: S <=1      -  Levofloxacin: S <=0.5      -  Meropenem: S <=1      -  Piperacillin/Tazobactam: S <=8      -  Tobramycin: S <=2      -  Trimethoprim/Sulfamethoxazole: R >2/38  Organism: Blood Culture PCR    Sensitivities:      Method Type: PCR      -  Escherichia coli: Detec    Culture - Blood (collected 04 Oct 2024 12:42)  Source: .Blood BLOOD  Final Report:    Growth in aerobic and anaerobic bottles: Escherichia coli    See previous culture [id number]    A1C with Estimated Average Glucose Result: 5.3 % (10-05-24 @ 06:45)      RADIOLOGY:  imaging below personally reviewed    < from: US Kidney and Bladder (10.05.24 @ 10:41) >  FINDINGS:  Right kidney: 9.1 cm. No renal mass, hydronephrosis or calculi.    Left kidney: 9.6 cm. No renal mass, hydronephrosis or calculi.    Urinary bladder: Within normal limits.    IMPRESSION:  Normal renal ultrasound.    < end of copied text >  < from: Xray Chest 1 View- PORTABLE-Urgent (10.04.24 @ 13:34) >  FINDINGS:  The cardiac silhouette is normal in size. There is elevation   of the right hemidiaphragm. There are no focal consolidations or pleural   effusions. The hilar and mediastinal structures appear unremarkable. The   osseous structures are intact.    IMPRESSION: Elevated right hemidiaphragm with clear lung    < end of copied text >

## 2024-10-12 NOTE — CONSULT NOTE ADULT - ATTENDING COMMENTS
69 yr old (Turkish speaking) female presenting with hx of HTN, DM II CKD, migratory polyarthralgia (on prednisone) who was admitted on 10/4  with dysuria, emesis, poor PO intake, fevers, chills. On admission she was febrile with high leukocytosis. Found to have high grade E. Coli bacteremia on 10/4 with urine cultures also with E. Coli. UA was grossly positive. She was started on meropenem with improvement in clinical status; however repeat blood cultures obtained on 10/6 are now also growing E. coli in 1/4 bottles. Her Fever and leukocytosis have resolved. Severe MICHAEL on admission which is improved but still elevated. Renal KUB was normal on 10/5. CXR clear. Repeat blood cultures from 10/11 are pending  Charted allergy to cefixime and ciprofloxacin - patient is unable to recall which antibiotics to which she was allergic. Infectious diseases consultation requested today for further management.     # E. Coli bacteremia from urinary source   # Complicated UTI  # Leucocytosis-- now resolved  # Acute kidney injury superimposed on CKD.   # H/O migratory polyarthritis on steroids    # Allergy history to cefixime and ciprofloxacin     MICRO:   10/04 BCX: 4/4 E.coli ( R- bactrim)   10/04 UCX: E.coli   10/06 BCX: 1/4 E.coli   10/11 BCX: IP     Repeat cultures from 10/06 with 1/4 bottles of E. Coli, however was initially high grade on 10/04 and was only given one day of antibiotics. Suspect she needed more treatment prior to re-evaluation. She is otherwise clinically improving. WBC has now normalized. She remains afebrile. Abdominal examination benign.       RECOMMENDATIONS:   - Stop meropenem  - Start ertapenem 500 mg q24h (renally-dosed) Crcl <30   - follow-up blood cultures from 10/11 - If positive, will likely need to get CTAP  - monitor clinical status - temperature, WBC count, symptoms, etc.      Discussed with the primary team    Carlota Alicia MD  Attending Physician, Division of Infectious Diseases  Department of Medicine   [facility]    Contact on TEAMS messaging from 9am - 5pm  Office: [phone] (after 5 PM or weekend)

## 2024-10-12 NOTE — PROGRESS NOTE ADULT - ASSESSMENT
69-year-old female comes into the ER with Symptoms since yesterday of fevers up to 102, cough general malaise weakness rigors.  nephrology consulted for acute on ckd and electrolyte anonymities    Acute on Chronic Kidney Disease stage 3  Baseline ~ 1.5  MICHAEL possible prerenal   FeNa<1%  Renal US unremarkable.  ARB on hold  Renal function improving elevated bun improving on LR.   - monitor BMP and Urine Output.  - avoid nephrotoxic agents    Hyponatremia  Associated with Hypovolemia.  improved  - avoid overcorrection, na should not correct >8meq in 24 hrs    fever  Possibly d/t UTI.  Workup and management per team.    Metabolic Acidosis  non AG  improved s/p bicarb gtt now on LR   monitor    hypocalcemia  Repeat  - high for CKD stage.  Likely in setting of hypoalbuminemia vs Vit. D insufficiency - Vit. D 25OH 25.4.  Start Vit D3 2000units qd.  Optimize albumin.  monitor    proteinuria  check urine p/c ratio  known Arthritis and Sjogren syndrome follows rheum  being followed in office     hypotensive  Bp stable  Monitor     Proteinuria/Hematuria  Possibly in setting of UTI vs. DM.  UPr/Cr 1.1gm.  Repeat UA after completing antibiotics.  Workup outpatient.  ARB on hold.  Monitor.

## 2024-10-13 LAB
-  AMPICILLIN/SULBACTAM: SIGNIFICANT CHANGE UP
-  AMPICILLIN: SIGNIFICANT CHANGE UP
-  AZTREONAM: SIGNIFICANT CHANGE UP
-  CEFAZOLIN: SIGNIFICANT CHANGE UP
-  CEFEPIME: SIGNIFICANT CHANGE UP
-  CEFOXITIN: SIGNIFICANT CHANGE UP
-  CEFTRIAXONE: SIGNIFICANT CHANGE UP
-  CIPROFLOXACIN: SIGNIFICANT CHANGE UP
-  ERTAPENEM: SIGNIFICANT CHANGE UP
-  GENTAMICIN: SIGNIFICANT CHANGE UP
-  IMIPENEM: SIGNIFICANT CHANGE UP
-  LEVOFLOXACIN: SIGNIFICANT CHANGE UP
-  MEROPENEM: SIGNIFICANT CHANGE UP
-  PIPERACILLIN/TAZOBACTAM: SIGNIFICANT CHANGE UP
-  TOBRAMYCIN: SIGNIFICANT CHANGE UP
-  TRIMETHOPRIM/SULFAMETHOXAZOLE: SIGNIFICANT CHANGE UP
ANION GAP SERPL CALC-SCNC: 12 MMOL/L — SIGNIFICANT CHANGE UP (ref 7–14)
BUN SERPL-MCNC: 45 MG/DL — HIGH (ref 7–23)
CALCIUM SERPL-MCNC: 8.4 MG/DL — SIGNIFICANT CHANGE UP (ref 8.4–10.5)
CHLORIDE SERPL-SCNC: 103 MMOL/L — SIGNIFICANT CHANGE UP (ref 98–107)
CO2 SERPL-SCNC: 22 MMOL/L — SIGNIFICANT CHANGE UP (ref 22–31)
CREAT SERPL-MCNC: 1.77 MG/DL — HIGH (ref 0.5–1.3)
CULTURE RESULTS: ABNORMAL
EGFR: 31 ML/MIN/1.73M2 — LOW
GLUCOSE SERPL-MCNC: 96 MG/DL — SIGNIFICANT CHANGE UP (ref 70–99)
HCT VFR BLD CALC: 29.8 % — LOW (ref 34.5–45)
HGB BLD-MCNC: 9.9 G/DL — LOW (ref 11.5–15.5)
MAGNESIUM SERPL-MCNC: 1.8 MG/DL — SIGNIFICANT CHANGE UP (ref 1.6–2.6)
MCHC RBC-ENTMCNC: 29.6 PG — SIGNIFICANT CHANGE UP (ref 27–34)
MCHC RBC-ENTMCNC: 33.2 GM/DL — SIGNIFICANT CHANGE UP (ref 32–36)
MCV RBC AUTO: 89.2 FL — SIGNIFICANT CHANGE UP (ref 80–100)
METHOD TYPE: SIGNIFICANT CHANGE UP
NRBC # BLD: 0 /100 WBCS — SIGNIFICANT CHANGE UP (ref 0–0)
NRBC # FLD: 0 K/UL — SIGNIFICANT CHANGE UP (ref 0–0)
ORGANISM # SPEC MICROSCOPIC CNT: ABNORMAL
PHOSPHATE SERPL-MCNC: 3.8 MG/DL — SIGNIFICANT CHANGE UP (ref 2.5–4.5)
PLATELET # BLD AUTO: 259 K/UL — SIGNIFICANT CHANGE UP (ref 150–400)
POTASSIUM SERPL-MCNC: 3.9 MMOL/L — SIGNIFICANT CHANGE UP (ref 3.5–5.3)
POTASSIUM SERPL-SCNC: 3.9 MMOL/L — SIGNIFICANT CHANGE UP (ref 3.5–5.3)
RBC # BLD: 3.34 M/UL — LOW (ref 3.8–5.2)
RBC # FLD: 15.8 % — HIGH (ref 10.3–14.5)
SODIUM SERPL-SCNC: 137 MMOL/L — SIGNIFICANT CHANGE UP (ref 135–145)
SPECIMEN SOURCE: SIGNIFICANT CHANGE UP
WBC # BLD: 9.5 K/UL — SIGNIFICANT CHANGE UP (ref 3.8–10.5)
WBC # FLD AUTO: 9.5 K/UL — SIGNIFICANT CHANGE UP (ref 3.8–10.5)

## 2024-10-13 PROCEDURE — 99232 SBSQ HOSP IP/OBS MODERATE 35: CPT

## 2024-10-13 RX ADMIN — Medication 5000 UNIT(S): at 05:20

## 2024-10-13 RX ADMIN — Medication 17 GRAM(S): at 11:56

## 2024-10-13 RX ADMIN — CHLORHEXIDINE GLUCONATE ORAL RINSE 1 APPLICATION(S): 1.2 SOLUTION DENTAL at 11:58

## 2024-10-13 RX ADMIN — ERTAPENEM 100 MILLIGRAM(S): 1 INJECTION, POWDER, LYOPHILIZED, FOR SOLUTION INTRAMUSCULAR; INTRAVENOUS at 15:21

## 2024-10-13 RX ADMIN — Medication 50 MICROGRAM(S): at 05:20

## 2024-10-13 RX ADMIN — PANTOPRAZOLE SODIUM 40 MILLIGRAM(S): 40 TABLET, DELAYED RELEASE ORAL at 05:20

## 2024-10-13 RX ADMIN — Medication 400 MILLIGRAM(S): at 11:53

## 2024-10-13 RX ADMIN — MULTI VITAMIN/MINERAL SUPPLEMENT WITH ASCORBIC ACID, NIACIN, PYRIDOXINE, PANTOTHENIC ACID, FOLIC ACID, RIBOFLAVIN, THIAMIN, BIOTIN, COBALAMIN AND ZINC. 1 TABLET(S): 60; 20; 12.5; 10; 10; 1.7; 1.5; 1; .3; .006 TABLET, COATED ORAL at 11:54

## 2024-10-13 RX ADMIN — Medication 5000 UNIT(S): at 19:32

## 2024-10-13 RX ADMIN — Medication 5000 UNIT(S): at 11:55

## 2024-10-13 RX ADMIN — Medication 2.5 MILLIGRAM(S): at 05:21

## 2024-10-13 RX ADMIN — Medication 2000 UNIT(S): at 11:53

## 2024-10-13 NOTE — DISCHARGE NOTE PROVIDER - ATTENDING DISCHARGE PHYSICAL EXAMINATION:
CONSTITUTIONAL: NAD, sitting in bed comfortably   NECK: Supple  RESP: No respiratory distress, no use of accessory muscles  CV: RRR  GI: ND, no rebound, no guarding  NEURO: No focal deficits   PSYCH: Appropriate insight/judgment

## 2024-10-13 NOTE — DISCHARGE NOTE PROVIDER - CARE PROVIDER_API CALL
Agusto Waddell  Nephrology  [address] [address] [place] [address], [address]  [place] [address]  Phone: [phone]  Fax: [phone]  Follow Up Time:

## 2024-10-13 NOTE — PROGRESS NOTE ADULT - ASSESSMENT
69-year-old female comes into the ER with Symptoms since yesterday of fevers up to 102, cough general malaise weakness rigors.  nephrology consulted for acute on ckd and electrolyte anonymities    Acute on Chronic Kidney Disease stage 3  Baseline ~ 1.5  MICHAEL possible prerenal and improving.  FeNa<1%  Renal US unremarkable.  ARB on hold  - monitor BMP and Urine Output.  - avoid nephrotoxic agents    Hyponatremia  Associated with Hypovolemia.  improved  - avoid overcorrection, Na should not correct >8meq in 24 hrs    Fever  Possibly d/t UTI.  Workup and management per team.    Metabolic Acidosis  non AG  improved s/p bicarb gtt now on LR   monitor    hypocalcemia  Repeat  - high for CKD stage.  Likely in setting of hypoalbuminemia vs Vit. D insufficiency - Vit. D 25OH 25.4.  Start Vit D3 2000units qd.  Optimize albumin.  monitor    proteinuria  check urine p/c ratio  known Arthritis and Sjogren syndrome follows rheum  being followed in office     hypotensive  Bp stable  Monitor     Proteinuria/Hematuria  Possibly in setting of UTI vs. DM.  UPr/Cr 1.1gm.  Repeat UA after completing antibiotics.  Workup outpatient.  ARB on hold.  Monitor.

## 2024-10-13 NOTE — DISCHARGE NOTE PROVIDER - NSDCMRMEDTOKEN_GEN_ALL_CORE_FT
Atarax 10 mg oral tablet: 2 tab(s) orally once a day (at bedtime) as needed for  allergy symptoms  levothyroxine 50 mcg (0.05 mg) oral tablet: 1 tab(s) orally once a day  losartan 25 mg oral tablet: 1 tab(s) orally once a day  magnesium oxide 400 mg oral tablet: 1 tab(s) orally once a day  Polyethylene Glycol 3350: 17 gram(s) orally once a day  prazosin 1 mg oral capsule: 1 cap(s) orally once a day (at bedtime)  prednisoLONE 5 mg oral tablet: 0.5 tab(s) orally once a day  vitamin b complex:   zinc (as acetate) 25 mg oral capsule: 1 cap(s) orally once a day   Atarax 10 mg oral tablet: 2 tab(s) orally once a day (at bedtime) as needed for  allergy symptoms  cholecalciferol oral tablet: 1 tab(s) orally once a day 2000 unit(s) orally once a day  famotidine 20 mg oral tablet: 1 tab(s) orally once a day  levothyroxine 50 mcg (0.05 mg) oral tablet: 1 tab(s) orally once a day  magnesium oxide 400 mg oral tablet: 1 tab(s) orally once a day  Nephro-Gabriel oral tablet: 1 tab(s) orally once a day  Polyethylene Glycol 3350: 17 gram(s) orally once a day  prednisoLONE 5 mg oral tablet: 0.5 tab(s) orally once a day  vitamin b complex:   zinc (as acetate) 25 mg oral capsule: 1 cap(s) orally once a day

## 2024-10-13 NOTE — DISCHARGE NOTE PROVIDER - NSDCCPCAREPLAN_GEN_ALL_CORE_FT
PRINCIPAL DISCHARGE DIAGNOSIS  Diagnosis: Acute UTI  Assessment and Plan of Treatment: You were found to have sepsis due to a UTI which resulted in bacteria in the blood. You were treated with IV antibiotics      SECONDARY DISCHARGE DIAGNOSES  Diagnosis: Acute kidney injury superimposed on CKD  Assessment and Plan of Treatment: Kidney injury noted in setting of sepsis. Renal function improved. No further treatment needed     PRINCIPAL DISCHARGE DIAGNOSIS  Diagnosis: Acute UTI  Assessment and Plan of Treatment: Completed antibiotics on 10/16      SECONDARY DISCHARGE DIAGNOSES  Diagnosis: Acute kidney injury superimposed on CKD  Assessment and Plan of Treatment: Follow up with Renal doctor in 1-2 weeks .Call for an appointment       Diagnosis: Benign essential HTN  Assessment and Plan of Treatment: Hold Losartan and Prazosin Bp stable  Follow with PCP in 1-2 weeks. For BP check and medication management

## 2024-10-13 NOTE — DISCHARGE NOTE PROVIDER - DISCHARGE DIET
Regular Diet - No restrictions/DASH Diet/Low Sodium Diet/Consistent Carbohydrate Diabetic Diets/Easy to Chew Diet

## 2024-10-13 NOTE — PROGRESS NOTE ADULT - ASSESSMENT
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 On admission she was febrile with high leukocytosis. Found to have high grade E. Coli bacteremia on 10/4 with urine cultures also with E. Coli. UA was grossly positive. She was started on meropenem with improvement in clinical status; however repeat blood cultures obtained on 10/6 are now also growing E. coli in 1/4 bottles. Her Fever and leukocytosis have resolved. Severe MICHAEL on admission which is improved but still elevated. Renal KUB was normal on 10/5. CXR clear. Repeat blood cultures from 10/11 are pending  Charted allergy to cefixime and ciprofloxacin - patient is unable to recall which antibiotics to which she was allergic. Infectious diseases consultation requested today for further management.     # E. Coli bacteremia from urinary source   # Complicated UTI  # Leucocytosis-- now resolved  # Acute kidney injury superimposed on CKD.   # H/O migratory polyarthritis on steroids    # Allergy history to cefixime and ciprofloxacin     MICRO:   10/04 BCX: 4/4 E.coli ( R- bactrim)   10/04 UCX: E.coli   10/06 BCX: 1/4 E.coli   10/11 BCX: NGTD    Antibiotics:   Meropenem 10/04--10/12  Ertapenem 10/12--current     Repeat cultures from 10/06 with 1/4 bottles of E. Coli, however was initially high grade on 10/04 and was only given one day of antibiotics. Suspect she needed more treatment prior to re-evaluation. She is otherwise clinically improving. WBC has now normalized. She remains afebrile. Abdominal examination benign.     RECOMMENDATIONS:   - Continue ertapenem 500 mg q24h (renally-dosed) Crcl <30   - follow-up blood cultures from 10/11 -- NGTD (follow until negative for 48-72 hrs)   - monitor clinical status - temperature, WBC count, symptoms, etc.  - Would plan for a 10 day course with IV ertapenem from 10/07 until 10/16. Unable to give PO step down with Cipro/Levo as patient is allergic and E. coli resistant to bactrim,       Discussed with the primary team.  ID will sign off today. Thank you for the consultation. Please feel free to reach out with any questions or concerns.       Carlota Alicia MD  Attending Physician, Division of Infectious Diseases  Department of Medicine   [facility]    Contact on TEAMS messaging from 9am - 5pm  Office: [phone] (after 5 PM or weekend) .

## 2024-10-13 NOTE — PROGRESS NOTE ADULT - ASSESSMENT
69 yr old (English speaking) female presenting with hx of HTN, CKD a/w sepsis w/ EColi bacteremia likely i/s/o UTI, Also w/ MICHAEL on CKD

## 2024-10-13 NOTE — PROGRESS NOTE ADULT - PROBLEM SELECTOR PLAN 1
Met sepsis criteria on admission w/ fever and leukocytosis   Source likely urine  - complicated by E coli bacteremia  - Repeat BCx from 10/6 came back positive on 10/11 in 1/4 bottles. f/u repeat BCx  - Per pharmacy, no oral option for dc based on pt allergies   - ID consulted  --Meropenem switched to Ertapenem per ID recs

## 2024-10-13 NOTE — DISCHARGE NOTE PROVIDER - HOSPITAL COURSE
69 yr old (Welsh speaking) female presenting with hx of HTN, CKD a/w sepsis w/ EColi bacteremia likely i/s/o UTI, Also w/ MICHAEL on CKD        Nutritional Assessment:  · Nutritional Assessment	This patient has been assessed with a concern for Malnutrition and has been determined to have a diagnosis/diagnoses of Moderate protein-calorie malnutrition.    This patient is being managed with:   Diet Regular-  Consistent Carbohydrate {Evening Snack} (CSTCHOSN)  DASH/TLC {Sodium & Cholesterol Restricted} (DASH)  Easy to Chew (EASYTOCHEW)  Supplement Feeding Modality:  Oral  Glucerna Shake Cans or Servings Per Day:  1       Frequency:  Daily  Entered: Oct 11 2024  3:46PM      ·  Problem: Sepsis.   ·  Plan: Met sepsis criteria on admission w/ fever and leukocytosis   Source likely urine  - complicated by E coli bacteremia  - Repeat BCx from 10/6 came back positive on 10/11 in 1/4 bottles. Repeat blood cultures were negative  - ID consulted  --Meropenem switched to Ertapenem per ID recommendations        ·  Problem: Acute kidney injury superimposed on CKD.   ·  Plan: CR 3.37 on presentation with reported Cr ~1.5   Likely secondary to ATN  Renal US unremarkable   Acute kidney injury resolved      Problem/Plan - 4:  ·  Problem: Electrolyte imbalance.   ·  Plan: #Hyponatremia  --Likely due to dehydration  - Now resolved     #Hypocalcemia  -- repleted      ·  Problem: Benign essential HTN.   ·  Plan: Chronic unstable as hypotensive on presentation   Held home losartan and prazosin given hypotension and MICHAEL      ·  Problem: H/O migratory polyarthritis.   ·  Plan: Chronic stable  Pt on prednisolone ~2mg daily - she cuts a 5mg tablet in half and "takes a little less"- she is self reducing her dose  Patient initially started on stress dosed steroids  Steroids were weaned to her home dose.         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 Also endorsing intermittent SOB  Denies  headache, dizziness, chest pain, palpitations,  abdominal pain, joint pain, diarrhea/constipation.  Vitals: T 1015. , BP 77/51-> 92/48,  satting 95% RA (04 Oct 2024 20:04)    Hospital Course:  You initially presented with severe infection causing a systemic reaction (called sepsis). The infectious disease specialists were assisting. You were on antibiotics for the E coli in your blood--which was thought to be from a urinary source. Your subsequent blood cultures were negative and you were treated with Meropenem and then Ertapenem. Your kidney was affected by this, but as your infection improved and with IV fluids, your kidney did better as well. Your Sodium and Calcium was closely monitored. Sodium is related to how much water your body has, so initially was thought that you did not have ENOUGH water in your body. As we replaced some water in your body, your sodium improved. Your blood pressure was low when you came in, so we never re-started your home anti-high blood pressure medications. You were continued on your home steroid dosing. You do not have diabetes based on our A1c testing, which was 5.3% (diabetes is when your level is greater than 6.5%).     On day of discharge, had discussion with Nephrology who state you are stable for discharge.     Important Medication Changes and Reason:  Please continue to hold (do not take) Losartan and Prazosin, as these could be affecting your kidney function     Active or Pending Issues Requiring Follow-up:  Please follow up PCP  Please follow up Nephrology     Advanced Directives:   [x] Full code  [ ] DNR  [ ] Hospice    Discharge Diagnoses:    Sepsis 2/2 E coli UTI and E coli bacteremia   E Coli bacteremia 2/2 E coli UTI   E coli UTI   Hyponatremia, suspected hypovolemic   Hypocalcemia  Benign essential HTN  Migratory polyarthritis

## 2024-10-14 DIAGNOSIS — Z29.9 ENCOUNTER FOR PROPHYLACTIC MEASURES, UNSPECIFIED: ICD-10-CM

## 2024-10-14 LAB
ANION GAP SERPL CALC-SCNC: 12 MMOL/L — SIGNIFICANT CHANGE UP (ref 7–14)
BUN SERPL-MCNC: 52 MG/DL — HIGH (ref 7–23)
CALCIUM SERPL-MCNC: 8.7 MG/DL — SIGNIFICANT CHANGE UP (ref 8.4–10.5)
CHLORIDE SERPL-SCNC: 102 MMOL/L — SIGNIFICANT CHANGE UP (ref 98–107)
CO2 SERPL-SCNC: 20 MMOL/L — LOW (ref 22–31)
CREAT SERPL-MCNC: 1.97 MG/DL — HIGH (ref 0.5–1.3)
EGFR: 27 ML/MIN/1.73M2 — LOW
GLUCOSE BLDC GLUCOMTR-MCNC: 112 MG/DL — HIGH (ref 70–99)
GLUCOSE BLDC GLUCOMTR-MCNC: 119 MG/DL — HIGH (ref 70–99)
GLUCOSE BLDC GLUCOMTR-MCNC: 91 MG/DL — SIGNIFICANT CHANGE UP (ref 70–99)
GLUCOSE SERPL-MCNC: 91 MG/DL — SIGNIFICANT CHANGE UP (ref 70–99)
HCT VFR BLD CALC: 32.3 % — LOW (ref 34.5–45)
HGB BLD-MCNC: 10.6 G/DL — LOW (ref 11.5–15.5)
MAGNESIUM SERPL-MCNC: 2 MG/DL — SIGNIFICANT CHANGE UP (ref 1.6–2.6)
MCHC RBC-ENTMCNC: 29.2 PG — SIGNIFICANT CHANGE UP (ref 27–34)
MCHC RBC-ENTMCNC: 32.8 GM/DL — SIGNIFICANT CHANGE UP (ref 32–36)
MCV RBC AUTO: 89 FL — SIGNIFICANT CHANGE UP (ref 80–100)
NRBC # BLD: 0 /100 WBCS — SIGNIFICANT CHANGE UP (ref 0–0)
NRBC # FLD: 0 K/UL — SIGNIFICANT CHANGE UP (ref 0–0)
PHOSPHATE SERPL-MCNC: 4.2 MG/DL — SIGNIFICANT CHANGE UP (ref 2.5–4.5)
PLATELET # BLD AUTO: 288 K/UL — SIGNIFICANT CHANGE UP (ref 150–400)
POTASSIUM SERPL-MCNC: 4.5 MMOL/L — SIGNIFICANT CHANGE UP (ref 3.5–5.3)
POTASSIUM SERPL-SCNC: 4.5 MMOL/L — SIGNIFICANT CHANGE UP (ref 3.5–5.3)
RBC # BLD: 3.63 M/UL — LOW (ref 3.8–5.2)
RBC # FLD: 15.6 % — HIGH (ref 10.3–14.5)
SODIUM SERPL-SCNC: 134 MMOL/L — LOW (ref 135–145)
WBC # BLD: 8.16 K/UL — SIGNIFICANT CHANGE UP (ref 3.8–10.5)
WBC # FLD AUTO: 8.16 K/UL — SIGNIFICANT CHANGE UP (ref 3.8–10.5)

## 2024-10-14 PROCEDURE — 99232 SBSQ HOSP IP/OBS MODERATE 35: CPT

## 2024-10-14 RX ADMIN — CHLORHEXIDINE GLUCONATE ORAL RINSE 1 APPLICATION(S): 1.2 SOLUTION DENTAL at 15:42

## 2024-10-14 RX ADMIN — Medication 50 MICROGRAM(S): at 05:01

## 2024-10-14 RX ADMIN — MULTI VITAMIN/MINERAL SUPPLEMENT WITH ASCORBIC ACID, NIACIN, PYRIDOXINE, PANTOTHENIC ACID, FOLIC ACID, RIBOFLAVIN, THIAMIN, BIOTIN, COBALAMIN AND ZINC. 1 TABLET(S): 60; 20; 12.5; 10; 10; 1.7; 1.5; 1; .3; .006 TABLET, COATED ORAL at 15:41

## 2024-10-14 RX ADMIN — Medication 17 GRAM(S): at 15:41

## 2024-10-14 RX ADMIN — Medication 400 MILLIGRAM(S): at 15:41

## 2024-10-14 RX ADMIN — Medication 5000 UNIT(S): at 05:00

## 2024-10-14 RX ADMIN — Medication 3 MILLIGRAM(S): at 21:50

## 2024-10-14 RX ADMIN — ERTAPENEM 100 MILLIGRAM(S): 1 INJECTION, POWDER, LYOPHILIZED, FOR SOLUTION INTRAMUSCULAR; INTRAVENOUS at 15:42

## 2024-10-14 RX ADMIN — Medication 5000 UNIT(S): at 21:50

## 2024-10-14 RX ADMIN — Medication 2000 UNIT(S): at 15:41

## 2024-10-14 RX ADMIN — Medication 5000 UNIT(S): at 12:59

## 2024-10-14 RX ADMIN — Medication 2.5 MILLIGRAM(S): at 05:01

## 2024-10-14 RX ADMIN — PANTOPRAZOLE SODIUM 40 MILLIGRAM(S): 40 TABLET, DELAYED RELEASE ORAL at 05:01

## 2024-10-14 NOTE — PROGRESS NOTE ADULT - ASSESSMENT
69-year-old female comes into the ER with Symptoms since yesterday of fevers up to 102, cough general malaise weakness rigors.  nephrology consulted for acute on ckd and electrolyte anonymities    Acute on Chronic Kidney Disease stage 3  Baseline ~ 1.5  MICHAEL possible prerenal now stable 1.7-1.9  FeNa<1%  Renal US unremarkable.  ARB on hold  monitor BMP and Urine Output.  avoid nephrotoxic agents    Hyponatremia  Associated with Hypovolemia.  improved  avoid overcorrection, Na should not correct >8meq in 24 hrs    Fever  Possibly d/t UTI.  Workup and management per team.    Metabolic Acidosis  non AG  improved s/p bicarb gtt now on LR   monitor    hypocalcemia  Repeat  - high for CKD stage.  Likely in setting of hypoalbuminemia vs Vit. D insufficiency - Vit. D 25OH 25.4.  cw Vit D3 2000units qd.  Optimize albumin.  monitor    proteinuria  UPCR 1 gram related to UTI and or DM likely  known Arthritis and Sjogren syndrome follows rheum  being followed in office  When renal function stabilizes will benefit from ACE/ARB/SGLT2     hypotensive  Bp stable  Monitor

## 2024-10-14 NOTE — PROGRESS NOTE ADULT - ASSESSMENT
69F (Croatian speaking) PMHx HTN, CKD a/w sepsis w/ EColi bacteremia likely i/s/o UTI, also w/ MICHAEL on CKD

## 2024-10-14 NOTE — PROGRESS NOTE ADULT - PROBLEM SELECTOR PLAN 1
Met sepsis criteria on admission w/ fever and leukocytosis   Source likely urine  - ID following  - complicated by E coli bacteremia  - Repeat BCx from 10/6 came back positive on 10/11 in 1/4 bottles  - Repeat BCx from 10/11 negative for 48 hours   - Per pharmacy, no oral option for dc based on pt allergies   - Per ID: Meropenem switched to Ertapenem, end date 10/16

## 2024-10-15 LAB
ANION GAP SERPL CALC-SCNC: 15 MMOL/L — HIGH (ref 7–14)
BUN SERPL-MCNC: 62 MG/DL — HIGH (ref 7–23)
CALCIUM SERPL-MCNC: 8.8 MG/DL — SIGNIFICANT CHANGE UP (ref 8.4–10.5)
CHLORIDE SERPL-SCNC: 103 MMOL/L — SIGNIFICANT CHANGE UP (ref 98–107)
CO2 SERPL-SCNC: 18 MMOL/L — LOW (ref 22–31)
CREAT SERPL-MCNC: 2.06 MG/DL — HIGH (ref 0.5–1.3)
EGFR: 26 ML/MIN/1.73M2 — LOW
GLUCOSE BLDC GLUCOMTR-MCNC: 101 MG/DL — HIGH (ref 70–99)
GLUCOSE BLDC GLUCOMTR-MCNC: 112 MG/DL — HIGH (ref 70–99)
GLUCOSE BLDC GLUCOMTR-MCNC: 90 MG/DL — SIGNIFICANT CHANGE UP (ref 70–99)
GLUCOSE BLDC GLUCOMTR-MCNC: 96 MG/DL — SIGNIFICANT CHANGE UP (ref 70–99)
GLUCOSE SERPL-MCNC: 79 MG/DL — SIGNIFICANT CHANGE UP (ref 70–99)
HCT VFR BLD CALC: 31.6 % — LOW (ref 34.5–45)
HGB BLD-MCNC: 10.7 G/DL — LOW (ref 11.5–15.5)
MAGNESIUM SERPL-MCNC: 2.2 MG/DL — SIGNIFICANT CHANGE UP (ref 1.6–2.6)
MCHC RBC-ENTMCNC: 29.5 PG — SIGNIFICANT CHANGE UP (ref 27–34)
MCHC RBC-ENTMCNC: 33.9 GM/DL — SIGNIFICANT CHANGE UP (ref 32–36)
MCV RBC AUTO: 87.1 FL — SIGNIFICANT CHANGE UP (ref 80–100)
NRBC # BLD: 0 /100 WBCS — SIGNIFICANT CHANGE UP (ref 0–0)
NRBC # FLD: 0 K/UL — SIGNIFICANT CHANGE UP (ref 0–0)
PHOSPHATE SERPL-MCNC: 5.2 MG/DL — HIGH (ref 2.5–4.5)
PLATELET # BLD AUTO: 292 K/UL — SIGNIFICANT CHANGE UP (ref 150–400)
POTASSIUM SERPL-MCNC: 4.3 MMOL/L — SIGNIFICANT CHANGE UP (ref 3.5–5.3)
POTASSIUM SERPL-SCNC: 4.3 MMOL/L — SIGNIFICANT CHANGE UP (ref 3.5–5.3)
RBC # BLD: 3.63 M/UL — LOW (ref 3.8–5.2)
RBC # FLD: 15.3 % — HIGH (ref 10.3–14.5)
SODIUM SERPL-SCNC: 136 MMOL/L — SIGNIFICANT CHANGE UP (ref 135–145)
WBC # BLD: 7.24 K/UL — SIGNIFICANT CHANGE UP (ref 3.8–10.5)
WBC # FLD AUTO: 7.24 K/UL — SIGNIFICANT CHANGE UP (ref 3.8–10.5)

## 2024-10-15 PROCEDURE — 99232 SBSQ HOSP IP/OBS MODERATE 35: CPT

## 2024-10-15 RX ORDER — SODIUM CHLORIDE IRRIG SOLUTION 0.9 %
1000 SOLUTION, IRRIGATION IRRIGATION
Refills: 0 | Status: DISCONTINUED | OUTPATIENT
Start: 2024-10-15 | End: 2024-10-17

## 2024-10-15 RX ORDER — SODIUM CHLORIDE IRRIG SOLUTION 0.9 %
1000 SOLUTION, IRRIGATION IRRIGATION
Refills: 0 | Status: DISCONTINUED | OUTPATIENT
Start: 2024-10-15 | End: 2024-10-15

## 2024-10-15 RX ADMIN — Medication 5000 UNIT(S): at 06:40

## 2024-10-15 RX ADMIN — PANTOPRAZOLE SODIUM 40 MILLIGRAM(S): 40 TABLET, DELAYED RELEASE ORAL at 06:40

## 2024-10-15 RX ADMIN — Medication 2.5 MILLIGRAM(S): at 06:46

## 2024-10-15 RX ADMIN — Medication 75 MILLILITER(S): at 21:36

## 2024-10-15 RX ADMIN — CHLORHEXIDINE GLUCONATE ORAL RINSE 1 APPLICATION(S): 1.2 SOLUTION DENTAL at 13:03

## 2024-10-15 RX ADMIN — ERTAPENEM 100 MILLIGRAM(S): 1 INJECTION, POWDER, LYOPHILIZED, FOR SOLUTION INTRAMUSCULAR; INTRAVENOUS at 16:55

## 2024-10-15 RX ADMIN — Medication 2000 UNIT(S): at 12:54

## 2024-10-15 RX ADMIN — Medication 75 MILLILITER(S): at 14:39

## 2024-10-15 RX ADMIN — Medication 50 MICROGRAM(S): at 06:40

## 2024-10-15 RX ADMIN — MULTI VITAMIN/MINERAL SUPPLEMENT WITH ASCORBIC ACID, NIACIN, PYRIDOXINE, PANTOTHENIC ACID, FOLIC ACID, RIBOFLAVIN, THIAMIN, BIOTIN, COBALAMIN AND ZINC. 1 TABLET(S): 60; 20; 12.5; 10; 10; 1.7; 1.5; 1; .3; .006 TABLET, COATED ORAL at 12:54

## 2024-10-15 RX ADMIN — Medication 5000 UNIT(S): at 19:49

## 2024-10-15 RX ADMIN — Medication 17 GRAM(S): at 12:54

## 2024-10-15 RX ADMIN — Medication 5000 UNIT(S): at 12:53

## 2024-10-15 RX ADMIN — Medication 75 MILLILITER(S): at 19:48

## 2024-10-15 RX ADMIN — Medication 400 MILLIGRAM(S): at 13:03

## 2024-10-15 NOTE — PROGRESS NOTE ADULT - PROBLEM SELECTOR PLAN 1
Met sepsis criteria on admission w/ fever and leukocytosis   Source likely urine  - ID following  - complicated by E coli bacteremia  - Repeat BCx from 10/6 came back positive on 10/11 in 1/4 bottles  - Repeat BCx from 10/11 go growth to date    - Per pharmacy, no oral option for dc based on pt allergies   - Per ID: Meropenem switched to Ertapenem, end date 10/16

## 2024-10-15 NOTE — PROGRESS NOTE ADULT - ASSESSMENT
69-year-old female comes into the ER with Symptoms since yesterday of fevers up to 102, cough general malaise weakness rigors.  nephrology consulted for acute on ckd and electrolyte anonymities    Acute on Chronic Kidney Disease stage 3  Baseline ~ 1.5  Worsening renal function today. Will recommend restart fluids 1/2 NS with 75 meq sodium bicarb at 75 cc/hour  FeNa<1%, Will resend urine studies today  Renal US unremarkable.  ARB on hold  monitor BMP and Urine Output.  avoid nephrotoxic agents    Hyponatremia  Associated with Hypovolemia.  improved  avoid overcorrection, Na should not correct >8meq in 24 hrs    Fever  Possibly d/t UTI.  Workup and management per team.    Metabolic Acidosis  non AG  improved s/p bicarb gtt now on LR   monitor    hypocalcemia  Repeat  - high for CKD stage.  Likely in setting of hypoalbuminemia vs Vit. D insufficiency - Vit. D 25OH 25.4.  cw Vit D3 2000units qd.  Optimize albumin.  monitor    proteinuria  UPCR 1 gram related to UTI and or DM likely  known Arthritis and Sjogren syndrome follows rheum  being followed in office  When renal function stabilizes will benefit from ACE/ARB/SGLT2     hypotensive  Bp stable  Monitor

## 2024-10-15 NOTE — PROGRESS NOTE ADULT - ASSESSMENT
69F (Persian speaking) PMHx HTN, CKD a/w sepsis w/ EColi bacteremia likely i/s/o UTI, also w/ MICHAEL on CKD

## 2024-10-16 LAB
ANION GAP SERPL CALC-SCNC: 16 MMOL/L — HIGH (ref 7–14)
BUN SERPL-MCNC: 75 MG/DL — HIGH (ref 7–23)
CALCIUM SERPL-MCNC: 8.9 MG/DL — SIGNIFICANT CHANGE UP (ref 8.4–10.5)
CHLORIDE SERPL-SCNC: 99 MMOL/L — SIGNIFICANT CHANGE UP (ref 98–107)
CO2 SERPL-SCNC: 19 MMOL/L — LOW (ref 22–31)
CREAT ?TM UR-MCNC: 21 MG/DL — SIGNIFICANT CHANGE UP
CREAT SERPL-MCNC: 2.27 MG/DL — HIGH (ref 0.5–1.3)
CULTURE RESULTS: SIGNIFICANT CHANGE UP
CULTURE RESULTS: SIGNIFICANT CHANGE UP
EGFR: 23 ML/MIN/1.73M2 — LOW
GLUCOSE BLDC GLUCOMTR-MCNC: 100 MG/DL — HIGH (ref 70–99)
GLUCOSE BLDC GLUCOMTR-MCNC: 107 MG/DL — HIGH (ref 70–99)
GLUCOSE BLDC GLUCOMTR-MCNC: 127 MG/DL — HIGH (ref 70–99)
GLUCOSE BLDC GLUCOMTR-MCNC: 75 MG/DL — SIGNIFICANT CHANGE UP (ref 70–99)
GLUCOSE SERPL-MCNC: 75 MG/DL — SIGNIFICANT CHANGE UP (ref 70–99)
HCT VFR BLD CALC: 30.9 % — LOW (ref 34.5–45)
HGB BLD-MCNC: 10.5 G/DL — LOW (ref 11.5–15.5)
MAGNESIUM SERPL-MCNC: 2.2 MG/DL — SIGNIFICANT CHANGE UP (ref 1.6–2.6)
MCHC RBC-ENTMCNC: 29.7 PG — SIGNIFICANT CHANGE UP (ref 27–34)
MCHC RBC-ENTMCNC: 34 GM/DL — SIGNIFICANT CHANGE UP (ref 32–36)
MCV RBC AUTO: 87.5 FL — SIGNIFICANT CHANGE UP (ref 80–100)
NRBC # BLD: 0 /100 WBCS — SIGNIFICANT CHANGE UP (ref 0–0)
NRBC # FLD: 0 K/UL — SIGNIFICANT CHANGE UP (ref 0–0)
PHOSPHATE SERPL-MCNC: 5.3 MG/DL — HIGH (ref 2.5–4.5)
PLATELET # BLD AUTO: 317 K/UL — SIGNIFICANT CHANGE UP (ref 150–400)
POTASSIUM SERPL-MCNC: 4.5 MMOL/L — SIGNIFICANT CHANGE UP (ref 3.5–5.3)
POTASSIUM SERPL-SCNC: 4.5 MMOL/L — SIGNIFICANT CHANGE UP (ref 3.5–5.3)
RBC # BLD: 3.53 M/UL — LOW (ref 3.8–5.2)
RBC # FLD: 15.3 % — HIGH (ref 10.3–14.5)
SODIUM SERPL-SCNC: 134 MMOL/L — LOW (ref 135–145)
SODIUM UR-SCNC: 77 MMOL/L — SIGNIFICANT CHANGE UP
SPECIMEN SOURCE: SIGNIFICANT CHANGE UP
SPECIMEN SOURCE: SIGNIFICANT CHANGE UP
WBC # BLD: 8.04 K/UL — SIGNIFICANT CHANGE UP (ref 3.8–10.5)
WBC # FLD AUTO: 8.04 K/UL — SIGNIFICANT CHANGE UP (ref 3.8–10.5)

## 2024-10-16 PROCEDURE — 99232 SBSQ HOSP IP/OBS MODERATE 35: CPT

## 2024-10-16 RX ORDER — FAMOTIDINE 40 MG
20 TABLET ORAL DAILY
Refills: 0 | Status: DISCONTINUED | OUTPATIENT
Start: 2024-10-16 | End: 2024-10-17

## 2024-10-16 RX ADMIN — Medication 17 GRAM(S): at 13:59

## 2024-10-16 RX ADMIN — MULTI VITAMIN/MINERAL SUPPLEMENT WITH ASCORBIC ACID, NIACIN, PYRIDOXINE, PANTOTHENIC ACID, FOLIC ACID, RIBOFLAVIN, THIAMIN, BIOTIN, COBALAMIN AND ZINC. 1 TABLET(S): 60; 20; 12.5; 10; 10; 1.7; 1.5; 1; .3; .006 TABLET, COATED ORAL at 13:58

## 2024-10-16 RX ADMIN — Medication 75 MILLILITER(S): at 17:40

## 2024-10-16 RX ADMIN — ERTAPENEM 100 MILLIGRAM(S): 1 INJECTION, POWDER, LYOPHILIZED, FOR SOLUTION INTRAMUSCULAR; INTRAVENOUS at 17:40

## 2024-10-16 RX ADMIN — CHLORHEXIDINE GLUCONATE ORAL RINSE 1 APPLICATION(S): 1.2 SOLUTION DENTAL at 13:59

## 2024-10-16 RX ADMIN — PANTOPRAZOLE SODIUM 40 MILLIGRAM(S): 40 TABLET, DELAYED RELEASE ORAL at 05:06

## 2024-10-16 RX ADMIN — Medication 5000 UNIT(S): at 21:43

## 2024-10-16 RX ADMIN — Medication 2.5 MILLIGRAM(S): at 05:06

## 2024-10-16 RX ADMIN — Medication 50 MICROGRAM(S): at 05:06

## 2024-10-16 RX ADMIN — Medication 5000 UNIT(S): at 14:06

## 2024-10-16 RX ADMIN — Medication 400 MILLIGRAM(S): at 13:58

## 2024-10-16 RX ADMIN — Medication 2000 UNIT(S): at 13:58

## 2024-10-16 RX ADMIN — Medication 5000 UNIT(S): at 05:05

## 2024-10-16 RX ADMIN — Medication 20 MILLIGRAM(S): at 14:05

## 2024-10-16 NOTE — PROGRESS NOTE ADULT - PROBLEM SELECTOR PLAN 7
Chronic moderate exacerbation     Not on diabetic medications at home   LDSS with diabetic diet  A1C 5.3
Not on diabetic medications at home   LDSS with diabetic diet  A1C 5.3
Chronic moderate exacerbation     Not on diabetic medications at home   LDSS with diabetic diet  A1C 5.3
Not on diabetic medications at home   LDSS with diabetic diet  A1C 5.3
Chronic moderate exacerbation     Not on diabetic medications at home   LDSS with diabetic diet  A1C 5.3

## 2024-10-16 NOTE — PROGRESS NOTE ADULT - ASSESSMENT
69-year-old female comes into the ER with Symptoms since yesterday of fevers up to 102, cough general malaise weakness rigors.  nephrology consulted for acute on ckd and electrolyte anonymities    Acute on Chronic Kidney Disease stage 3  Baseline ~ 1.5  Worsening renal function today. c/w 1/2 NS with 75 meq sodium bicarb at 75 cc/hour  FeNa<1%, repeat FeNa 5.6%   Renal US unremarkable.  ARB on hold  on ertapenem check CBC with diff   monitor BMP and Urine Output.  avoid nephrotoxic agents    Hyponatremia  Associated with Hypovolemia.  worsening today   avoid overcorrection, Na should not correct >8meq in 24 hrs    Fever  Possibly d/t UTI.  Workup and management per team.    Metabolic Acidosis  non AG + AG  improved s/p bicarb gtt now on LR   monitor    hypocalcemia  Repeat  - high for CKD stage.  Likely in setting of hypoalbuminemia vs Vit. D insufficiency - Vit. D 25OH 25.4.  cw Vit D3 2000units qd.  Optimize albumin.  monitor    proteinuria  UPCR 1 gram related to UTI and or DM likely  known Arthritis and Sjogren syndrome follows rheum  being followed in office  When renal function stabilizes will benefit from ACE/ARB/SGLT2     hypotensive  Bp stable  Monitor

## 2024-10-16 NOTE — PROGRESS NOTE ADULT - PROBLEM SELECTOR PROBLEM 2
Acute UTI
Sepsis

## 2024-10-16 NOTE — PROGRESS NOTE ADULT - ASSESSMENT
69F (Polish speaking) PMHx HTN, CKD a/w sepsis w/ EColi bacteremia likely i/s/o UTI, also w/ MICHAEL on CKD

## 2024-10-16 NOTE — PROGRESS NOTE ADULT - PROBLEM SELECTOR PROBLEM 4
Electrolyte imbalance

## 2024-10-16 NOTE — PROGRESS NOTE ADULT - PROBLEM SELECTOR PLAN 3
CR 3.37 on presentation with reported Cr ~1.5  Cr improving   Nephrology consulted: MICHAEL possible prerenal vs ATN.   Renal US unremarkable   renally dose medications and avoid nephrotoxic agents  F/u renal recs
CR 3.37 on presentation with reported Cr ~1.5  Cr improving   Nephrology consulted: MICHAEL possible prerenal vs ATN.   Renal US unremarkable   renally dose medications and avoid nephrotoxic agents  F/u renal recs
Nephrology following  Baseline Cr ~1.5  Possibly pre-renal   Cr improving   Renal US unremarkable   Renally dose medications and avoid nephrotoxic agents
CR 3.37 on presentation with reported Cr ~1.5  Cr improving   Likely secondary to ATN  Renal US unremarkable   renally dose medications and avoid nephrotoxic agents  F/u renal recs
Nephrology following  Baseline Cr ~1.5  Renal function worsening  - Per nephrology recommend started 1/2NS with 75meQ BiCarb at 75cc/hr  - Urine studies to be sent   Renal US unremarkable   Renally dose medications and avoid nephrotoxic agents
CR 3.37 on presentation with reported Cr ~1.5  Cr improving   Nephrology consulted: MICHAEL possible prerenal vs ATN.   Renal US unremarkable   renally dose medications and avoid nephrotoxic agents  F/u renal recs
CR 3.37 on presentation with reported Cr ~1.5  Nephrology consulted: MICHAEL possible prerenal vs ATN.   Renal US unremarkable   renally dose medications and avoid nephrotoxic agents  F/u renal recs
CR 3.37 on presentation with reported Cr ~1.5  Cr improving   Nephrology consulted: MICHAEL possible prerenal vs ATN.   Renal US unremarkable   renally dose medications and avoid nephrotoxic agents  F/u renal recs
CR 3.37 on presentation with reported Cr ~1.5  Nephrology consulted: MICHAEL possible prerenal vs ATN.   Renal US unremarkable   renally dose medications and avoid nephrotoxic agents  F/u renal recs
CR 3.37 on presentation with reported Cr ~1.5  Nephrology consulted: MICHAEL possible prerenal vs ATN.   Renal US unremarkable   renally dose medications and avoid nephrotoxic agents  F/u renal recs
CR 3.37 on presentation with reported Cr ~1.5  Cr improving   Nephrology consulted: MICHAEL possible prerenal vs ATN.   Renal US unremarkable   renally dose medications and avoid nephrotoxic agents  F/u renal recs
Treatment as above, to complete on 10/16

## 2024-10-16 NOTE — PROGRESS NOTE ADULT - PROBLEM SELECTOR PROBLEM 1
Sepsis
Acute kidney injury superimposed on CKD
Sepsis

## 2024-10-16 NOTE — PROGRESS NOTE ADULT - PROBLEM SELECTOR PLAN 6
Chronic stable  Pt on prednisolone ~2mg daily - she cuts a 5mg tablet in half and "takes a little less"- she is self reducing her dose  Stress dose steroids as above- will wean back to her home dose
Chronic stable  Pt on prednisolone ~2mg daily - she cuts a 5mg tablet in half and "takes a little less"- she is self reducing her dose  Stress dose steroids as above- will wean back to her home dose
Chronic stable  Pt on prednisolone ~2mg daily - she cuts a 5mg tablet in half and "takes a little less"- she is self reducing her dose  Steroids weaned to her home dose
Chronic stable  Pt on prednisolone ~2mg daily - she cuts a 5mg tablet in half and "takes a little less"- she is self reducing her dose  Stress dose steroids as above- will wean back to her home dose
Chronic stable  Pt on prednisolone ~2mg daily - she cuts a 5mg tablet in half and "takes a little less"- she is self reducing her dose  Stress dose steroids as above
Chronic stable  Pt on prednisolone ~2mg daily - she cuts a 5mg tablet in half and "takes a little less"- she is self reducing her dose  Stress dose steroids as above
Chronic stable  Pt on prednisolone ~2mg daily - she cuts a 5mg tablet in half and "takes a little less"- she is self reducing her dose  Stress dose steroids as above- will wean back to her home dose
Chronic stable  Pt on prednisolone ~2mg daily - she cuts a 5mg tablet in half and "takes a little less"- she is self reducing her dose  Steroids weaned to her home dose
needs annual flu vaccine
Chronic stable  Pt on prednisolone ~2mg daily - she cuts a 5mg tablet in half and "takes a little less"- she is self reducing her dose  Steroids weaned to her home dose
Chronic stable  Pt on prednisolone ~2mg daily - she cuts a 5mg tablet in half and "takes a little less"- she is self reducing her dose  Stress dose steroids as above
Chronic stable  Pt on prednisolone ~2mg daily - she cuts a 5mg tablet in half and "takes a little less"- she is self reducing her dose  Steroids  weaned to her home dose
Chronic stable  Pt on prednisolone ~2mg daily - she cuts a 5mg tablet in half and "takes a little less"- she is self reducing her dose  Steroids  weaned to her home dose

## 2024-10-16 NOTE — PROGRESS NOTE ADULT - PROBLEM SELECTOR PROBLEM 5
Benign essential HTN

## 2024-10-16 NOTE — PROGRESS NOTE ADULT - PROBLEM SELECTOR PLAN 9
DVT: HSQ  DIET: CC  DISPO: Home in next 48 to 72 hours
DVT: HSQ  DIET: CC  DISPO: Home in next 24 hours
DVT: HSQ  DIET: CC  DISPO: Home in next 24 to 48 hours

## 2024-10-16 NOTE — PROGRESS NOTE ADULT - NUTRITIONAL ASSESSMENT
This patient has been assessed with a concern for Malnutrition and has been determined to have a diagnosis/diagnoses of Moderate protein-calorie malnutrition.    This patient is being managed with:   Diet Regular-  Consistent Carbohydrate {Evening Snack} (CSTCHOSN)  DASH/TLC {Sodium & Cholesterol Restricted} (DASH)  Easy to Chew (EASYTOCHEW)  Supplement Feeding Modality:  Oral  Glucerna Shake Cans or Servings Per Day:  1       Frequency:  Daily  Entered: Oct 11 2024  3:46PM  

## 2024-10-16 NOTE — PROGRESS NOTE ADULT - PROBLEM SELECTOR PLAN 4
Nephrology following  #Hyponatremia  -Likely hypovolemia iso dehydration  -Monitor BMPs  -Improved     #Hypocalcemia  -associated with hypoalbuminemia and Vit D insufficiency   -continue Vit D3 2000u daily
#Hyponatremia  --Likely i/s/o dehydration  --Obtain urine osm and urine Na  --Monitor BMPs    #Hypocalcemia  --Obtain PTH/ Vit D
Nephrology following  #Hyponatremia  -Likely hypovolemia iso dehydration  -Monitor BMPs  -Improved     #Hypocalcemia  -associated with hypoalbuminemia and Vit D insufficiency   -continue Vit D3 2000u daily
#Hyponatremia  --Likely i/s/o dehydration  --Obtain urine osm and urine Na  --Monitor BMPs  - Now resolved     #Hypocalcemia  -- replete as needed
#Hyponatremia  --Likely i/s/o dehydration  --Monitor BMPs  - Now resolved     #Hypocalcemia  -- replete as needed
Nephrology following  #Hyponatremia  -Likely hypovolemia iso dehydration  -Monitor BMPs  -Improved     #Hypocalcemia  -associated with hypoalbuminemia and Vit D insufficiency   -initiated Vit D3 2000u daily

## 2024-10-16 NOTE — PROGRESS NOTE ADULT - PROBLEM SELECTOR PROBLEM 3
Acute kidney injury superimposed on CKD
Acute UTI
Acute kidney injury superimposed on CKD

## 2024-10-16 NOTE — PROGRESS NOTE ADULT - PROBLEM SELECTOR PLAN 2
New  Treatment as above
Treatment as above
New  Treatment as above
New  Treatment as above
Met sepsis criteria on admission w/ fever and leukocytosis   Source likely urine  - ID following  - complicated by E coli bacteremia  - Repeat BCx from 10/6 came back positive on 10/11 in 1/4 bottles  - Repeat BCx from 10/11 go growth to date    - Per pharmacy, no oral option for dc based on pt allergies   - Per ID: Meropenem switched to Ertapenem, end date 10/16

## 2024-10-16 NOTE — PROGRESS NOTE ADULT - PROBLEM SELECTOR PLAN 1
Nephrology following  Cr around ~2 this admission, reportedly baseline ~1.5  Renal function worsening  - Continued 1/2NS with 75meQ BiCarb at 75cc/hr  - Urine studies to be sent   - CBC w diff for AIN   Renal US unremarkable   Renally dose medications and avoid nephrotoxic agents Nephrology following  Cr around ~2 this admission, reportedly baseline ~1.5  Renal US unremarkable   Renal function worsening  - Continued 1/2NS with 75meQ BiCarb at 75cc/hr  - Urine studies to be sent   - CBC w diff for AIN   Renally dose medications and avoid nephrotoxic agents--PPI discontinued

## 2024-10-16 NOTE — PROGRESS NOTE ADULT - PROBLEM SELECTOR PLAN 8
Chronic stable  Not on a statin   Continue dietary/ lifestyle modifications   Outpatient follow up
Chronic stable  not on a statin   Continue dietary/ lifestyle modifications   Outpatient follow up      PPx  DVT: HSQ  DIET: CC  DISPO: Pending hospital course
Chronic stable  not on a statin   Continue dietary/ lifestyle modifications   Outpatient follow up      PPx  DVT: HSQ  DIET: CC  DISPO: Home
Chronic stable  Not on a statin   Continue dietary/ lifestyle modifications  Outpatient follow up
Chronic stable  Not on a statin   Continue dietary/ lifestyle modifications  Outpatient follow up
Chronic stable  not on a statin   Continue dietary/ lifestyle modifications   Outpatient follow up      PPx  DVT: HSQ  DIET: CC  DISPO: Pending hospital course
Chronic stable  not on a statin   Continue dietary/ lifestyle modifications   Outpatient follow up      PPx  DVT: HSQ  DIET: CC  DISPO: Pending hospital course
Chronic stable  not on a statin   Continue dietary/ lifestyle modifications   Outpatient follow up      PPx  DVT: HSQ  DIET: CC  DISPO: Home
Chronic stable  not on a statin   Continue dietary/ lifestyle modifications   Outpatient follow up      PPx  DVT: HSQ  DIET: CC  DISPO: Home
Chronic stable  not on a statin   Continue dietary/ lifestyle modifications   Outpatient follow up      PPx  DVT: HSQ  DIET: CC  DISPO: Pending hospital course
Chronic stable  not on a statin   Continue dietary/ lifestyle modifications   Outpatient follow up      PPx  DVT: HSQ  DIET: CC  DISPO: Pending hospital course
Chronic stable  not on a statin   Continue dietary/ lifestyle modifications   Outpatient follow up      PPx  DVT: HSQ  DIET: CC  DISPO: Home

## 2024-10-16 NOTE — PROGRESS NOTE ADULT - PROBLEM SELECTOR PROBLEM 7
T2DM (type 2 diabetes mellitus)

## 2024-10-16 NOTE — PROGRESS NOTE ADULT - PROBLEM SELECTOR PLAN 5
Chronic unstable as hypotensive on presentation   Holding home losartan and prazosin given hypotension   Monitor and restart medications as appropriate
Chronic unstable as hypotensive on presentation   Holding home losartan and prazosin given hypotension   Monitor and restart medications as appropriate
Chronic unstable as hypotensive on presentation   Holding home losartan and prazosin given hypotension  Monitor and restart medications as appropriate  BP remains stable off of anti-HTN meds
Chronic unstable as hypotensive on presentation   Holding home losartan and prazosin given hypotension  Monitor and restart medications as appropriate  BP remains stable off of anti-HTN meds
Chronic unstable as hypotensive on presentation   Holding home losartan and prazosin given hypotension   Monitor and restart medications as appropriate
Chronic unstable as hypotensive on presentation   Holding home losartan and prazosin given hypotension  Monitor and restart medications as appropriate  BP remains stable off of anti-HTN meds

## 2024-10-16 NOTE — PROGRESS NOTE ADULT - TIME BILLING
(including, but not limited, to)  - preparing to see the patient (eg, review of tests)   - obtaining and/or reviewing separately obtained history  - performing a medically appropriate examination and/or evaluation   - counseling and educating the patient/family/caregiver    - ordering medications, tests, or procedures   - referring and communicating with other health care professionals (when not separately reported)   - documenting clinical information in the electronic or other health record   - independently interpreting results (not separately reported) and communicating results to the patient/family/caregiver   - care coordination (not separately reported)
reviewing laboratory data, consultants' recommendations, documentation in [place], performing medically appropriate examinations/evaluations, discussion with patient/family/RN/ACP/Residents and interdisciplinary staff (such as , social workers, etc), counseling patient/family/care giver, ordering medically appropriate medication, tests, or procedures
reviewing laboratory data, consultants' recommendations, documentation in [place], performing medically appropriate examinations/evaluations, discussion with patient/family/RN/ACP/Residents and interdisciplinary staff (such as , social workers, etc), counseling patient/family/care giver, ordering medically appropriate medication, tests, or procedures
(including, but not limited, to)  - preparing to see the patient (eg, review of tests)   - obtaining and/or reviewing separately obtained history  - performing a medically appropriate examination and/or evaluation   - counseling and educating the patient/family/caregiver    - ordering medications, tests, or procedures   - referring and communicating with other health care professionals (when not separately reported)   - documenting clinical information in the electronic or other health record   - independently interpreting results (not separately reported) and communicating results to the patient/family/caregiver   - care coordination (not separately reported)
(including, but not limited, to)  - preparing to see the patient (eg, review of tests)   - obtaining and/or reviewing separately obtained history  - performing a medically appropriate examination and/or evaluation   - counseling and educating the patient/family/caregiver    - ordering medications, tests, or procedures   - referring and communicating with other health care professionals (when not separately reported)   - documenting clinical information in the electronic or other health record   - independently interpreting results (not separately reported) and communicating results to the patient/family/caregiver   - care coordination (not separately reported)

## 2024-10-16 NOTE — PROGRESS NOTE ADULT - PROBLEM SELECTOR PROBLEM 6
H/O migratory polyarthritis

## 2024-10-17 VITALS
TEMPERATURE: 97 F | RESPIRATION RATE: 17 BRPM | OXYGEN SATURATION: 99 % | SYSTOLIC BLOOD PRESSURE: 125 MMHG | HEART RATE: 78 BPM | DIASTOLIC BLOOD PRESSURE: 76 MMHG

## 2024-10-17 LAB
ANION GAP SERPL CALC-SCNC: 17 MMOL/L — HIGH (ref 7–14)
BASOPHILS # BLD AUTO: 0.07 K/UL — SIGNIFICANT CHANGE UP (ref 0–0.2)
BASOPHILS NFR BLD AUTO: 0.8 % — SIGNIFICANT CHANGE UP (ref 0–2)
BUN SERPL-MCNC: 68 MG/DL — HIGH (ref 7–23)
CALCIUM SERPL-MCNC: 9 MG/DL — SIGNIFICANT CHANGE UP (ref 8.4–10.5)
CHLORIDE SERPL-SCNC: 100 MMOL/L — SIGNIFICANT CHANGE UP (ref 98–107)
CO2 SERPL-SCNC: 21 MMOL/L — LOW (ref 22–31)
CREAT SERPL-MCNC: 2.2 MG/DL — HIGH (ref 0.5–1.3)
EGFR: 24 ML/MIN/1.73M2 — LOW
EOSINOPHIL # BLD AUTO: 0.42 K/UL — SIGNIFICANT CHANGE UP (ref 0–0.5)
EOSINOPHIL NFR BLD AUTO: 5 % — SIGNIFICANT CHANGE UP (ref 0–6)
GLUCOSE BLDC GLUCOMTR-MCNC: 105 MG/DL — HIGH (ref 70–99)
GLUCOSE BLDC GLUCOMTR-MCNC: 121 MG/DL — HIGH (ref 70–99)
GLUCOSE BLDC GLUCOMTR-MCNC: 97 MG/DL — SIGNIFICANT CHANGE UP (ref 70–99)
GLUCOSE SERPL-MCNC: 74 MG/DL — SIGNIFICANT CHANGE UP (ref 70–99)
HCT VFR BLD CALC: 32.9 % — LOW (ref 34.5–45)
HGB BLD-MCNC: 11 G/DL — LOW (ref 11.5–15.5)
IANC: 3.72 K/UL — SIGNIFICANT CHANGE UP (ref 1.8–7.4)
IMM GRANULOCYTES NFR BLD AUTO: 0.4 % — SIGNIFICANT CHANGE UP (ref 0–0.9)
LYMPHOCYTES # BLD AUTO: 3.17 K/UL — SIGNIFICANT CHANGE UP (ref 1–3.3)
LYMPHOCYTES # BLD AUTO: 37.9 % — SIGNIFICANT CHANGE UP (ref 13–44)
MAGNESIUM SERPL-MCNC: 2.4 MG/DL — SIGNIFICANT CHANGE UP (ref 1.6–2.6)
MCHC RBC-ENTMCNC: 29.6 PG — SIGNIFICANT CHANGE UP (ref 27–34)
MCHC RBC-ENTMCNC: 33.4 GM/DL — SIGNIFICANT CHANGE UP (ref 32–36)
MCV RBC AUTO: 88.7 FL — SIGNIFICANT CHANGE UP (ref 80–100)
MONOCYTES # BLD AUTO: 0.96 K/UL — HIGH (ref 0–0.9)
MONOCYTES NFR BLD AUTO: 11.5 % — SIGNIFICANT CHANGE UP (ref 2–14)
NEUTROPHILS # BLD AUTO: 3.72 K/UL — SIGNIFICANT CHANGE UP (ref 1.8–7.4)
NEUTROPHILS NFR BLD AUTO: 44.4 % — SIGNIFICANT CHANGE UP (ref 43–77)
NRBC # BLD: 0 /100 WBCS — SIGNIFICANT CHANGE UP (ref 0–0)
NRBC # FLD: 0 K/UL — SIGNIFICANT CHANGE UP (ref 0–0)
PHOSPHATE SERPL-MCNC: 5.5 MG/DL — HIGH (ref 2.5–4.5)
PLATELET # BLD AUTO: 340 K/UL — SIGNIFICANT CHANGE UP (ref 150–400)
POTASSIUM SERPL-MCNC: 4.7 MMOL/L — SIGNIFICANT CHANGE UP (ref 3.5–5.3)
POTASSIUM SERPL-SCNC: 4.7 MMOL/L — SIGNIFICANT CHANGE UP (ref 3.5–5.3)
RBC # BLD: 3.71 M/UL — LOW (ref 3.8–5.2)
RBC # FLD: 15.2 % — HIGH (ref 10.3–14.5)
SODIUM SERPL-SCNC: 138 MMOL/L — SIGNIFICANT CHANGE UP (ref 135–145)
WBC # BLD: 8.37 K/UL — SIGNIFICANT CHANGE UP (ref 3.8–10.5)
WBC # FLD AUTO: 8.37 K/UL — SIGNIFICANT CHANGE UP (ref 3.8–10.5)

## 2024-10-17 PROCEDURE — 99239 HOSP IP/OBS DSCHRG MGMT >30: CPT

## 2024-10-17 RX ORDER — LOSARTAN POTASSIUM 100 MG/1
1 TABLET, FILM COATED ORAL
Refills: 0 | DISCHARGE

## 2024-10-17 RX ORDER — MAG HYDROX/ALUMINUM HYD/SIMETH 200-200-20
30 SUSPENSION, ORAL (FINAL DOSE FORM) ORAL
Qty: 0 | Refills: 0 | DISCHARGE
Start: 2024-10-17

## 2024-10-17 RX ORDER — HYDROXYZINE PAMOATE 50 MG
2 CAPSULE ORAL
Refills: 0 | DISCHARGE

## 2024-10-17 RX ORDER — CRANBERRY FRUIT EXTRACT 650 MG
1 CAPSULE ORAL
Qty: 30 | Refills: 0
Start: 2024-10-17 | End: 2024-11-15

## 2024-10-17 RX ORDER — PRAZOSIN HCL 5 MG
1 CAPSULE ORAL
Refills: 0 | DISCHARGE

## 2024-10-17 RX ORDER — MULTI VITAMIN/MINERAL SUPPLEMENT WITH ASCORBIC ACID, NIACIN, PYRIDOXINE, PANTOTHENIC ACID, FOLIC ACID, RIBOFLAVIN, THIAMIN, BIOTIN, COBALAMIN AND ZINC. 60; 20; 12.5; 10; 10; 1.7; 1.5; 1; .3; .006 MG/1; MG/1; MG/1; MG/1; MG/1; MG/1; MG/1; MG/1; MG/1; MG/1
1 TABLET, COATED ORAL
Qty: 30 | Refills: 0
Start: 2024-10-17 | End: 2024-11-15

## 2024-10-17 RX ORDER — FAMOTIDINE 40 MG
1 TABLET ORAL
Qty: 30 | Refills: 0
Start: 2024-10-17 | End: 2024-11-15

## 2024-10-17 RX ADMIN — Medication 5000 UNIT(S): at 05:47

## 2024-10-17 RX ADMIN — Medication 5000 UNIT(S): at 11:34

## 2024-10-17 RX ADMIN — Medication 75 MILLILITER(S): at 12:11

## 2024-10-17 RX ADMIN — Medication 2.5 MILLIGRAM(S): at 05:48

## 2024-10-17 RX ADMIN — Medication 50 MICROGRAM(S): at 05:47

## 2024-10-17 RX ADMIN — Medication 75 MILLILITER(S): at 02:13

## 2024-10-17 NOTE — PROGRESS NOTE ADULT - PROVIDER SPECIALTY LIST ADULT
Nephrology
Nephrology
Infectious Disease
Nephrology
Hospitalist
Internal Medicine
Nephrology
Internal Medicine
Hospitalist
Nephrology
Hospitalist

## 2024-10-17 NOTE — PROGRESS NOTE ADULT - ASSESSMENT
69-year-old female comes into the ER with Symptoms since yesterday of fevers up to 102, cough general malaise weakness rigors.  nephrology consulted for acute on ckd and electrolyte anonymities    Acute on Chronic Kidney Disease stage 3  Baseline ~ 1.5  renal function remains 2.2 today. c/w 1/2 NS with 75 meq sodium bicarb at 75 cc/hour  FeNa<1%, repeat FeNa 5.6%   Renal US unremarkable.  ARB on hold  CBC with diff reviewed wnl  monitor BMP and Urine Output.  avoid nephrotoxic agents    Hyponatremia  Associated with Hypovolemia.  improved  avoid overcorrection, Na should not correct >8meq in 24 hrs    Fever  Possibly d/t UTI.  Workup and management per team.    Metabolic Acidosis  non AG + AG  c/w bicarb gtt   monitor    hypocalcemia  Repeat  - high for CKD stage.  Likely in setting of hypoalbuminemia vs Vit. D insufficiency - Vit. D 25OH 25.4.  cw Vit D3 2000units qd.  Optimize albumin.  monitor    proteinuria  UPCR 1 gram related to UTI and or DM likely  known Arthritis and Sjogren syndrome follows rheum  being followed in office  When renal function stabilizes will benefit from ACE/ARB/SGLT2     hypotensive  Bp stable  Monitor    69-year-old female comes into the ER with Symptoms since yesterday of fevers up to 102, cough general malaise weakness rigors.  nephrology consulted for acute on ckd and electrolyte anonymities    Acute on Chronic Kidney Disease stage 3  Baseline ~ 1.5  renal function remains 2.2 today. c/w 1/2 NS with 75 meq sodium bicarb at 75 cc/hour  FeNa<1%, repeat FeNa 5.6%   Renal US unremarkable.  ARB on hold  monitor cbc with diff  monitor BMP and Urine Output.  avoid nephrotoxic agents    Hyponatremia  Associated with Hypovolemia.  improved  avoid overcorrection, Na should not correct >8meq in 24 hrs    Fever  Possibly d/t UTI.  Workup and management per team.    Metabolic Acidosis  non AG + AG  c/w bicarb gtt   monitor    hypocalcemia  Repeat  - high for CKD stage.  Likely in setting of hypoalbuminemia vs Vit. D insufficiency - Vit. D 25OH 25.4.  cw Vit D3 2000units qd.  Optimize albumin.  monitor    proteinuria  UPCR 1 gram related to UTI and or DM likely  known Arthritis and Sjogren syndrome follows rheum  being followed in office  When renal function stabilizes will benefit from ACE/ARB/SGLT2     hypotensive  Bp stable  Monitor

## 2024-10-17 NOTE — PROGRESS NOTE ADULT - SUBJECTIVE AND OBJECTIVE BOX
[facility]    Dr. Dimas Martínez     Office [phone] (9 am to 5 pm)  Service: [phone] (5pm to 9am)        RENAL PROGRESS NOTE: DATE OF SERVICE 10-14-24 @ 11:29    Patient is a 69y old  Female who presents with a chief complaint of sepsis 2/2 UTI, maren on ckd, hyponatremia, hypocalcemia (13 Oct 2024 15:17)      Patient seen and examined at bedside. No chest pain/sob    VITALS:  T(F): 98 (10-14-24 @ 05:00), Max: 98.3 (10-13-24 @ 20:12)  HR: 77 (10-14-24 @ 05:00)  BP: 138/82 (10-14-24 @ 05:00)  RR: 18 (10-14-24 @ 05:00)  SpO2: 94% (10-14-24 @ 05:00)  Wt(kg): --    10-13 @ 07:01  -  10-14 @ 07:00  --------------------------------------------------------  IN: 800 mL / OUT: 0 mL / NET: 800 mL          PHYSICAL EXAM:  Constitutional: NAD  Neck: No JVD  Respiratory: CTAB, no wheezes, rales or rhonchi  Cardiovascular: S1, S2, RRR  Gastrointestinal: BS+, soft, NT/ND  Extremities: No peripheral edema    Hospital Medications:   MEDICATIONS  (STANDING):  chlorhexidine 2% Cloths 1 Application(s) Topical daily  cholecalciferol 2000 Unit(s) Oral daily  dextrose 5%. 1000 milliLiter(s) (50 mL/Hr) IV Continuous <Continuous>  dextrose 5%. 1000 milliLiter(s) (100 mL/Hr) IV Continuous <Continuous>  dextrose 50% Injectable 25 Gram(s) IV Push once  dextrose 50% Injectable 25 Gram(s) IV Push once  dextrose 50% Injectable 12.5 Gram(s) IV Push once  ertapenem  IVPB 500 milliGRAM(s) IV Intermittent every 24 hours  glucagon  Injectable 1 milliGRAM(s) IntraMuscular once  heparin   Injectable 5000 Unit(s) SubCutaneous every 8 hours  insulin lispro (ADMELOG) corrective regimen sliding scale   SubCutaneous at bedtime  insulin lispro (ADMELOG) corrective regimen sliding scale   SubCutaneous three times a day before meals  lactated ringers. 1000 milliLiter(s) (75 mL/Hr) IV Continuous <Continuous>  lactated ringers. 1000 milliLiter(s) (75 mL/Hr) IV Continuous <Continuous>  levothyroxine 50 MICROGram(s) Oral daily  magnesium oxide 400 milliGRAM(s) Oral daily  Nephro-kyler 1 Tablet(s) Oral daily  pantoprazole    Tablet 40 milliGRAM(s) Oral before breakfast  polyethylene glycol 3350 17 Gram(s) Oral daily  prednisoLONE Syrup 3 mG/mL (PRELONE) 2.5 milliGRAM(s) Oral every 24 hours      LABS:  10-14    134[L]  |  102  |  52[H]  ----------------------------<  91  4.5   |  20[L]  |  1.97[H]    Ca    8.7      14 Oct 2024 05:41  Phos  4.2     10-14  Mg     2.00     10-14      Creatinine Trend: 1.97 <--, 1.77 <--, 1.95 <--, 1.93 <--, 2.00 <--, 2.35 <--, 2.68 <--    Phosphorus: 4.2 mg/dL (10-14 @ 05:41)                              10.6   8.16  )-----------( 288      ( 14 Oct 2024 05:41 )             32.3     Urine Studies:  Urinalysis - [10-14-24 @ 05:41]      Color  / Appearance  / SG  / pH       Gluc 91 / Ketone   / Bili  / Urobili        Blood  / Protein  / Leuk Est  / Nitrite       RBC  / WBC  / Hyaline  / Gran  / Sq Epi  / Non Sq Epi  / Bacteria       PTH -- (Ca --)      [10-06-24 @ 05:56]   133  PTH -- (Ca --)      [10-04-24 @ 18:55]   187  Vitamin D (25OH) 25.4      [10-06-24 @ 05:56]  Lipid: chol 118, , HDL 21, LDL --      [10-05-24 @ 06:45]        RADIOLOGY & ADDITIONAL STUDIES:  
[facility] [place] [facility]   MD MEDINA WORKMAN MD ANGELA WONG, PA    TEL:  OFFICE: [phone]  From 5pm-7am Answering Service [phone]    -- RENAL FOLLOW UP NOTE ---Date of Service 10-10-24 @ 12:41    Patient is a 69y old  Female who presents with a chief complaint of sepsis 2/2 UTI, maren on ckd, hyponatremia, hypocalcemia (09 Oct 2024 15:07)      Patient seen and examined at bedside. No chest pain/sob    VITALS:  T(F): 98.9 (10-10-24 @ 05:00), Max: 98.9 (10-10-24 @ 05:00)  HR: 61 (10-10-24 @ 05:00)  BP: 125/80 (10-10-24 @ 05:00)  RR: 17 (10-10-24 @ 05:00)  SpO2: 95% (10-10-24 @ 05:00)  Wt(kg): --    10-09 @ 07:01  -  10-10 @ 07:00  --------------------------------------------------------  IN: 920 mL / OUT: 0 mL / NET: 920 mL    10-10 @ 07:01  -  10-10 @ 12:41  --------------------------------------------------------  IN: 480 mL / OUT: 0 mL / NET: 480 mL          PHYSICAL EXAM:  General: NAD  Neck: No JVD  Respiratory: CTAB, no wheezes, rales or rhonchi  Cardiovascular: S1, S2, RRR  Gastrointestinal: BS+, soft, NT/ND  Extremities: No peripheral edema    Hospital Medications:   MEDICATIONS  (STANDING):  chlorhexidine 2% Cloths 1 Application(s) Topical daily  cholecalciferol 2000 Unit(s) Oral daily  dextrose 5%. 1000 milliLiter(s) (100 mL/Hr) IV Continuous <Continuous>  dextrose 5%. 1000 milliLiter(s) (50 mL/Hr) IV Continuous <Continuous>  dextrose 50% Injectable 25 Gram(s) IV Push once  dextrose 50% Injectable 25 Gram(s) IV Push once  dextrose 50% Injectable 12.5 Gram(s) IV Push once  glucagon  Injectable 1 milliGRAM(s) IntraMuscular once  heparin   Injectable 5000 Unit(s) SubCutaneous every 8 hours  hydrocortisone sodium succinate Injectable 10 milliGRAM(s) IV Push once  insulin lispro (ADMELOG) corrective regimen sliding scale   SubCutaneous at bedtime  insulin lispro (ADMELOG) corrective regimen sliding scale   SubCutaneous three times a day before meals  lactated ringers. 1000 milliLiter(s) (75 mL/Hr) IV Continuous <Continuous>  levothyroxine 50 MICROGram(s) Oral daily  magnesium oxide 400 milliGRAM(s) Oral daily  meropenem  IVPB 500 milliGRAM(s) IV Intermittent every 12 hours  mupirocin 2% Ointment 1 Application(s) Topical two times a day  pantoprazole    Tablet 40 milliGRAM(s) Oral before breakfast  polyethylene glycol 3350 17 Gram(s) Oral daily      LABS:  10-10    140  |  106  |  68[H]  ----------------------------<  88  4.1   |  24  |  2.00[H]    Ca    8.0[L]      10 Oct 2024 05:46  Phos  3.3     10-10  Mg     2.10     10-10      Creatinine Trend: 2.00 <--, 2.35 <--, 2.68 <--, 2.86 <--, 3.05 <--, 3.54 <--, 3.30 <--, 3.37 <--    Phosphorus: 3.3 mg/dL (10-10 @ 05:46)                              9.2    11.42 )-----------( 223      ( 10 Oct 2024 05:46 )             26.9     Urine Studies:  Urinalysis - [10-10-24 @ 05:46]      Color  / Appearance  / SG  / pH       Gluc 88 / Ketone   / Bili  / Urobili        Blood  / Protein  / Leuk Est  / Nitrite       RBC  / WBC  / Hyaline  / Gran  / Sq Epi  / Non Sq Epi  / Bacteria     Urine Creatinine 44      [10-06-24 @ 18:16]  Urine Protein 46      [10-05-24 @ 20:32]  Urine Sodium <20      [10-06-24 @ 18:16]  Urine Osmolality 334      [10-06-24 @ 18:16]    PTH -- (Ca --)      [10-06-24 @ 05:56]   133  PTH -- (Ca --)      [10-04-24 @ 18:55]   187  Vitamin D (25OH) 25.4      [10-06-24 @ 05:56]  Lipid: chol 118, , HDL 21, LDL --      [10-05-24 @ 06:45]        RADIOLOGY & ADDITIONAL STUDIES:  
MEGAN PANDYA  69y  Patient is a 69y old  Female who presents with a chief complaint of sepsis 2/2 UTI, michael on ckd, hyponatremia, hypocalcemia (13 Oct 2024 14:13)    HPI:  Admitted for UTI, followed for MICHAEL on CKD. No new complaints.      HEALTH ISSUES - PROBLEM Dx:  Sepsis    Acute UTI    Acute kidney injury superimposed on CKD    Electrolyte imbalance    Benign essential HTN    T2DM (type 2 diabetes mellitus)    HLD (hyperlipidemia)    H/O migratory polyarthritis          MEDICATIONS  (STANDING):  chlorhexidine 2% Cloths 1 Application(s) Topical daily  cholecalciferol 2000 Unit(s) Oral daily  dextrose 5%. 1000 milliLiter(s) (50 mL/Hr) IV Continuous <Continuous>  dextrose 5%. 1000 milliLiter(s) (100 mL/Hr) IV Continuous <Continuous>  dextrose 50% Injectable 25 Gram(s) IV Push once  dextrose 50% Injectable 25 Gram(s) IV Push once  dextrose 50% Injectable 12.5 Gram(s) IV Push once  ertapenem  IVPB 500 milliGRAM(s) IV Intermittent every 24 hours  glucagon  Injectable 1 milliGRAM(s) IntraMuscular once  heparin   Injectable 5000 Unit(s) SubCutaneous every 8 hours  insulin lispro (ADMELOG) corrective regimen sliding scale   SubCutaneous at bedtime  insulin lispro (ADMELOG) corrective regimen sliding scale   SubCutaneous three times a day before meals  lactated ringers. 1000 milliLiter(s) (75 mL/Hr) IV Continuous <Continuous>  lactated ringers. 1000 milliLiter(s) (75 mL/Hr) IV Continuous <Continuous>  levothyroxine 50 MICROGram(s) Oral daily  magnesium oxide 400 milliGRAM(s) Oral daily  Nephro-kyler 1 Tablet(s) Oral daily  pantoprazole    Tablet 40 milliGRAM(s) Oral before breakfast  polyethylene glycol 3350 17 Gram(s) Oral daily  prednisoLONE Syrup 3 mG/mL (PRELONE) 2.5 milliGRAM(s) Oral every 24 hours    MEDICATIONS  (PRN):  acetaminophen     Tablet .. 650 milliGRAM(s) Oral every 6 hours PRN Temp greater or equal to 38C (100.4F), Mild Pain (1 - 3)  aluminum hydroxide/magnesium hydroxide/simethicone Suspension 30 milliLiter(s) Oral every 4 hours PRN Dyspepsia  dextrose Oral Gel 15 Gram(s) Oral once PRN Blood Glucose LESS THAN 70 milliGRAM(s)/deciliter  melatonin 3 milliGRAM(s) Oral at bedtime PRN Insomnia  ondansetron Injectable 4 milliGRAM(s) IV Push every 8 hours PRN Nausea and/or Vomiting    Vital Signs Last 24 Hrs  T(C): 36.7 (13 Oct 2024 12:06), Max: 37.2 (12 Oct 2024 20:35)  T(F): 98 (13 Oct 2024 12:06), Max: 98.9 (12 Oct 2024 20:35)  HR: 82 (13 Oct 2024 12:06) (78 - 86)  BP: 122/74 (13 Oct 2024 12:06) (122/74 - 150/76)  BP(mean): --  RR: 18 (13 Oct 2024 12:06) (17 - 18)  SpO2: 95% (13 Oct 2024 12:06) (95% - 96%)    Parameters below as of 13 Oct 2024 12:06  Patient On (Oxygen Delivery Method): room air      Daily     Daily     PHYSICAL EXAM:  Constitutional: She appears comfortable and not distressed. Not diaphoretic.    Neck:  The thyroid is normal. Trachea is midline.     Respiratory: The lungs are clear to auscultation. No dullness and expansion is normal.    Cardiovascular: S1 and S2 are normal. No mummurs, rubs or gallops are present.    Gastrointestinal: The abdomen is soft. No tenderness is present. No masses are present. Bowel sounds are normal.    Genitourinary: The bladder is not distended. No CVA tenderness is present.    Extremities: No edema is noted. No deformities are present.    Neurological: Cognition is normal. Tone, power and sensation are normal. Gait is steady.    Skin: No leasions are seen  or palpated.    Lymph Nodes: No lymphadenopathy is present.    Psychiatric: Mood is appropriate. No hallucinations or flight of ideas are noted.                              9.9    9.50  )-----------( 259      ( 13 Oct 2024 07:20 )             29.8     10-13    137  |  103  |  45[H]  ----------------------------<  96  3.9   |  22  |  1.77[H]    Ca    8.4      13 Oct 2024 07:20  Phos  3.8     10-13  Mg     1.80     10-13  Creatinine: 3.37 mg/dL (10.04.24 @ 12:45)   < from: US Kidney and Bladder (10.05.24 @ 10:41) >  Right kidney: 9.1 cm. No renal mass, hydronephrosis or calculi.    Left kidney: 9.6 cm. No renal mass, hydronephrosis or calculi.    Urinary bladder: Within normal limits.      < end of copied text >  
[facility] [place] [facility]   MD MEDINA WORKMAN MD ANGELA WONG, PA    TEL:  OFFICE: [phone]  From 5pm-7am Answering Service [phone]    -- RENAL FOLLOW UP NOTE ---Date of Service 10-08-24 @ 16:05    Patient is a 69y old  Female who presents with a chief complaint of sepsis 2/2 UTI, maren on ckd, hyponatremia, hypocalcemia (08 Oct 2024 14:55)      Patient seen and examined at bedside. No chest pain/sob    VITALS:  T(F): 97.5 (10-08-24 @ 15:11), Max: 98.2 (10-07-24 @ 20:48)  HR: 60 (10-08-24 @ 15:11)  BP: 125/68 (10-08-24 @ 15:11)  RR: 18 (10-08-24 @ 15:11)  SpO2: 95% (10-08-24 @ 15:11)  Wt(kg): --        PHYSICAL EXAM:  General: NAD  Neck: No JVD  Respiratory: CTAB, no wheezes, rales or rhonchi  Cardiovascular: S1, S2, RRR  Gastrointestinal: BS+, soft, NT/ND  Extremities: No peripheral edema    Hospital Medications:   MEDICATIONS  (STANDING):  chlorhexidine 2% Cloths 1 Application(s) Topical daily  cholecalciferol 2000 Unit(s) Oral daily  dextrose 5% 1000 milliLiter(s) (75 mL/Hr) IV Continuous <Continuous>  dextrose 5%. 1000 milliLiter(s) (50 mL/Hr) IV Continuous <Continuous>  dextrose 5%. 1000 milliLiter(s) (100 mL/Hr) IV Continuous <Continuous>  dextrose 50% Injectable 25 Gram(s) IV Push once  dextrose 50% Injectable 25 Gram(s) IV Push once  dextrose 50% Injectable 12.5 Gram(s) IV Push once  glucagon  Injectable 1 milliGRAM(s) IntraMuscular once  heparin   Injectable 5000 Unit(s) SubCutaneous every 8 hours  hydrocortisone sodium succinate Injectable 10 milliGRAM(s) IV Push every 6 hours  insulin lispro (ADMELOG) corrective regimen sliding scale   SubCutaneous at bedtime  insulin lispro (ADMELOG) corrective regimen sliding scale   SubCutaneous three times a day before meals  levothyroxine 50 MICROGram(s) Oral daily  magnesium oxide 400 milliGRAM(s) Oral daily  meropenem  IVPB 500 milliGRAM(s) IV Intermittent every 12 hours  mupirocin 2% Ointment 1 Application(s) Topical two times a day  pantoprazole    Tablet 40 milliGRAM(s) Oral before breakfast  polyethylene glycol 3350 17 Gram(s) Oral daily  sodium bicarbonate  Infusion 0.11 mEq/kG/Hr (75 mL/Hr) IV Continuous <Continuous>      LABS:  10-08    137  |  102  |  88[H]  ----------------------------<  121[H]  3.8   |  21[L]  |  2.68[H]    Ca    7.7[L]      08 Oct 2024 06:43  Phos  4.8     10-08  Mg     2.30     10-08      Creatinine Trend: 2.68 <--, 2.86 <--, 3.05 <--, 3.54 <--, 3.30 <--, 3.37 <--    Phosphorus: 4.8 mg/dL (10-08 @ 06:43)                              9.8    10.22 )-----------( 213      ( 08 Oct 2024 06:43 )             27.9     Urine Studies:  Urinalysis - [10-08-24 @ 06:43]      Color  / Appearance  / SG  / pH       Gluc 121 / Ketone   / Bili  / Urobili        Blood  / Protein  / Leuk Est  / Nitrite       RBC  / WBC  / Hyaline  / Gran  / Sq Epi  / Non Sq Epi  / Bacteria     Urine Creatinine 44      [10-06-24 @ 18:16]  Urine Protein 46      [10-05-24 @ 20:32]  Urine Sodium <20      [10-06-24 @ 18:16]  Urine Osmolality 334      [10-06-24 @ 18:16]    PTH -- (Ca --)      [10-06-24 @ 05:56]   133  PTH -- (Ca --)      [10-04-24 @ 18:55]   187  Vitamin D (25OH) 25.4      [10-06-24 @ 05:56]  Lipid: chol 118, , HDL 21, LDL --      [10-05-24 @ 06:45]        RADIOLOGY & ADDITIONAL STUDIES:  
[facility]    Dr. Dimas Martínez     Office [phone] (9 am to 5 pm)  Service: [phone] (5pm to 9am)        RENAL PROGRESS NOTE: DATE OF SERVICE 10-15-24 @ 11:09    Patient is a 69y old  Female who presents with a chief complaint of sepsis 2/2 UTI, maren on ckd, hyponatremia, hypocalcemia (14 Oct 2024 16:06)      Patient seen and examined at bedside. No chest pain/sob    VITALS:  T(F): 98.2 (10-15-24 @ 06:29), Max: 98.2 (10-14-24 @ 20:15)  HR: 74 (10-15-24 @ 06:29)  BP: 133/88 (10-15-24 @ 06:29)  RR: 18 (10-15-24 @ 06:29)  SpO2: 96% (10-15-24 @ 06:29)  Wt(kg): --        PHYSICAL EXAM:  Constitutional: NAD  Neck: No JVD  Respiratory: CTAB, no wheezes, rales or rhonchi  Cardiovascular: S1, S2, RRR  Gastrointestinal: BS+, soft, NT/ND  Extremities: No peripheral edema    Hospital Medications:   MEDICATIONS  (STANDING):  chlorhexidine 2% Cloths 1 Application(s) Topical daily  cholecalciferol 2000 Unit(s) Oral daily  dextrose 5%. 1000 milliLiter(s) (100 mL/Hr) IV Continuous <Continuous>  dextrose 5%. 1000 milliLiter(s) (50 mL/Hr) IV Continuous <Continuous>  dextrose 50% Injectable 25 Gram(s) IV Push once  dextrose 50% Injectable 25 Gram(s) IV Push once  dextrose 50% Injectable 12.5 Gram(s) IV Push once  ertapenem  IVPB 500 milliGRAM(s) IV Intermittent every 24 hours  glucagon  Injectable 1 milliGRAM(s) IntraMuscular once  heparin   Injectable 5000 Unit(s) SubCutaneous every 8 hours  insulin lispro (ADMELOG) corrective regimen sliding scale   SubCutaneous at bedtime  insulin lispro (ADMELOG) corrective regimen sliding scale   SubCutaneous three times a day before meals  lactated ringers. 1000 milliLiter(s) (75 mL/Hr) IV Continuous <Continuous>  lactated ringers. 1000 milliLiter(s) (75 mL/Hr) IV Continuous <Continuous>  levothyroxine 50 MICROGram(s) Oral daily  magnesium oxide 400 milliGRAM(s) Oral daily  Nephro-kyler 1 Tablet(s) Oral daily  pantoprazole    Tablet 40 milliGRAM(s) Oral before breakfast  polyethylene glycol 3350 17 Gram(s) Oral daily  prednisoLONE Syrup 3 mG/mL (PRELONE) 2.5 milliGRAM(s) Oral every 24 hours      LABS:  10-15    136  |  103  |  62[H]  ----------------------------<  79  4.3   |  18[L]  |  2.06[H]    Ca    8.8      15 Oct 2024 05:45  Phos  5.2     10-15  Mg     2.20     10-15      Creatinine Trend: 2.06 <--, 1.97 <--, 1.77 <--, 1.95 <--, 1.93 <--, 2.00 <--, 2.35 <--    Phosphorus: 5.2 mg/dL (10-15 @ 05:45)                              10.7   7.24  )-----------( 292      ( 15 Oct 2024 05:45 )             31.6     Urine Studies:  Urinalysis - [10-15-24 @ 05:45]      Color  / Appearance  / SG  / pH       Gluc 79 / Ketone   / Bili  / Urobili        Blood  / Protein  / Leuk Est  / Nitrite       RBC  / WBC  / Hyaline  / Gran  / Sq Epi  / Non Sq Epi  / Bacteria       PTH -- (Ca --)      [10-06-24 @ 05:56]   133  PTH -- (Ca --)      [10-04-24 @ 18:55]   187  Vitamin D (25OH) 25.4      [10-06-24 @ 05:56]  Lipid: chol 118, , HDL 21, LDL --      [10-05-24 @ 06:45]        RADIOLOGY & ADDITIONAL STUDIES:  
[facility] [place] [facility]   MD MEDINA WORKMAN MD ANGELA WONG, PA Venitha Krishnan, NP    TEL:  OFFICE: [phone]  From 5pm-7am Answering Service [phone]    -- RENAL FOLLOW UP NOTE ---Date of Service 10-16-24 @ 14:03    Patient is a 69y old  Female who presents with a chief complaint of sepsis 2/2 UTI, maren on ckd, hyponatremia, hypocalcemia (15 Oct 2024 16:59)      Patient seen and examined at bedside. No chest pain/sob    VITALS:  T(F): 97.9 (10-16-24 @ 05:40), Max: 98.5 (10-15-24 @ 20:38)  HR: 71 (10-16-24 @ 05:40)  BP: 124/71 (10-16-24 @ 05:40)  RR: 18 (10-16-24 @ 05:40)  SpO2: 97% (10-16-24 @ 05:40)  Wt(kg): --        PHYSICAL EXAM:  General: NAD  Neck: No JVD  Respiratory: CTAB, no wheezes, rales or rhonchi  Cardiovascular: S1, S2, RRR  Gastrointestinal: BS+, soft, NT/ND  Extremities: No peripheral edema    Hospital Medications:   MEDICATIONS  (STANDING):  chlorhexidine 2% Cloths 1 Application(s) Topical daily  cholecalciferol 2000 Unit(s) Oral daily  dextrose 5%. 1000 milliLiter(s) (100 mL/Hr) IV Continuous <Continuous>  dextrose 5%. 1000 milliLiter(s) (50 mL/Hr) IV Continuous <Continuous>  dextrose 50% Injectable 25 Gram(s) IV Push once  dextrose 50% Injectable 25 Gram(s) IV Push once  dextrose 50% Injectable 12.5 Gram(s) IV Push once  ertapenem  IVPB 500 milliGRAM(s) IV Intermittent every 24 hours  famotidine    Tablet 20 milliGRAM(s) Oral daily  glucagon  Injectable 1 milliGRAM(s) IntraMuscular once  heparin   Injectable 5000 Unit(s) SubCutaneous every 8 hours  insulin lispro (ADMELOG) corrective regimen sliding scale   SubCutaneous at bedtime  insulin lispro (ADMELOG) corrective regimen sliding scale   SubCutaneous three times a day before meals  lactated ringers. 1000 milliLiter(s) (75 mL/Hr) IV Continuous <Continuous>  lactated ringers. 1000 milliLiter(s) (75 mL/Hr) IV Continuous <Continuous>  levothyroxine 50 MICROGram(s) Oral daily  magnesium oxide 400 milliGRAM(s) Oral daily  Nephro-kyler 1 Tablet(s) Oral daily  polyethylene glycol 3350 17 Gram(s) Oral daily  prednisoLONE Syrup 3 mG/mL (PRELONE) 2.5 milliGRAM(s) Oral every 24 hours  sodium chloride 0.45% 1000 milliLiter(s) (75 mL/Hr) IV Continuous <Continuous>      LABS:  10-16    134[L]  |  99  |  75[H]  ----------------------------<  75  4.5   |  19[L]  |  2.27[H]    Ca    8.9      16 Oct 2024 05:43  Phos  5.3     10-16  Mg     2.20     10-16      Creatinine Trend: 2.27 <--, 2.06 <--, 1.97 <--, 1.77 <--, 1.95 <--, 1.93 <--, 2.00 <--    Phosphorus: 5.3 mg/dL (10-16 @ 05:43)                              10.5   8.04  )-----------( 317      ( 16 Oct 2024 05:43 )             30.9     Urine Studies:  Urinalysis - [10-16-24 @ 05:43]      Color  / Appearance  / SG  / pH       Gluc 75 / Ketone   / Bili  / Urobili        Blood  / Protein  / Leuk Est  / Nitrite       RBC  / WBC  / Hyaline  / Gran  / Sq Epi  / Non Sq Epi  / Bacteria     Urine Creatinine 21      [10-15-24 @ 23:50]  Urine Sodium 77      [10-15-24 @ 23:50]    PTH -- (Ca --)      [10-06-24 @ 05:56]   133  PTH -- (Ca --)      [10-04-24 @ 18:55]   187  Vitamin D (25OH) 25.4      [10-06-24 @ 05:56]  Lipid: chol 118, , HDL 21, LDL --      [10-05-24 @ 06:45]        RADIOLOGY & ADDITIONAL STUDIES:  
MEGAN PANDYA  69y  Patient is a 69y old  Female who presents with a chief complaint of sepsis 2/2 UTI, michael on ckd, hyponatremia, hypocalcemia (11 Oct 2024 16:37)    HPI:  Followed for MICHAEL. No new complaints.      HEALTH ISSUES - PROBLEM Dx:  Sepsis    Acute UTI    Acute kidney injury superimposed on CKD    Electrolyte imbalance    Benign essential HTN    T2DM (type 2 diabetes mellitus)    HLD (hyperlipidemia)    H/O migratory polyarthritis          MEDICATIONS  (STANDING):  chlorhexidine 2% Cloths 1 Application(s) Topical daily  cholecalciferol 2000 Unit(s) Oral daily  dextrose 5%. 1000 milliLiter(s) (50 mL/Hr) IV Continuous <Continuous>  dextrose 5%. 1000 milliLiter(s) (100 mL/Hr) IV Continuous <Continuous>  dextrose 50% Injectable 25 Gram(s) IV Push once  dextrose 50% Injectable 25 Gram(s) IV Push once  dextrose 50% Injectable 12.5 Gram(s) IV Push once  glucagon  Injectable 1 milliGRAM(s) IntraMuscular once  heparin   Injectable 5000 Unit(s) SubCutaneous every 8 hours  insulin lispro (ADMELOG) corrective regimen sliding scale   SubCutaneous at bedtime  insulin lispro (ADMELOG) corrective regimen sliding scale   SubCutaneous three times a day before meals  lactated ringers. 1000 milliLiter(s) (75 mL/Hr) IV Continuous <Continuous>  lactated ringers. 1000 milliLiter(s) (75 mL/Hr) IV Continuous <Continuous>  levothyroxine 50 MICROGram(s) Oral daily  magnesium oxide 400 milliGRAM(s) Oral daily  meropenem  IVPB 500 milliGRAM(s) IV Intermittent every 12 hours  Nephro-kyler 1 Tablet(s) Oral daily  pantoprazole    Tablet 40 milliGRAM(s) Oral before breakfast  polyethylene glycol 3350 17 Gram(s) Oral daily  prednisoLONE Syrup 3 mG/mL (PRELONE) 2.5 milliGRAM(s) Oral every 24 hours    MEDICATIONS  (PRN):  acetaminophen     Tablet .. 650 milliGRAM(s) Oral every 6 hours PRN Temp greater or equal to 38C (100.4F), Mild Pain (1 - 3)  aluminum hydroxide/magnesium hydroxide/simethicone Suspension 30 milliLiter(s) Oral every 4 hours PRN Dyspepsia  dextrose Oral Gel 15 Gram(s) Oral once PRN Blood Glucose LESS THAN 70 milliGRAM(s)/deciliter  melatonin 3 milliGRAM(s) Oral at bedtime PRN Insomnia  ondansetron Injectable 4 milliGRAM(s) IV Push every 8 hours PRN Nausea and/or Vomiting    Vital Signs Last 24 Hrs  T(C): 36.9 (12 Oct 2024 05:15), Max: 36.9 (11 Oct 2024 20:41)  T(F): 98.4 (12 Oct 2024 05:15), Max: 98.4 (11 Oct 2024 20:41)  HR: 78 (12 Oct 2024 05:15) (68 - 91)  BP: 137/71 (12 Oct 2024 05:15) (129/74 - 137/71)  BP(mean): --  RR: 17 (12 Oct 2024 05:15) (17 - 18)  SpO2: 95% (12 Oct 2024 05:15) (95% - 97%)    Parameters below as of 12 Oct 2024 05:15  Patient On (Oxygen Delivery Method): room air      Daily     Daily     PHYSICAL EXAM:  Constitutional: She  appears comfortable and not distressed. Not diaphoretic.    Neck:  The thyroid is normal. Trachea is midline.     Breasts: Normal examination.    Respiratory: The lungs are clear to auscultation. No dullness and expansion is normal.    Cardiovascular: S1 and S2 are normal. No mummurs, rubs or gallops are present.    Gastrointestinal: The abdomen is soft. No tenderness is present. No masses are present. Bowel sounds are normal.    Genitourinary: The bladder is not distended. No CVA tenderness is present.    Extremities: No edema is noted. No deformities are present.    Neurological: Cognition is normal. Tone, power and sensation are normal. Gait is steady.    Skin: No leasions are seen  or palpated.    Lymph Nodes: No lymphadenopathy is present.    Psychiatric: Mood is appropriate. No hallucinations or flight of ideas are noted.                              9.1    9.07  )-----------( 249      ( 12 Oct 2024 05:38 )             26.6     10-12    139  |  102  |  50[H]  ----------------------------<  80  4.2   |  22  |  1.95[H]    Ca    8.1[L]      12 Oct 2024 05:38  Phos  3.6     10-12  Mg     1.70     10-12    
[facility] [place] [facility]   MD MEDINA WORKMAN MD ANGELA WONG, PA    TEL:  OFFICE: [phone]  From 5pm-7am Answering Service [phone]    -- RENAL FOLLOW UP NOTE ---Date of Service 10-11-24 @ 09:16    Patient is a 69y old  Female who presents with a chief complaint of sepsis 2/2 UTI, maren on ckd, hyponatremia, hypocalcemia (10 Oct 2024 16:23)      Patient seen and examined at bedside. No chest pain/sob    VITALS:  T(F): 98 (10-11-24 @ 05:35), Max: 98.8 (10-10-24 @ 20:58)  HR: 70 (10-11-24 @ 05:35)  BP: 142/94 (10-11-24 @ 05:35)  RR: 17 (10-11-24 @ 05:35)  SpO2: 96% (10-11-24 @ 05:35)  Wt(kg): --    10-10 @ 07:01  -  10-11 @ 07:00  --------------------------------------------------------  IN: 1930 mL / OUT: 0 mL / NET: 1930 mL      Height (cm): 160 (10-10 @ 19:41)    PHYSICAL EXAM:  General: NAD  Neck: No JVD  Respiratory: CTAB, no wheezes, rales or rhonchi  Cardiovascular: S1, S2, RRR  Gastrointestinal: BS+, soft, NT/ND  Extremities: No peripheral edema    Hospital Medications:   MEDICATIONS  (STANDING):  chlorhexidine 2% Cloths 1 Application(s) Topical daily  cholecalciferol 2000 Unit(s) Oral daily  dextrose 5%. 1000 milliLiter(s) (100 mL/Hr) IV Continuous <Continuous>  dextrose 5%. 1000 milliLiter(s) (50 mL/Hr) IV Continuous <Continuous>  dextrose 50% Injectable 25 Gram(s) IV Push once  dextrose 50% Injectable 25 Gram(s) IV Push once  dextrose 50% Injectable 12.5 Gram(s) IV Push once  glucagon  Injectable 1 milliGRAM(s) IntraMuscular once  heparin   Injectable 5000 Unit(s) SubCutaneous every 8 hours  insulin lispro (ADMELOG) corrective regimen sliding scale   SubCutaneous at bedtime  insulin lispro (ADMELOG) corrective regimen sliding scale   SubCutaneous three times a day before meals  lactated ringers. 1000 milliLiter(s) (75 mL/Hr) IV Continuous <Continuous>  levothyroxine 50 MICROGram(s) Oral daily  magnesium oxide 400 milliGRAM(s) Oral daily  meropenem  IVPB 500 milliGRAM(s) IV Intermittent every 12 hours  mupirocin 2% Ointment 1 Application(s) Topical two times a day  pantoprazole    Tablet 40 milliGRAM(s) Oral before breakfast  polyethylene glycol 3350 17 Gram(s) Oral daily  prednisoLONE Syrup 3 mG/mL (PRELONE) 2.5 milliGRAM(s) Oral every 24 hours      LABS:  10-11    140  |  105  |  x   ----------------------------<  x   4.1   |  x   |  x     Ca    7.7[L]      11 Oct 2024 05:33  Phos  3.3     10-10  Mg     2.10     10-10      Creatinine Trend: 2.00 <--, 2.35 <--, 2.68 <--, 2.86 <--, 3.05 <--, 3.54 <--, 3.30 <--, 3.37 <--                                8.9    10.05 )-----------( 229      ( 11 Oct 2024 05:33 )             26.2     Urine Studies:  Urinalysis - [10-10-24 @ 05:46]      Color  / Appearance  / SG  / pH       Gluc 88 / Ketone   / Bili  / Urobili        Blood  / Protein  / Leuk Est  / Nitrite       RBC  / WBC  / Hyaline  / Gran  / Sq Epi  / Non Sq Epi  / Bacteria     Urine Creatinine 44      [10-06-24 @ 18:16]  Urine Protein 46      [10-05-24 @ 20:32]  Urine Sodium <20      [10-06-24 @ 18:16]  Urine Osmolality 334      [10-06-24 @ 18:16]    PTH -- (Ca --)      [10-06-24 @ 05:56]   133  PTH -- (Ca --)      [10-04-24 @ 18:55]   187  Vitamin D (25OH) 25.4      [10-06-24 @ 05:56]  Lipid: chol 118, , HDL 21, LDL --      [10-05-24 @ 06:45]        RADIOLOGY & ADDITIONAL STUDIES:  
[facility] [place] [facility]   MD MEDINA WORKMAN MD ANGELA WONG, PA QIAN CHEN, NP    TEL:  OFFICE: [phone]  From 5pm-7am Answering Service [phone]    -- RENAL FOLLOW UP NOTE ---Date of Service 10-06-24 @ 12:10    Patient is a 69y old  Female who presents with a chief complaint of sepsis 2/2 UTI, maren on ckd, hyponatremia, hypocalcemia.    Patient seen and examined at bedside. No chest pain/SOB.    VITALS:  T(F): 97.9 (10-06-24 @ 05:11), Max: 97.9 (10-06-24 @ 05:11)  HR: 55 (10-06-24 @ 05:11)  BP: 108/65 (10-06-24 @ 05:11)  RR: 18 (10-06-24 @ 05:11)  SpO2: 95% (10-06-24 @ 05:11)  Wt(kg): --    PHYSICAL EXAM:  General: NAD  Neck: No JVD  Respiratory: CTAB, no wheezes, rales or rhonchi  Cardiovascular: S1, S2, RRR  Gastrointestinal: BS+, soft, NT/ND  Extremities: No peripheral edema    Hospital Medications:   MEDICATIONS  (STANDING):  chlorhexidine 2% Cloths 1 Application(s) Topical daily  dextrose 5%. 1000 milliLiter(s) (50 mL/Hr) IV Continuous <Continuous>  dextrose 5%. 1000 milliLiter(s) (100 mL/Hr) IV Continuous <Continuous>  dextrose 50% Injectable 25 Gram(s) IV Push once  dextrose 50% Injectable 12.5 Gram(s) IV Push once  dextrose 50% Injectable 25 Gram(s) IV Push once  glucagon  Injectable 1 milliGRAM(s) IntraMuscular once  heparin   Injectable 5000 Unit(s) SubCutaneous every 8 hours  hydrocortisone sodium succinate Injectable 50 milliGRAM(s) IV Push every 6 hours  insulin lispro (ADMELOG) corrective regimen sliding scale   SubCutaneous three times a day before meals  insulin lispro (ADMELOG) corrective regimen sliding scale   SubCutaneous at bedtime  levothyroxine 50 MICROGram(s) Oral daily  magnesium oxide 400 milliGRAM(s) Oral daily  meropenem  IVPB 500 milliGRAM(s) IV Intermittent every 12 hours  pantoprazole    Tablet 40 milliGRAM(s) Oral before breakfast  polyethylene glycol 3350 17 Gram(s) Oral daily  sodium bicarbonate  Infusion 0.11 mEq/kG/Hr (75 mL/Hr) IV Continuous <Continuous>      LABS:  10-06    133[L]  |  102  |  80[H]  ----------------------------<  165[H]  4.2   |  17[L]  |  3.05[H]    Ca    7.4[L]      06 Oct 2024 05:56  Phos  3.9     10-06  Mg     2.50     10-06    TPro  6.0  /  Alb  3.1[L]  /  TBili  0.4  /  DBili      /  AST  22  /  ALT  17  /  AlkPhos  86  10-04    Creatinine Trend: 3.05 <--, 3.54 <--, 3.30 <--, 3.37 <--    Phosphorus: 3.9 mg/dL (10-06 @ 05:56)    calcium--  intact [id number]  parathyroid hormone intact, serum--                       9.3    16.41 )-----------( 156      ( 06 Oct 2024 05:56 )             25.8     Urine Studies:  Urinalysis - [10-06-24 @ 05:56]      Color  / Appearance  / SG  / pH       Gluc 165 / Ketone   / Bili  / Urobili        Blood  / Protein  / Leuk Est  / Nitrite       RBC  / WBC  / Hyaline  / Gran  / Sq Epi  / Non Sq Epi  / Bacteria     Urine Creatinine 44      [10-05-24 @ 20:32]  Urine Protein 46      [10-05-24 @ 20:32]  Urine Sodium < 20      [10-05-24 @ 15:32]    PTH -- (Ca --)      [10-06-24 @ 05:56]   133  PTH -- (Ca --)      [10-04-24 @ 18:55]   187  Vitamin D (25OH) 25.4      [10-04-24 @ 18:55]  Lipid: chol 118, , HDL 21, LDL --      [10-05-24 @ 06:45]        
[facility] [place] [facility]   MD MEDINA WORKMAN MD ANGELA WONG, PA Venitha Krishnan, NP    TEL:  OFFICE: [phone]  From 5pm-7am Answering Service [phone]    -- RENAL FOLLOW UP NOTE ---Date of Service 10-17-24 @ 10:49    Patient is a 69y old  Female who presents with a chief complaint of sepsis 2/2 UTI, maren on ckd, hyponatremia, hypocalcemia (16 Oct 2024 15:49)      Patient seen and examined at bedside. No chest pain/sob    VITALS:  T(F): 97.5 (10-17-24 @ 06:04), Max: 98.9 (10-16-24 @ 14:17)  HR: 80 (10-17-24 @ 06:04)  BP: 133/64 (10-17-24 @ 06:04)  RR: 17 (10-17-24 @ 06:04)  SpO2: 94% (10-17-24 @ 06:04)  Wt(kg): --    10-16 @ 07:01  -  10-17 @ 07:00  --------------------------------------------------------  IN: 1750 mL / OUT: 0 mL / NET: 1750 mL          PHYSICAL EXAM:  General: NAD  Neck: No JVD  Respiratory: CTAB, no wheezes, rales or rhonchi  Cardiovascular: S1, S2, RRR  Gastrointestinal: BS+, soft, NT/ND  Extremities: No peripheral edema    Hospital Medications:   MEDICATIONS  (STANDING):  chlorhexidine 2% Cloths 1 Application(s) Topical daily  cholecalciferol 2000 Unit(s) Oral daily  dextrose 5%. 1000 milliLiter(s) (100 mL/Hr) IV Continuous <Continuous>  dextrose 5%. 1000 milliLiter(s) (50 mL/Hr) IV Continuous <Continuous>  dextrose 50% Injectable 25 Gram(s) IV Push once  dextrose 50% Injectable 25 Gram(s) IV Push once  dextrose 50% Injectable 12.5 Gram(s) IV Push once  famotidine    Tablet 20 milliGRAM(s) Oral daily  glucagon  Injectable 1 milliGRAM(s) IntraMuscular once  heparin   Injectable 5000 Unit(s) SubCutaneous every 8 hours  insulin lispro (ADMELOG) corrective regimen sliding scale   SubCutaneous three times a day before meals  insulin lispro (ADMELOG) corrective regimen sliding scale   SubCutaneous at bedtime  lactated ringers. 1000 milliLiter(s) (75 mL/Hr) IV Continuous <Continuous>  lactated ringers. 1000 milliLiter(s) (75 mL/Hr) IV Continuous <Continuous>  levothyroxine 50 MICROGram(s) Oral daily  magnesium oxide 400 milliGRAM(s) Oral daily  Nephro-kyler 1 Tablet(s) Oral daily  polyethylene glycol 3350 17 Gram(s) Oral daily  prednisoLONE Syrup 3 mG/mL (PRELONE) 2.5 milliGRAM(s) Oral every 24 hours  sodium chloride 0.45% 1000 milliLiter(s) (75 mL/Hr) IV Continuous <Continuous>      LABS:  10-17    138  |  100  |  68[H]  ----------------------------<  74  4.7   |  21[L]  |  2.20[H]    Ca    9.0      17 Oct 2024 07:49  Phos  5.5     10-17  Mg     2.40     10-17      Creatinine Trend: 2.20 <--, 2.27 <--, 2.06 <--, 1.97 <--, 1.77 <--, 1.95 <--, 1.93 <--    Phosphorus: 5.5 mg/dL (10-17 @ 07:49)                              11.0   8.37  )-----------( 340      ( 17 Oct 2024 07:49 )             32.9     Urine Studies:  Urinalysis - [10-17-24 @ 07:49]      Color  / Appearance  / SG  / pH       Gluc 74 / Ketone   / Bili  / Urobili        Blood  / Protein  / Leuk Est  / Nitrite       RBC  / WBC  / Hyaline  / Gran  / Sq Epi  / Non Sq Epi  / Bacteria     Urine Creatinine 21      [10-15-24 @ 23:50]  Urine Sodium 77      [10-15-24 @ 23:50]    PTH -- (Ca --)      [10-06-24 @ 05:56]   133  PTH -- (Ca --)      [10-04-24 @ 18:55]   187  Vitamin D (25OH) 25.4      [10-06-24 @ 05:56]  Lipid: chol 118, , HDL 21, LDL --      [10-05-24 @ 06:45]        RADIOLOGY & ADDITIONAL STUDIES:  
[facility] [facility]   MD MEDINA WORKMAN MD MARIA SANTIAGO, NP    TEL:  OFFICE: [phone]  From 5pm-7am Answering Service [phone]    -- RENAL FOLLOW UP NOTE ---Date of Service 10-05-24 @ 15:23    Patient is a 69y old  Female who presents with a chief complaint of sepsis 2/2 UTI, maren on ckd, hyponatremia, hypocalcemia      Patient seen and examined at bedside. No chest pain/sob    VITALS:  T(F): 97.7 (10-05-24 @ 12:23), Max: 101.5 (10-04-24 @ 16:48)  HR: 63 (10-05-24 @ 12:23)  BP: 101/63 (10-05-24 @ 12:23)  RR: 18 (10-05-24 @ 12:23)  SpO2: 97% (10-05-24 @ 12:23)  Wt(kg): --        PHYSICAL EXAM:  General: NAD  Neck: No JVD  Respiratory: CTAB, no wheezes, rales or rhonchi  Cardiovascular: S1, S2, RRR  Gastrointestinal: BS+, soft, NT/ND  Extremities: No peripheral edema    Hospital Medications:   MEDICATIONS  (STANDING):  chlorhexidine 2% Cloths 1 Application(s) Topical daily  dextrose 5%. 1000 milliLiter(s) (100 mL/Hr) IV Continuous <Continuous>  dextrose 5%. 1000 milliLiter(s) (50 mL/Hr) IV Continuous <Continuous>  dextrose 50% Injectable 25 Gram(s) IV Push once  dextrose 50% Injectable 12.5 Gram(s) IV Push once  dextrose 50% Injectable 25 Gram(s) IV Push once  glucagon  Injectable 1 milliGRAM(s) IntraMuscular once  heparin   Injectable 5000 Unit(s) SubCutaneous every 8 hours  hydrocortisone sodium succinate Injectable 50 milliGRAM(s) IV Push every 6 hours  insulin lispro (ADMELOG) corrective regimen sliding scale   SubCutaneous three times a day before meals  insulin lispro (ADMELOG) corrective regimen sliding scale   SubCutaneous at bedtime  levothyroxine 50 MICROGram(s) Oral daily  magnesium oxide 400 milliGRAM(s) Oral daily  meropenem  IVPB 500 milliGRAM(s) IV Intermittent every 12 hours  pantoprazole    Tablet 40 milliGRAM(s) Oral before breakfast  polyethylene glycol 3350 17 Gram(s) Oral daily  sodium bicarbonate  Infusion 0.11 mEq/kG/Hr (75 mL/Hr) IV Continuous <Continuous>      LABS:  10-05    134[L]  |  104  |  70[H]  ----------------------------<  152[H]  4.0   |  16[L]  |  3.54[H]    Ca    7.3[L]      05 Oct 2024 06:45  Phos  2.5     10-04  Mg     2.10     10-04    TPro  6.0  /  Alb  3.1[L]  /  TBili  0.4  /  DBili      /  AST  22  /  ALT  17  /  AlkPhos  86  10-04    Creatinine Trend: 3.54 <--, 3.30 <--, 3.37 <--    Phosphorus: 2.5 mg/dL (10-04 @ 18:55)  Vitamin D, 25-Hydroxy: 25.4 ng/mL (10-04 @ 18:55)    calcium--  intact [id number]  parathyroid hormone intact, serum--                            9.7    22.02 )-----------( 141      ( 05 Oct 2024 06:45 )             28.4     Urine Studies:  Urinalysis - [10-05-24 @ 06:45]      Color  / Appearance  / SG  / pH       Gluc 152 / Ketone   / Bili  / Urobili        Blood  / Protein  / Leuk Est  / Nitrite       RBC  / WBC  / Hyaline  / Gran  / Sq Epi  / Non Sq Epi  / Bacteria       PTH -- (Ca --)      [10-04-24 @ 18:55]   187  Vitamin D (25OH) 25.4      [10-04-24 @ 18:55]  Lipid: chol 118, , HDL 21, LDL --      [10-05-24 @ 06:45]        RADIOLOGY & ADDITIONAL STUDIES:  
[facility] [place] [facility]   MD MEDINA WORKMAN MD RUORU WONG, PA    TEL:  FROM 9 AM to 5 PM ---OFFICE: [phone]  AVAILABLE ON TEAMS    FROM 5 PM - 9 AM PLEASE CALL ANSWERING SERVICE: [phone]    RENAL FOLLOW UP NOTE--Date of Service 10-07-24 @ 09:16  --------------------------------------------------------------------------------  HPI:      Pt seen and examined at bedside.       PAST HISTORY  --------------------------------------------------------------------------------  No significant changes to PMH, PSH, FHx, SHx, unless otherwise noted    ALLERGIES & MEDICATIONS  --------------------------------------------------------------------------------  Allergies    cefixime (Rash; Urticaria; Hives)  ciprofloxacin (Other)    Intolerances      Standing Inpatient Medications  chlorhexidine 2% Cloths 1 Application(s) Topical daily  cholecalciferol 2000 Unit(s) Oral daily  dextrose 5%. 1000 milliLiter(s) IV Continuous <Continuous>  dextrose 5%. 1000 milliLiter(s) IV Continuous <Continuous>  dextrose 50% Injectable 25 Gram(s) IV Push once  dextrose 50% Injectable 12.5 Gram(s) IV Push once  dextrose 50% Injectable 25 Gram(s) IV Push once  glucagon  Injectable 1 milliGRAM(s) IntraMuscular once  heparin   Injectable 5000 Unit(s) SubCutaneous every 8 hours  hydrocortisone sodium succinate Injectable 50 milliGRAM(s) IV Push every 6 hours  insulin lispro (ADMELOG) corrective regimen sliding scale   SubCutaneous three times a day before meals  insulin lispro (ADMELOG) corrective regimen sliding scale   SubCutaneous at bedtime  levothyroxine 50 MICROGram(s) Oral daily  magnesium oxide 400 milliGRAM(s) Oral daily  meropenem  IVPB 500 milliGRAM(s) IV Intermittent every 12 hours  mupirocin 2% Ointment 1 Application(s) Topical two times a day  pantoprazole    Tablet 40 milliGRAM(s) Oral before breakfast  polyethylene glycol 3350 17 Gram(s) Oral daily  sodium bicarbonate  Infusion 0.11 mEq/kG/Hr IV Continuous <Continuous>  sodium bicarbonate  Infusion 0.11 mEq/kG/Hr IV Continuous <Continuous>    PRN Inpatient Medications  acetaminophen     Tablet .. 650 milliGRAM(s) Oral every 6 hours PRN  aluminum hydroxide/magnesium hydroxide/simethicone Suspension 30 milliLiter(s) Oral every 4 hours PRN  dextrose Oral Gel 15 Gram(s) Oral once PRN  melatonin 3 milliGRAM(s) Oral at bedtime PRN  ondansetron Injectable 4 milliGRAM(s) IV Push every 8 hours PRN      REVIEW OF SYSTEMS  --------------------------------------------------------------------------------  General: no fever  MSK: no edema     VITALS/PHYSICAL EXAM  --------------------------------------------------------------------------------  T(C): 36.6 (10-07-24 @ 05:00), Max: 36.6 (10-06-24 @ 22:23)  HR: 57 (10-07-24 @ 05:00) (57 - 73)  BP: 121/69 (10-07-24 @ 05:00) (106/65 - 125/73)  RR: 18 (10-07-24 @ 05:00) (17 - 18)  SpO2: 95% (10-07-24 @ 05:00) (95% - 97%)  Wt(kg): --        Physical Exam:  	Gen: NAD  	HEENT: MMM  	Pulm: CTA B/L  	CV: S1S2  	Abd: Soft, +BS  	Ext: No LE edema B/L                      Neuro: Awake   	Skin: Warm and Dry   	Vascular access: NO HD catheter             no stafford  LABS/STUDIES  --------------------------------------------------------------------------------              9.7    14.30 >-----------<  178      [10-07-24 @ 06:00]              27.0     132  |  101  |  84  ----------------------------<  150      [10-07-24 @ 06:00]  4.3   |  15  |  2.86        Ca     7.8     [10-07-24 @ 06:00]      Mg     2.50     [10-07-24 @ 06:00]      Phos  4.9     [10-07-24 @ 06:00]            Creatinine Trend:  SCr 2.86 [10-07 @ 06:00]  SCr 3.05 [10-06 @ 05:56]  SCr 3.54 [10-05 @ 06:45]  SCr 3.30 [10-04 @ 18:55]  SCr 3.37 [10-04 @ 12:45]    Urinalysis - [10-07-24 @ 06:00]      Color  / Appearance  / SG  / pH       Gluc 150 / Ketone   / Bili  / Urobili        Blood  / Protein  / Leuk Est  / Nitrite       RBC  / WBC  / Hyaline  / Gran  / Sq Epi  / Non Sq Epi  / Bacteria     Urine Creatinine 44      [10-06-24 @ 18:16]  Urine Protein 46      [10-05-24 @ 20:32]  Urine Sodium <20      [10-06-24 @ 18:16]  Urine Osmolality 334      [10-06-24 @ 18:16]    PTH -- (Ca --)      [10-06-24 @ 05:56]   133  PTH -- (Ca --)      [10-04-24 @ 18:55]   187  Vitamin D (25OH) 25.4      [10-06-24 @ 05:56]  Lipid: chol 118, , HDL 21, LDL --      [10-05-24 @ 06:45]      
Mery Valadez        Patient is a 69y old  Female who presents with a chief complaint of sepsis 2/2 UTI, maren on ckd, hyponatremia, hypocalcemia (06 Oct 2024 12:10)      SUBJECTIVE / OVERNIGHT EVENTS: No acute overnight events. This morning pt doing well. No complaints    MEDICATIONS  (STANDING):  chlorhexidine 2% Cloths 1 Application(s) Topical daily  cholecalciferol 2000 Unit(s) Oral daily  dextrose 5%. 1000 milliLiter(s) (100 mL/Hr) IV Continuous <Continuous>  dextrose 5%. 1000 milliLiter(s) (50 mL/Hr) IV Continuous <Continuous>  dextrose 50% Injectable 25 Gram(s) IV Push once  dextrose 50% Injectable 12.5 Gram(s) IV Push once  dextrose 50% Injectable 25 Gram(s) IV Push once  glucagon  Injectable 1 milliGRAM(s) IntraMuscular once  heparin   Injectable 5000 Unit(s) SubCutaneous every 8 hours  hydrocortisone sodium succinate Injectable 50 milliGRAM(s) IV Push every 6 hours  insulin lispro (ADMELOG) corrective regimen sliding scale   SubCutaneous at bedtime  insulin lispro (ADMELOG) corrective regimen sliding scale   SubCutaneous three times a day before meals  levothyroxine 50 MICROGram(s) Oral daily  magnesium oxide 400 milliGRAM(s) Oral daily  meropenem  IVPB 500 milliGRAM(s) IV Intermittent every 12 hours  pantoprazole    Tablet 40 milliGRAM(s) Oral before breakfast  polyethylene glycol 3350 17 Gram(s) Oral daily  sodium bicarbonate  Infusion 0.11 mEq/kG/Hr (75 mL/Hr) IV Continuous <Continuous>  sodium bicarbonate  Infusion 0.11 mEq/kG/Hr (75 mL/Hr) IV Continuous <Continuous>    MEDICATIONS  (PRN):  acetaminophen     Tablet .. 650 milliGRAM(s) Oral every 6 hours PRN Temp greater or equal to 38C (100.4F), Mild Pain (1 - 3)  aluminum hydroxide/magnesium hydroxide/simethicone Suspension 30 milliLiter(s) Oral every 4 hours PRN Dyspepsia  dextrose Oral Gel 15 Gram(s) Oral once PRN Blood Glucose LESS THAN 70 milliGRAM(s)/deciliter  melatonin 3 milliGRAM(s) Oral at bedtime PRN Insomnia  ondansetron Injectable 4 milliGRAM(s) IV Push every 8 hours PRN Nausea and/or Vomiting    Allergies    cefixime (Rash; Urticaria; Hives)  ciprofloxacin (Other)    Intolerances        Vital Signs Last 24 Hrs  T(C): 36.4 (06 Oct 2024 12:22), Max: 36.6 (06 Oct 2024 01:19)  T(F): 97.5 (06 Oct 2024 12:22), Max: 97.9 (06 Oct 2024 05:11)  HR: 73 (06 Oct 2024 12:22) (55 - 73)  BP: 106/65 (06 Oct 2024 12:22) (106/65 - 120/67)  BP(mean): --  RR: 18 (06 Oct 2024 12:22) (18 - 18)  SpO2: 97% (06 Oct 2024 12:22) (95% - 97%)    Parameters below as of 06 Oct 2024 12:22  Patient On (Oxygen Delivery Method): room air      Daily     Daily   CAPILLARY BLOOD GLUCOSE      POCT Blood Glucose.: 144 mg/dL (06 Oct 2024 17:39)  POCT Blood Glucose.: 234 mg/dL (06 Oct 2024 12:07)  POCT Blood Glucose.: 143 mg/dL (06 Oct 2024 08:15)  POCT Blood Glucose.: 184 mg/dL (05 Oct 2024 22:40)    I&O's Summary      PHYSICAL EXAM:  GENERAL: NAD  HEAD:  Atraumatic, Normocephalic  EYES: EOMI,  conjunctiva and sclera clear  NECK: Supple  CHEST/LUNG: Clear to auscultation bilaterally; No wheeze, crackles or rhonchi   HEART: Regular rate and rhythm; Normal S1 S2, No murmurs, rubs, or gallops  ABDOMEN: Soft, Nontender, Nondistended; Bowel sounds present  EXTREMITIES:  2+ Peripheral Pulses, No edema  PSYCH: Awake and alert   NEUROLOGY: SROM  SKIN: Warm and dry    LABS:                        9.3    16.41 )-----------( 156      ( 06 Oct 2024 05:56 )             25.8     Hgb Trend: 9.3<--, 9.7<--, 10.0<--  10-06    133[L]  |  102  |  80[H]  ----------------------------<  165[H]  4.2   |  17[L]  |  3.05[H]    Ca    7.4[L]      06 Oct 2024 05:56  Phos  3.9     10-06  Mg     2.50     10-06      Creatinine Trend: 3.05<--, 3.54<--, 3.30<--, 3.37<--          Urinalysis Basic - ( 06 Oct 2024 05:56 )    Color: x / Appearance: x / SG: x / pH: x  Gluc: 165 mg/dL / Ketone: x  / Bili: x / Urobili: x   Blood: x / Protein: x / Nitrite: x   Leuk Esterase: x / RBC: x / WBC x   Sq Epi: x / Non Sq Epi: x / Bacteria: x        RADIOLOGY & ADDITIONAL TESTS:    Imaging Personally Reviewed.    Consultant(s) Notes Reviewed.    Care Discussed with Consultants/Other Providers.      
[facility] [place] [facility]   MD MEDINA WORKMAN MD ANGELA WONG, PA    TEL:  OFFICE: [phone]  From 5pm-7am Answering Service [phone]    -- RENAL FOLLOW UP NOTE ---Date of Service 10-09-24 @ 15:07    Patient is a 69y old  Female who presents with a chief complaint of sepsis 2/2 UTI, maren on ckd, hyponatremia, hypocalcemia (09 Oct 2024 14:52)      Patient seen and examined at bedside. No chest pain/sob    VITALS:  T(F): 97.7 (10-09-24 @ 12:59), Max: 98.1 (10-09-24 @ 01:11)  HR: 78 (10-09-24 @ 12:59)  BP: 115/68 (10-09-24 @ 12:59)  RR: 18 (10-09-24 @ 12:59)  SpO2: 98% (10-09-24 @ 12:59)  Wt(kg): --    10-09 @ 07:01  -  10-09 @ 15:07  --------------------------------------------------------  IN: 720 mL / OUT: 0 mL / NET: 720 mL          PHYSICAL EXAM:  General: NAD  Neck: No JVD  Respiratory: CTAB, no wheezes, rales or rhonchi  Cardiovascular: S1, S2, RRR  Gastrointestinal: BS+, soft, NT/ND  Extremities: No peripheral edema    Hospital Medications:   MEDICATIONS  (STANDING):  chlorhexidine 2% Cloths 1 Application(s) Topical daily  cholecalciferol 2000 Unit(s) Oral daily  dextrose 5% 1000 milliLiter(s) (75 mL/Hr) IV Continuous <Continuous>  dextrose 5%. 1000 milliLiter(s) (100 mL/Hr) IV Continuous <Continuous>  dextrose 5%. 1000 milliLiter(s) (50 mL/Hr) IV Continuous <Continuous>  dextrose 50% Injectable 25 Gram(s) IV Push once  dextrose 50% Injectable 12.5 Gram(s) IV Push once  dextrose 50% Injectable 25 Gram(s) IV Push once  glucagon  Injectable 1 milliGRAM(s) IntraMuscular once  heparin   Injectable 5000 Unit(s) SubCutaneous every 8 hours  hydrocortisone sodium succinate Injectable 10 milliGRAM(s) IV Push every 12 hours  insulin lispro (ADMELOG) corrective regimen sliding scale   SubCutaneous three times a day before meals  insulin lispro (ADMELOG) corrective regimen sliding scale   SubCutaneous at bedtime  levothyroxine 50 MICROGram(s) Oral daily  magnesium oxide 400 milliGRAM(s) Oral daily  meropenem  IVPB 500 milliGRAM(s) IV Intermittent every 12 hours  mupirocin 2% Ointment 1 Application(s) Topical two times a day  pantoprazole    Tablet 40 milliGRAM(s) Oral before breakfast  polyethylene glycol 3350 17 Gram(s) Oral daily  sodium bicarbonate  Infusion 0.11 mEq/kG/Hr (75 mL/Hr) IV Continuous <Continuous>      LABS:  10-09    138  |  103  |  85[H]  ----------------------------<  149[H]  3.3[L]   |  22  |  2.35[H]    Ca    7.7[L]      09 Oct 2024 05:25  Phos  4.4     10-09  Mg     2.20     10-09      Creatinine Trend: 2.35 <--, 2.68 <--, 2.86 <--, 3.05 <--, 3.54 <--, 3.30 <--, 3.37 <--    Phosphorus: 4.4 mg/dL (10-09 @ 05:25)                              9.0    10.58 )-----------( 203      ( 09 Oct 2024 05:25 )             25.5     Urine Studies:  Urinalysis - [10-09-24 @ 05:25]      Color  / Appearance  / SG  / pH       Gluc 149 / Ketone   / Bili  / Urobili        Blood  / Protein  / Leuk Est  / Nitrite       RBC  / WBC  / Hyaline  / Gran  / Sq Epi  / Non Sq Epi  / Bacteria     Urine Creatinine 44      [10-06-24 @ 18:16]  Urine Protein 46      [10-05-24 @ 20:32]  Urine Sodium <20      [10-06-24 @ 18:16]  Urine Osmolality 334      [10-06-24 @ 18:16]    PTH -- (Ca --)      [10-06-24 @ 05:56]   133  PTH -- (Ca --)      [10-04-24 @ 18:55]   187  Vitamin D (25OH) 25.4      [10-06-24 @ 05:56]  Lipid: chol 118, , HDL 21, LDL --      [10-05-24 @ 06:45]        RADIOLOGY & ADDITIONAL STUDIES:  
Patient is a 69y old  Female who presents with a chief complaint of sepsis 2/2 UTI, maren on ckd, hyponatremia, hypocalcemia (07 Oct 2024 15:59)      SUBJECTIVE / OVERNIGHT EVENTS: No acute events overnight. Pt has no new complaints. Daughter at bedside, discussed plan     ADDITIONAL REVIEW OF SYSTEMS:    MEDICATIONS  (STANDING):  chlorhexidine 2% Cloths 1 Application(s) Topical daily  cholecalciferol 2000 Unit(s) Oral daily  dextrose 5% 1000 milliLiter(s) (75 mL/Hr) IV Continuous <Continuous>  dextrose 5%. 1000 milliLiter(s) (100 mL/Hr) IV Continuous <Continuous>  dextrose 5%. 1000 milliLiter(s) (50 mL/Hr) IV Continuous <Continuous>  dextrose 50% Injectable 25 Gram(s) IV Push once  dextrose 50% Injectable 25 Gram(s) IV Push once  dextrose 50% Injectable 12.5 Gram(s) IV Push once  glucagon  Injectable 1 milliGRAM(s) IntraMuscular once  heparin   Injectable 5000 Unit(s) SubCutaneous every 8 hours  hydrocortisone sodium succinate Injectable 10 milliGRAM(s) IV Push every 6 hours  insulin lispro (ADMELOG) corrective regimen sliding scale   SubCutaneous three times a day before meals  insulin lispro (ADMELOG) corrective regimen sliding scale   SubCutaneous at bedtime  levothyroxine 50 MICROGram(s) Oral daily  magnesium oxide 400 milliGRAM(s) Oral daily  meropenem  IVPB 500 milliGRAM(s) IV Intermittent every 12 hours  mupirocin 2% Ointment 1 Application(s) Topical two times a day  pantoprazole    Tablet 40 milliGRAM(s) Oral before breakfast  polyethylene glycol 3350 17 Gram(s) Oral daily  sodium bicarbonate  Infusion 0.11 mEq/kG/Hr (75 mL/Hr) IV Continuous <Continuous>    MEDICATIONS  (PRN):  acetaminophen     Tablet .. 650 milliGRAM(s) Oral every 6 hours PRN Temp greater or equal to 38C (100.4F), Mild Pain (1 - 3)  aluminum hydroxide/magnesium hydroxide/simethicone Suspension 30 milliLiter(s) Oral every 4 hours PRN Dyspepsia  dextrose Oral Gel 15 Gram(s) Oral once PRN Blood Glucose LESS THAN 70 milliGRAM(s)/deciliter  melatonin 3 milliGRAM(s) Oral at bedtime PRN Insomnia  ondansetron Injectable 4 milliGRAM(s) IV Push every 8 hours PRN Nausea and/or Vomiting      CAPILLARY BLOOD GLUCOSE      POCT Blood Glucose.: 125 mg/dL (08 Oct 2024 13:22)  POCT Blood Glucose.: 140 mg/dL (08 Oct 2024 08:31)  POCT Blood Glucose.: 182 mg/dL (07 Oct 2024 21:55)  POCT Blood Glucose.: 204 mg/dL (07 Oct 2024 17:59)    I&O's Summary      PHYSICAL EXAM:  Vital Signs Last 24 Hrs  T(C): 36.3 (08 Oct 2024 12:32), Max: 36.8 (07 Oct 2024 20:48)  T(F): 97.4 (08 Oct 2024 12:32), Max: 98.2 (07 Oct 2024 20:48)  HR: 57 (08 Oct 2024 12:32) (55 - 65)  BP: 132/67 (08 Oct 2024 12:32) (119/79 - 136/75)  BP(mean): --  RR: 18 (08 Oct 2024 12:32) (17 - 18)  SpO2: 96% (08 Oct 2024 12:32) (94% - 97%)    Parameters below as of 08 Oct 2024 12:32  Patient On (Oxygen Delivery Method): room air      GENERAL: NAD  HEAD:  Atraumatic, Normocephalic  EYES: EOMI,  conjunctiva and sclera clear  NECK: Supple  CHEST/LUNG: Clear to auscultation bilaterally; No wheeze, crackles or rhonchi   HEART: Regular rate and rhythm; Normal S1 S2, No murmurs, rubs, or gallops  ABDOMEN: Soft, Nontender, Nondistended; Bowel sounds present  EXTREMITIES:  2+ Peripheral Pulses, No edema  PSYCH: Awake and alert   NEUROLOGY: SROM  SKIN: Warm and dry      LABS:                        9.8    10.22 )-----------( 213      ( 08 Oct 2024 06:43 )             27.9     10-08    137  |  102  |  88[H]  ----------------------------<  121[H]  3.8   |  21[L]  |  2.68[H]    Ca    7.7[L]      08 Oct 2024 06:43  Phos  4.8     10-08  Mg     2.30     10-08            Urinalysis Basic - ( 08 Oct 2024 06:43 )    Color: x / Appearance: x / SG: x / pH: x  Gluc: 121 mg/dL / Ketone: x  / Bili: x / Urobili: x   Blood: x / Protein: x / Nitrite: x   Leuk Esterase: x / RBC: x / WBC x   Sq Epi: x / Non Sq Epi: x / Bacteria: x        Culture - Blood (collected 06 Oct 2024 05:09)  Source: .Blood BLOOD  Preliminary Report (08 Oct 2024 11:01):    No growth at 48 Hours    Culture - Blood (collected 06 Oct 2024 05:08)  Source: .Blood BLOOD  Preliminary Report (08 Oct 2024 11:01):    No growth at 48 Hours        RADIOLOGY & ADDITIONAL TESTS:  Results Reviewed:   Imaging Personally Reviewed:  Electrocardiogram Personally Reviewed:    COORDINATION OF CARE:  Care Discussed with Consultants/Other Providers [Y/N]:  Prior or Outpatient Records Reviewed [Y/N]:  
Patient is a 69y old  Female who presents with a chief complaint of sepsis 2/2 UTI, maren on ckd, hyponatremia, hypocalcemia (10 Oct 2024 12:40)      SUBJECTIVE / OVERNIGHT EVENTS: No acute events overnight. Pt has no new complaints     ADDITIONAL REVIEW OF SYSTEMS:    MEDICATIONS  (STANDING):  chlorhexidine 2% Cloths 1 Application(s) Topical daily  cholecalciferol 2000 Unit(s) Oral daily  dextrose 5%. 1000 milliLiter(s) (50 mL/Hr) IV Continuous <Continuous>  dextrose 5%. 1000 milliLiter(s) (100 mL/Hr) IV Continuous <Continuous>  dextrose 50% Injectable 25 Gram(s) IV Push once  dextrose 50% Injectable 12.5 Gram(s) IV Push once  dextrose 50% Injectable 25 Gram(s) IV Push once  glucagon  Injectable 1 milliGRAM(s) IntraMuscular once  heparin   Injectable 5000 Unit(s) SubCutaneous every 8 hours  insulin lispro (ADMELOG) corrective regimen sliding scale   SubCutaneous at bedtime  insulin lispro (ADMELOG) corrective regimen sliding scale   SubCutaneous three times a day before meals  lactated ringers. 1000 milliLiter(s) (75 mL/Hr) IV Continuous <Continuous>  levothyroxine 50 MICROGram(s) Oral daily  magnesium oxide 400 milliGRAM(s) Oral daily  meropenem  IVPB 500 milliGRAM(s) IV Intermittent every 12 hours  mupirocin 2% Ointment 1 Application(s) Topical two times a day  pantoprazole    Tablet 40 milliGRAM(s) Oral before breakfast  polyethylene glycol 3350 17 Gram(s) Oral daily    MEDICATIONS  (PRN):  acetaminophen     Tablet .. 650 milliGRAM(s) Oral every 6 hours PRN Temp greater or equal to 38C (100.4F), Mild Pain (1 - 3)  aluminum hydroxide/magnesium hydroxide/simethicone Suspension 30 milliLiter(s) Oral every 4 hours PRN Dyspepsia  dextrose Oral Gel 15 Gram(s) Oral once PRN Blood Glucose LESS THAN 70 milliGRAM(s)/deciliter  melatonin 3 milliGRAM(s) Oral at bedtime PRN Insomnia  ondansetron Injectable 4 milliGRAM(s) IV Push every 8 hours PRN Nausea and/or Vomiting      CAPILLARY BLOOD GLUCOSE      POCT Blood Glucose.: 104 mg/dL (10 Oct 2024 12:51)  POCT Blood Glucose.: 112 mg/dL (10 Oct 2024 08:54)  POCT Blood Glucose.: 151 mg/dL (09 Oct 2024 22:04)  POCT Blood Glucose.: 124 mg/dL (09 Oct 2024 17:58)    I&O's Summary    09 Oct 2024 07:01  -  10 Oct 2024 07:00  --------------------------------------------------------  IN: 920 mL / OUT: 0 mL / NET: 920 mL    10 Oct 2024 07:01  -  10 Oct 2024 16:24  --------------------------------------------------------  IN: 680 mL / OUT: 0 mL / NET: 680 mL        PHYSICAL EXAM:  Vital Signs Last 24 Hrs  T(C): 36.9 (10 Oct 2024 13:00), Max: 37.2 (10 Oct 2024 05:00)  T(F): 98.4 (10 Oct 2024 13:00), Max: 98.9 (10 Oct 2024 05:00)  HR: 70 (10 Oct 2024 13:00) (59 - 70)  BP: 121/63 (10 Oct 2024 13:00) (121/63 - 141/69)  BP(mean): --  RR: 18 (10 Oct 2024 13:00) (17 - 18)  SpO2: 95% (10 Oct 2024 13:00) (95% - 95%)    Parameters below as of 10 Oct 2024 13:00  Patient On (Oxygen Delivery Method): room air      GENERAL: NAD  HEAD:  Atraumatic, Normocephalic  EYES: EOMI,  conjunctiva and sclera clear  NECK: Supple  CHEST/LUNG: Clear to auscultation bilaterally; No wheeze, crackles or rhonchi   HEART: Regular rate and rhythm; Normal S1 S2, No murmurs, rubs, or gallops  ABDOMEN: Soft, Nontender, Nondistended; Bowel sounds present  EXTREMITIES:  2+ Peripheral Pulses, No edema  PSYCH: Awake and alert   NEUROLOGY: SROM  SKIN: Warm and dry    LABS:                        9.2    11.42 )-----------( 223      ( 10 Oct 2024 05:46 )             26.9     10-10    140  |  106  |  68[H]  ----------------------------<  88  4.1   |  24  |  2.00[H]    Ca    8.0[L]      10 Oct 2024 05:46  Phos  3.3     10-10  Mg     2.10     10-10            Urinalysis Basic - ( 10 Oct 2024 05:46 )    Color: x / Appearance: x / SG: x / pH: x  Gluc: 88 mg/dL / Ketone: x  / Bili: x / Urobili: x   Blood: x / Protein: x / Nitrite: x   Leuk Esterase: x / RBC: x / WBC x   Sq Epi: x / Non Sq Epi: x / Bacteria: x          RADIOLOGY & ADDITIONAL TESTS:  Results Reviewed:   Imaging Personally Reviewed:  Electrocardiogram Personally Reviewed:    COORDINATION OF CARE:  Care Discussed with Consultants/Other Providers [Y/N]:  Prior or Outpatient Records Reviewed [Y/N]:  
Patient is a 69y old  Female who presents with a chief complaint of sepsis 2/2 UTI, maren on ckd, hyponatremia, hypocalcemia (13 Oct 2024 13:43)    Being followed by ID for    Interval history:  Abdomen soft   Patient only report lethargy  No fevers, no chills   WBC stable   BCX from 10/11-- NGTD    ROS:  No cough,SOB,CP  No N/V/D  No abd pain  No urinary complaints  No HA  No joint or limb pain  No other complaints    Antimicrobials:  ertapenem  IVPB 500 milliGRAM(s) IV Intermittent every 24 hours      Vital Signs Last 24 Hrs  T(C): 36.7 (10-13-24 @ 12:06), Max: 37.2 (10-12-24 @ 20:35)  T(F): 98 (10-13-24 @ 12:06), Max: 98.9 (10-12-24 @ 20:35)  HR: 82 (10-13-24 @ 12:06) (78 - 86)  BP: 122/74 (10-13-24 @ 12:06) (122/74 - 150/76)  BP(mean): --  RR: 18 (10-13-24 @ 12:06) (17 - 18)  SpO2: 95% (10-13-24 @ 12:06) (95% - 96%)    Physical Exam:  General: Patient in no acute distress   HEENT: NCAT, EOMI, PERRL, no oral lesions  CV: S1+S2, no m/r/g appreciated   Lungs: No respiratory distress, crackles LLL  Abd: Soft, nontender, no guarding, no CVAT  Ext: No cyanosis, no edema  Neuro: Alert and oriented  Skin: No rash   IV: No phlebitis      Lab Data:                        9.9    9.50  )-----------( 259      ( 13 Oct 2024 07:20 )             29.8     10-13    137  |  103  |  45[H]  ----------------------------<  96  3.9   |  22  |  1.77[H]    Ca    8.4      13 Oct 2024 07:20  Phos  3.8     10-13  Mg     1.80     10-13        .Blood BLOOD  10-11-24   No growth at 24 hours  --  --      .Blood BLOOD  10-11-24   No growth at 24 hours  --  --          RADIOLOGY:  below reviewed        ACC: [id number] EXAM:  US KIDNEYS AND BLADDER   ORDERED BY: DARIEL SOLIS     PROCEDURE DATE:  10/05/2024          INTERPRETATION:  CLINICAL INFORMATION: Acute kidney injury.    COMPARISON: None available.    TECHNIQUE: Sonography of the kidneys and bladder.    FINDINGS:  Right kidney: 9.1 cm. No renal mass, hydronephrosis or calculi.    Left kidney: 9.6 cm. No renal mass, hydronephrosis or calculi.    Urinary bladder: Within normal limits.    IMPRESSION:  Normal renal ultrasound.    --- End of Report ---  
[facility] of [facility] Medicine  Marshall Peterson DO  Available via MS Teams    SUBJECTIVE / OVERNIGHT EVENTS:  Spoke to her in [facility]   Resting in bed  States she slept better last night  Understands plans for DC tomorrow     ADDITIONAL REVIEW OF SYSTEMS:    MEDICATIONS  (STANDING):  chlorhexidine 2% Cloths 1 Application(s) Topical daily  cholecalciferol 2000 Unit(s) Oral daily  dextrose 5%. 1000 milliLiter(s) (50 mL/Hr) IV Continuous <Continuous>  dextrose 5%. 1000 milliLiter(s) (100 mL/Hr) IV Continuous <Continuous>  dextrose 50% Injectable 25 Gram(s) IV Push once  dextrose 50% Injectable 25 Gram(s) IV Push once  dextrose 50% Injectable 12.5 Gram(s) IV Push once  ertapenem  IVPB 500 milliGRAM(s) IV Intermittent every 24 hours  glucagon  Injectable 1 milliGRAM(s) IntraMuscular once  heparin   Injectable 5000 Unit(s) SubCutaneous every 8 hours  insulin lispro (ADMELOG) corrective regimen sliding scale   SubCutaneous three times a day before meals  insulin lispro (ADMELOG) corrective regimen sliding scale   SubCutaneous at bedtime  lactated ringers. 1000 milliLiter(s) (75 mL/Hr) IV Continuous <Continuous>  lactated ringers. 1000 milliLiter(s) (75 mL/Hr) IV Continuous <Continuous>  levothyroxine 50 MICROGram(s) Oral daily  magnesium oxide 400 milliGRAM(s) Oral daily  Nephro-kyler 1 Tablet(s) Oral daily  pantoprazole    Tablet 40 milliGRAM(s) Oral before breakfast  polyethylene glycol 3350 17 Gram(s) Oral daily  prednisoLONE Syrup 3 mG/mL (PRELONE) 2.5 milliGRAM(s) Oral every 24 hours  sodium chloride 0.45%. 1000 milliLiter(s) (75 mL/Hr) IV Continuous <Continuous>    MEDICATIONS  (PRN):  acetaminophen     Tablet .. 650 milliGRAM(s) Oral every 6 hours PRN Temp greater or equal to 38C (100.4F), Mild Pain (1 - 3)  aluminum hydroxide/magnesium hydroxide/simethicone Suspension 30 milliLiter(s) Oral every 4 hours PRN Dyspepsia  dextrose Oral Gel 15 Gram(s) Oral once PRN Blood Glucose LESS THAN 70 milliGRAM(s)/deciliter  melatonin 3 milliGRAM(s) Oral at bedtime PRN Insomnia  ondansetron Injectable 4 milliGRAM(s) IV Push every 8 hours PRN Nausea and/or Vomiting      I&O's Summary      PHYSICAL EXAM:  Vital Signs Last 24 Hrs  T(C): 36.8 (15 Oct 2024 06:29), Max: 36.8 (14 Oct 2024 20:15)  T(F): 98.2 (15 Oct 2024 06:29), Max: 98.2 (14 Oct 2024 20:15)  HR: 74 (15 Oct 2024 06:29) (74 - 75)  BP: 133/88 (15 Oct 2024 06:29) (109/62 - 133/88)  BP(mean): --  RR: 18 (15 Oct 2024 06:29) (17 - 18)  SpO2: 96% (15 Oct 2024 06:29) (96% - 96%)    Parameters below as of 15 Oct 2024 06:29  Patient On (Oxygen Delivery Method): room air      CONSTITUTIONAL: NAD  EYES:  non-icteric  RESP: No respiratory distress, no use of accessory muscles  CV: RRR  GI: Soft, NT, ND, no rebound, no guarding  NEURO: No focal deficits     LABS:                        10.7   7.24  )-----------( 292      ( 15 Oct 2024 05:45 )             31.6     10-15    136  |  103  |  62[H]  ----------------------------<  79  4.3   |  18[L]  |  2.06[H]    Ca    8.8      15 Oct 2024 05:45  Phos  5.2     10-15  Mg     2.20     10-15            Urinalysis Basic - ( 15 Oct 2024 05:45 )    Color: x / Appearance: x / SG: x / pH: x  Gluc: 79 mg/dL / Ketone: x  / Bili: x / Urobili: x   Blood: x / Protein: x / Nitrite: x   Leuk Esterase: x / RBC: x / WBC x   Sq Epi: x / Non Sq Epi: x / Bacteria: x            RADIOLOGY & ADDITIONAL TESTS:  New Imaging Personally Reviewed Today: yes  New Electrocardiogram Personally Reviewed Today: yes  Other Results Reviewed Today: yes  Prior or Outpatient Records Reviewed Today with Summary: yes    COORDINATION OF CARE:  Consultant Communication and Details of Discussion (where applicable):    
Mery Valadez        Patient is a 69y old  Female who presents with a chief complaint of sepsis 2/2 UTI, maren on ckd, hyponatremia, hypocalcemia (05 Oct 2024 15:23)      SUBJECTIVE / OVERNIGHT EVENTS: No acute overnight events. This morning pt doing well. No complaints this morning. Plan discussed w/ patient and son (over the phone)    MEDICATIONS  (STANDING):  chlorhexidine 2% Cloths 1 Application(s) Topical daily  dextrose 5%. 1000 milliLiter(s) (50 mL/Hr) IV Continuous <Continuous>  dextrose 5%. 1000 milliLiter(s) (100 mL/Hr) IV Continuous <Continuous>  dextrose 50% Injectable 25 Gram(s) IV Push once  dextrose 50% Injectable 12.5 Gram(s) IV Push once  dextrose 50% Injectable 25 Gram(s) IV Push once  glucagon  Injectable 1 milliGRAM(s) IntraMuscular once  heparin   Injectable 5000 Unit(s) SubCutaneous every 8 hours  hydrocortisone sodium succinate Injectable 50 milliGRAM(s) IV Push every 6 hours  insulin lispro (ADMELOG) corrective regimen sliding scale   SubCutaneous three times a day before meals  insulin lispro (ADMELOG) corrective regimen sliding scale   SubCutaneous at bedtime  levothyroxine 50 MICROGram(s) Oral daily  magnesium oxide 400 milliGRAM(s) Oral daily  meropenem  IVPB 500 milliGRAM(s) IV Intermittent every 12 hours  pantoprazole    Tablet 40 milliGRAM(s) Oral before breakfast  polyethylene glycol 3350 17 Gram(s) Oral daily  sodium bicarbonate  Infusion 0.11 mEq/kG/Hr (75 mL/Hr) IV Continuous <Continuous>    MEDICATIONS  (PRN):  acetaminophen     Tablet .. 650 milliGRAM(s) Oral every 6 hours PRN Temp greater or equal to 38C (100.4F), Mild Pain (1 - 3)  aluminum hydroxide/magnesium hydroxide/simethicone Suspension 30 milliLiter(s) Oral every 4 hours PRN Dyspepsia  dextrose Oral Gel 15 Gram(s) Oral once PRN Blood Glucose LESS THAN 70 milliGRAM(s)/deciliter  melatonin 3 milliGRAM(s) Oral at bedtime PRN Insomnia  ondansetron Injectable 4 milliGRAM(s) IV Push every 8 hours PRN Nausea and/or Vomiting    Allergies    cefixime (Rash; Urticaria; Hives)  ciprofloxacin (Other)    Intolerances        Vital Signs Last 24 Hrs  T(C): 36.5 (05 Oct 2024 12:23), Max: 38.6 (04 Oct 2024 16:48)  T(F): 97.7 (05 Oct 2024 12:23), Max: 101.5 (04 Oct 2024 16:48)  HR: 63 (05 Oct 2024 12:23) (56 - 112)  BP: 101/63 (05 Oct 2024 12:23) (84/45 - 104/53)  BP(mean): 70 (05 Oct 2024 00:20) (58 - 70)  RR: 18 (05 Oct 2024 12:23) (17 - 24)  SpO2: 97% (05 Oct 2024 12:23) (94% - 100%)    Parameters below as of 05 Oct 2024 12:23  Patient On (Oxygen Delivery Method): room air      Daily     Daily   CAPILLARY BLOOD GLUCOSE      POCT Blood Glucose.: 200 mg/dL (05 Oct 2024 12:37)  POCT Blood Glucose.: 157 mg/dL (05 Oct 2024 08:31)  POCT Blood Glucose.: 186 mg/dL (05 Oct 2024 02:04)  POCT Blood Glucose.: 176 mg/dL (05 Oct 2024 01:24)    I&O's Summary      PHYSICAL EXAM:  GENERAL: NAD  HEAD:  Atraumatic, Normocephalic  EYES: EOMI,  conjunctiva and sclera clear  NECK: Supple  CHEST/LUNG: Clear to auscultation bilaterally; No wheeze, crackles or rhonchi   HEART: Regular rate and rhythm; Normal S1 S2, No murmurs, rubs, or gallops  ABDOMEN: Soft, Nontender, Nondistended; Bowel sounds present  EXTREMITIES:  2+ Peripheral Pulses, No edema  PSYCH: Awake and alert   NEUROLOGY: SROM  SKIN: Warm and dry    LABS:                        9.7    22.02 )-----------( 141      ( 05 Oct 2024 06:45 )             28.4     Hgb Trend: 9.7<--, 10.0<--  10-05    134[L]  |  104  |  70[H]  ----------------------------<  152[H]  4.0   |  16[L]  |  3.54[H]    Ca    7.3[L]      05 Oct 2024 06:45  Phos  2.5     10-04  Mg     2.10     10-04    TPro  6.0  /  Alb  3.1[L]  /  TBili  0.4  /  DBili  x   /  AST  22  /  ALT  17  /  AlkPhos  86  10-04    Creatinine Trend: 3.54<--, 3.30<--, 3.37<--  LIVER FUNCTIONS - ( 04 Oct 2024 12:45 )  Alb: 3.1 g/dL / Pro: 6.0 g/dL / ALK PHOS: 86 U/L / ALT: 17 U/L / AST: 22 U/L / GGT: x           PT/INR - ( 04 Oct 2024 12:45 )   PT: 13.8 sec;   INR: 1.19 ratio         PTT - ( 04 Oct 2024 12:45 )  PTT:33.3 sec      Urinalysis Basic - ( 05 Oct 2024 06:45 )    Color: x / Appearance: x / SG: x / pH: x  Gluc: 152 mg/dL / Ketone: x  / Bili: x / Urobili: x   Blood: x / Protein: x / Nitrite: x   Leuk Esterase: x / RBC: x / WBC x   Sq Epi: x / Non Sq Epi: x / Bacteria: x        RADIOLOGY & ADDITIONAL TESTS:    Imaging Personally Reviewed.    Consultant(s) Notes Reviewed.    Care Discussed with Consultants/Other Providers.      
Patient is a 69y old  Female who presents with a chief complaint of sepsis 2/2 UTI, maren on ckd, hyponatremia, hypocalcemia (12 Oct 2024 14:49)      SUBJECTIVE / OVERNIGHT EVENTS: No acute events overnight. Pt has no new complaints     ADDITIONAL REVIEW OF SYSTEMS:    MEDICATIONS  (STANDING):  chlorhexidine 2% Cloths 1 Application(s) Topical daily  cholecalciferol 2000 Unit(s) Oral daily  dextrose 5%. 1000 milliLiter(s) (50 mL/Hr) IV Continuous <Continuous>  dextrose 5%. 1000 milliLiter(s) (100 mL/Hr) IV Continuous <Continuous>  dextrose 50% Injectable 25 Gram(s) IV Push once  dextrose 50% Injectable 12.5 Gram(s) IV Push once  dextrose 50% Injectable 25 Gram(s) IV Push once  glucagon  Injectable 1 milliGRAM(s) IntraMuscular once  heparin   Injectable 5000 Unit(s) SubCutaneous every 8 hours  insulin lispro (ADMELOG) corrective regimen sliding scale   SubCutaneous at bedtime  insulin lispro (ADMELOG) corrective regimen sliding scale   SubCutaneous three times a day before meals  lactated ringers. 1000 milliLiter(s) (75 mL/Hr) IV Continuous <Continuous>  lactated ringers. 1000 milliLiter(s) (75 mL/Hr) IV Continuous <Continuous>  levothyroxine 50 MICROGram(s) Oral daily  magnesium oxide 400 milliGRAM(s) Oral daily  meropenem  IVPB 500 milliGRAM(s) IV Intermittent every 12 hours  Nephro-kyler 1 Tablet(s) Oral daily  pantoprazole    Tablet 40 milliGRAM(s) Oral before breakfast  polyethylene glycol 3350 17 Gram(s) Oral daily  prednisoLONE Syrup 3 mG/mL (PRELONE) 2.5 milliGRAM(s) Oral every 24 hours    MEDICATIONS  (PRN):  acetaminophen     Tablet .. 650 milliGRAM(s) Oral every 6 hours PRN Temp greater or equal to 38C (100.4F), Mild Pain (1 - 3)  aluminum hydroxide/magnesium hydroxide/simethicone Suspension 30 milliLiter(s) Oral every 4 hours PRN Dyspepsia  dextrose Oral Gel 15 Gram(s) Oral once PRN Blood Glucose LESS THAN 70 milliGRAM(s)/deciliter  melatonin 3 milliGRAM(s) Oral at bedtime PRN Insomnia  ondansetron Injectable 4 milliGRAM(s) IV Push every 8 hours PRN Nausea and/or Vomiting      CAPILLARY BLOOD GLUCOSE      POCT Blood Glucose.: 103 mg/dL (12 Oct 2024 12:42)  POCT Blood Glucose.: 102 mg/dL (12 Oct 2024 08:32)  POCT Blood Glucose.: 123 mg/dL (11 Oct 2024 22:10)  POCT Blood Glucose.: 90 mg/dL (11 Oct 2024 17:47)    I&O's Summary    11 Oct 2024 07:01  -  12 Oct 2024 07:00  --------------------------------------------------------  IN: 1050 mL / OUT: 0 mL / NET: 1050 mL        PHYSICAL EXAM:  Vital Signs Last 24 Hrs  T(C): 37 (12 Oct 2024 13:07), Max: 37 (12 Oct 2024 13:07)  T(F): 98.6 (12 Oct 2024 13:07), Max: 98.6 (12 Oct 2024 13:07)  HR: 72 (12 Oct 2024 13:07) (72 - 91)  BP: 132/71 (12 Oct 2024 13:07) (132/71 - 137/71)  BP(mean): --  RR: 18 (12 Oct 2024 13:07) (17 - 18)  SpO2: 94% (12 Oct 2024 13:07) (94% - 97%)    Parameters below as of 12 Oct 2024 13:07  Patient On (Oxygen Delivery Method): room air      GENERAL: NAD  HEAD:  Atraumatic, Normocephalic  EYES: EOMI,  conjunctiva and sclera clear  NECK: Supple  CHEST/LUNG: Clear to auscultation bilaterally; No wheeze, crackles or rhonchi   HEART: Regular rate and rhythm; Normal S1 S2, No murmurs, rubs, or gallops  ABDOMEN: Soft, Nontender, Nondistended; Bowel sounds present  EXTREMITIES:  2+ Peripheral Pulses, No edema  PSYCH: Awake and alert   NEUROLOGY: SROM  SKIN: Warm and dry    LABS:                        9.1    9.07  )-----------( 249      ( 12 Oct 2024 05:38 )             26.6     10-12    139  |  102  |  50[H]  ----------------------------<  80  4.2   |  22  |  1.95[H]    Ca    8.1[L]      12 Oct 2024 05:38  Phos  3.6     10-12  Mg     1.70     10-12            Urinalysis Basic - ( 12 Oct 2024 05:38 )    Color: x / Appearance: x / SG: x / pH: x  Gluc: 80 mg/dL / Ketone: x  / Bili: x / Urobili: x   Blood: x / Protein: x / Nitrite: x   Leuk Esterase: x / RBC: x / WBC x   Sq Epi: x / Non Sq Epi: x / Bacteria: x          RADIOLOGY & ADDITIONAL TESTS:  Results Reviewed:   Imaging Personally Reviewed:  Electrocardiogram Personally Reviewed:    COORDINATION OF CARE:  Care Discussed with Consultants/Other Providers [Y/N]:  Prior or Outpatient Records Reviewed [Y/N]:  
Patient is a 69y old  Female who presents with a chief complaint of sepsis 2/2 UTI, maren on ckd, hyponatremia, hypocalcemia (08 Oct 2024 16:04)      SUBJECTIVE / OVERNIGHT EVENTS: No acute events overnight. Pt has     ADDITIONAL REVIEW OF SYSTEMS:    MEDICATIONS  (STANDING):  chlorhexidine 2% Cloths 1 Application(s) Topical daily  cholecalciferol 2000 Unit(s) Oral daily  dextrose 5% 1000 milliLiter(s) (75 mL/Hr) IV Continuous <Continuous>  dextrose 5%. 1000 milliLiter(s) (100 mL/Hr) IV Continuous <Continuous>  dextrose 5%. 1000 milliLiter(s) (50 mL/Hr) IV Continuous <Continuous>  dextrose 50% Injectable 25 Gram(s) IV Push once  dextrose 50% Injectable 25 Gram(s) IV Push once  dextrose 50% Injectable 12.5 Gram(s) IV Push once  glucagon  Injectable 1 milliGRAM(s) IntraMuscular once  heparin   Injectable 5000 Unit(s) SubCutaneous every 8 hours  hydrocortisone sodium succinate Injectable 10 milliGRAM(s) IV Push every 12 hours  insulin lispro (ADMELOG) corrective regimen sliding scale   SubCutaneous at bedtime  insulin lispro (ADMELOG) corrective regimen sliding scale   SubCutaneous three times a day before meals  levothyroxine 50 MICROGram(s) Oral daily  magnesium oxide 400 milliGRAM(s) Oral daily  meropenem  IVPB 500 milliGRAM(s) IV Intermittent every 12 hours  mupirocin 2% Ointment 1 Application(s) Topical two times a day  pantoprazole    Tablet 40 milliGRAM(s) Oral before breakfast  polyethylene glycol 3350 17 Gram(s) Oral daily  sodium bicarbonate  Infusion 0.11 mEq/kG/Hr (75 mL/Hr) IV Continuous <Continuous>    MEDICATIONS  (PRN):  acetaminophen     Tablet .. 650 milliGRAM(s) Oral every 6 hours PRN Temp greater or equal to 38C (100.4F), Mild Pain (1 - 3)  aluminum hydroxide/magnesium hydroxide/simethicone Suspension 30 milliLiter(s) Oral every 4 hours PRN Dyspepsia  dextrose Oral Gel 15 Gram(s) Oral once PRN Blood Glucose LESS THAN 70 milliGRAM(s)/deciliter  melatonin 3 milliGRAM(s) Oral at bedtime PRN Insomnia  ondansetron Injectable 4 milliGRAM(s) IV Push every 8 hours PRN Nausea and/or Vomiting      CAPILLARY BLOOD GLUCOSE      POCT Blood Glucose.: 162 mg/dL (09 Oct 2024 12:38)  POCT Blood Glucose.: 125 mg/dL (09 Oct 2024 08:29)  POCT Blood Glucose.: 199 mg/dL (08 Oct 2024 22:00)  POCT Blood Glucose.: 185 mg/dL (08 Oct 2024 17:56)    I&O's Summary    09 Oct 2024 07:01  -  09 Oct 2024 14:52  --------------------------------------------------------  IN: 720 mL / OUT: 0 mL / NET: 720 mL        PHYSICAL EXAM:  Vital Signs Last 24 Hrs  T(C): 36.5 (09 Oct 2024 12:59), Max: 36.7 (08 Oct 2024 19:32)  T(F): 97.7 (09 Oct 2024 12:59), Max: 98.1 (09 Oct 2024 01:11)  HR: 78 (09 Oct 2024 12:59) (56 - 78)  BP: 115/68 (09 Oct 2024 12:59) (115/68 - 146/76)  BP(mean): --  RR: 18 (09 Oct 2024 12:59) (17 - 18)  SpO2: 98% (09 Oct 2024 12:59) (94% - 98%)    Parameters below as of 09 Oct 2024 12:59  Patient On (Oxygen Delivery Method): room air      GENERAL: NAD  HEAD:  Atraumatic, Normocephalic  EYES: EOMI,  conjunctiva and sclera clear  NECK: Supple  CHEST/LUNG: Clear to auscultation bilaterally; No wheeze, crackles or rhonchi   HEART: Regular rate and rhythm; Normal S1 S2, No murmurs, rubs, or gallops  ABDOMEN: Soft, Nontender, Nondistended; Bowel sounds present  EXTREMITIES:  2+ Peripheral Pulses, No edema  PSYCH: Awake and alert   NEUROLOGY: SROM  SKIN: Warm and dry    LABS:                        9.0    10.58 )-----------( 203      ( 09 Oct 2024 05:25 )             25.5     10-09    138  |  103  |  85[H]  ----------------------------<  149[H]  3.3[L]   |  22  |  2.35[H]    Ca    7.7[L]      09 Oct 2024 05:25  Phos  4.4     10-09  Mg     2.20     10-09            Urinalysis Basic - ( 09 Oct 2024 05:25 )    Color: x / Appearance: x / SG: x / pH: x  Gluc: 149 mg/dL / Ketone: x  / Bili: x / Urobili: x   Blood: x / Protein: x / Nitrite: x   Leuk Esterase: x / RBC: x / WBC x   Sq Epi: x / Non Sq Epi: x / Bacteria: x          RADIOLOGY & ADDITIONAL TESTS:  Results Reviewed:   Imaging Personally Reviewed:  Electrocardiogram Personally Reviewed:    COORDINATION OF CARE:  Care Discussed with Consultants/Other Providers [Y/N]:  Prior or Outpatient Records Reviewed [Y/N]:  
Patient is a 69y old  Female who presents with a chief complaint of sepsis 2/2 UTI, maren on ckd, hyponatremia, hypocalcemia (07 Oct 2024 09:15)      SUBJECTIVE / OVERNIGHT EVENTS:  No acute events overnight. Pt has no new complaints.     ADDITIONAL REVIEW OF SYSTEMS:    MEDICATIONS  (STANDING):  chlorhexidine 2% Cloths 1 Application(s) Topical daily  cholecalciferol 2000 Unit(s) Oral daily  dextrose 5% 1000 milliLiter(s) (75 mL/Hr) IV Continuous <Continuous>  dextrose 5%. 1000 milliLiter(s) (50 mL/Hr) IV Continuous <Continuous>  dextrose 5%. 1000 milliLiter(s) (100 mL/Hr) IV Continuous <Continuous>  dextrose 50% Injectable 25 Gram(s) IV Push once  dextrose 50% Injectable 12.5 Gram(s) IV Push once  dextrose 50% Injectable 25 Gram(s) IV Push once  glucagon  Injectable 1 milliGRAM(s) IntraMuscular once  heparin   Injectable 5000 Unit(s) SubCutaneous every 8 hours  hydrocortisone sodium succinate Injectable 50 milliGRAM(s) IV Push every 6 hours  insulin lispro (ADMELOG) corrective regimen sliding scale   SubCutaneous three times a day before meals  insulin lispro (ADMELOG) corrective regimen sliding scale   SubCutaneous at bedtime  levothyroxine 50 MICROGram(s) Oral daily  magnesium oxide 400 milliGRAM(s) Oral daily  meropenem  IVPB 500 milliGRAM(s) IV Intermittent every 12 hours  mupirocin 2% Ointment 1 Application(s) Topical two times a day  pantoprazole    Tablet 40 milliGRAM(s) Oral before breakfast  polyethylene glycol 3350 17 Gram(s) Oral daily  sodium bicarbonate  Infusion 0.11 mEq/kG/Hr (75 mL/Hr) IV Continuous <Continuous>    MEDICATIONS  (PRN):  acetaminophen     Tablet .. 650 milliGRAM(s) Oral every 6 hours PRN Temp greater or equal to 38C (100.4F), Mild Pain (1 - 3)  aluminum hydroxide/magnesium hydroxide/simethicone Suspension 30 milliLiter(s) Oral every 4 hours PRN Dyspepsia  dextrose Oral Gel 15 Gram(s) Oral once PRN Blood Glucose LESS THAN 70 milliGRAM(s)/deciliter  melatonin 3 milliGRAM(s) Oral at bedtime PRN Insomnia  ondansetron Injectable 4 milliGRAM(s) IV Push every 8 hours PRN Nausea and/or Vomiting      CAPILLARY BLOOD GLUCOSE      POCT Blood Glucose.: 177 mg/dL (07 Oct 2024 12:19)  POCT Blood Glucose.: 119 mg/dL (07 Oct 2024 08:44)  POCT Blood Glucose.: 149 mg/dL (06 Oct 2024 22:00)  POCT Blood Glucose.: 144 mg/dL (06 Oct 2024 17:39)    I&O's Summary      PHYSICAL EXAM:  Vital Signs Last 24 Hrs  T(C): 36.6 (07 Oct 2024 09:10), Max: 36.6 (06 Oct 2024 22:23)  T(F): 97.8 (07 Oct 2024 09:10), Max: 97.8 (06 Oct 2024 22:23)  HR: 57 (07 Oct 2024 09:10) (57 - 60)  BP: 126/65 (07 Oct 2024 09:10) (115/59 - 126/65)  BP(mean): --  RR: 17 (07 Oct 2024 09:10) (17 - 18)  SpO2: 96% (07 Oct 2024 09:10) (95% - 96%)    Parameters below as of 07 Oct 2024 09:10  Patient On (Oxygen Delivery Method): room air      CONSTITUTIONAL: NAD  EYES: PERRLA; conjunctiva and sclera clear  ENMT: Moist oral mucosa, no pharyngeal injection or exudates; normal dentition  NECK: Supple, no palpable masses; no thyromegaly  RESPIRATORY: Normal respiratory effort; lungs are clear to auscultation bilaterally  CARDIOVASCULAR: Regular rate and rhythm, normal S1 and S2, no murmur/rub/gallop; No lower extremity edema; Peripheral pulses are 2+ bilaterally  ABDOMEN: Nontender to palpation, normoactive bowel sounds, no rebound/guarding; No hepatosplenomegaly  MUSCULOSKELETAL:  Normal gait; no clubbing or cyanosis of digits; no joint swelling or tenderness to palpation  PSYCH: A+O to person, place, and time; affect appropriate  NEUROLOGY: CN 2-12 are intact and symmetric; no gross sensory deficits   SKIN: No rashes; no palpable lesions    LABS:                        9.7    14.30 )-----------( 178      ( 07 Oct 2024 06:00 )             27.0     10-07    132[L]  |  101  |  84[H]  ----------------------------<  150[H]  4.3   |  15[L]  |  2.86[H]    Ca    7.8[L]      07 Oct 2024 06:00  Phos  4.9     10-07  Mg     2.50     10-07            Urinalysis Basic - ( 07 Oct 2024 06:00 )    Color: x / Appearance: x / SG: x / pH: x  Gluc: 150 mg/dL / Ketone: x  / Bili: x / Urobili: x   Blood: x / Protein: x / Nitrite: x   Leuk Esterase: x / RBC: x / WBC x   Sq Epi: x / Non Sq Epi: x / Bacteria: x        Culture - Blood (collected 06 Oct 2024 05:09)  Source: .Blood BLOOD  Preliminary Report (07 Oct 2024 11:01):    No growth at 24 hours    Culture - Blood (collected 06 Oct 2024 05:08)  Source: .Blood BLOOD  Preliminary Report (07 Oct 2024 11:01):    No growth at 24 hours        RADIOLOGY & ADDITIONAL TESTS:  Results Reviewed:   Imaging Personally Reviewed:  Electrocardiogram Personally Reviewed:    COORDINATION OF CARE:  Care Discussed with Consultants/Other Providers [Y/N]:  Prior or Outpatient Records Reviewed [Y/N]:  
[facility] Division of Hospital Medicine  Marshall Peterson DO  Available via MS Teams    SUBJECTIVE / OVERNIGHT EVENTS:  Spoke in Greenlandic   Son on phone  States she has been trying to drink water  Requesting to shower     ADDITIONAL REVIEW OF SYSTEMS:    MEDICATIONS  (STANDING):  chlorhexidine 2% Cloths 1 Application(s) Topical daily  cholecalciferol 2000 Unit(s) Oral daily  dextrose 5%. 1000 milliLiter(s) (50 mL/Hr) IV Continuous <Continuous>  dextrose 5%. 1000 milliLiter(s) (100 mL/Hr) IV Continuous <Continuous>  dextrose 50% Injectable 25 Gram(s) IV Push once  dextrose 50% Injectable 25 Gram(s) IV Push once  dextrose 50% Injectable 12.5 Gram(s) IV Push once  ertapenem  IVPB 500 milliGRAM(s) IV Intermittent every 24 hours  famotidine    Tablet 20 milliGRAM(s) Oral daily  glucagon  Injectable 1 milliGRAM(s) IntraMuscular once  heparin   Injectable 5000 Unit(s) SubCutaneous every 8 hours  insulin lispro (ADMELOG) corrective regimen sliding scale   SubCutaneous three times a day before meals  insulin lispro (ADMELOG) corrective regimen sliding scale   SubCutaneous at bedtime  lactated ringers. 1000 milliLiter(s) (75 mL/Hr) IV Continuous <Continuous>  lactated ringers. 1000 milliLiter(s) (75 mL/Hr) IV Continuous <Continuous>  levothyroxine 50 MICROGram(s) Oral daily  magnesium oxide 400 milliGRAM(s) Oral daily  Nephro-kyler 1 Tablet(s) Oral daily  polyethylene glycol 3350 17 Gram(s) Oral daily  prednisoLONE Syrup 3 mG/mL (PRELONE) 2.5 milliGRAM(s) Oral every 24 hours  sodium chloride 0.45% 1000 milliLiter(s) (75 mL/Hr) IV Continuous <Continuous>    MEDICATIONS  (PRN):  acetaminophen     Tablet .. 650 milliGRAM(s) Oral every 6 hours PRN Temp greater or equal to 38C (100.4F), Mild Pain (1 - 3)  aluminum hydroxide/magnesium hydroxide/simethicone Suspension 30 milliLiter(s) Oral every 4 hours PRN Dyspepsia  dextrose Oral Gel 15 Gram(s) Oral once PRN Blood Glucose LESS THAN 70 milliGRAM(s)/deciliter  melatonin 3 milliGRAM(s) Oral at bedtime PRN Insomnia  ondansetron Injectable 4 milliGRAM(s) IV Push every 8 hours PRN Nausea and/or Vomiting      I&O's Summary      PHYSICAL EXAM:  Vital Signs Last 24 Hrs  T(C): 37.2 (16 Oct 2024 14:17), Max: 37.2 (16 Oct 2024 14:17)  T(F): 98.9 (16 Oct 2024 14:17), Max: 98.9 (16 Oct 2024 14:17)  HR: 77 (16 Oct 2024 14:17) (71 - 77)  BP: 125/76 (16 Oct 2024 14:17) (115/73 - 125/76)  BP(mean): --  RR: 18 (16 Oct 2024 14:17) (18 - 18)  SpO2: 96% (16 Oct 2024 14:17) (95% - 97%)    Parameters below as of 16 Oct 2024 14:17  Patient On (Oxygen Delivery Method): room air      CONSTITUTIONAL: NAD  EYES:  non-icteric  NECK: Supple  RESP: No respiratory distress, no use of accessory muscles  CV: RRR  GI: ND, no rebound, no guarding  NEURO: No focal deficits   PSYCH: Appropriate insight/judgment    LABS:                        10.5   8.04  )-----------( 317      ( 16 Oct 2024 05:43 )             30.9     10-16    134[L]  |  99  |  75[H]  ----------------------------<  75  4.5   |  19[L]  |  2.27[H]    Ca    8.9      16 Oct 2024 05:43  Phos  5.3     10-16  Mg     2.20     10-16            Urinalysis Basic - ( 16 Oct 2024 05:43 )    Color: x / Appearance: x / SG: x / pH: x  Gluc: 75 mg/dL / Ketone: x  / Bili: x / Urobili: x   Blood: x / Protein: x / Nitrite: x   Leuk Esterase: x / RBC: x / WBC x   Sq Epi: x / Non Sq Epi: x / Bacteria: x            RADIOLOGY & ADDITIONAL TESTS:  New Imaging Personally Reviewed Today: yes  New Electrocardiogram Personally Reviewed Today: yes  Other Results Reviewed Today: yes  Prior or Outpatient Records Reviewed Today with Summary: yes    COORDINATION OF CARE:  Consultant Communication and Details of Discussion (where applicable):    
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 Also endorsing intermittent SOB per chart   (11 Oct 2024 14:30)      SUBJECTIVE / OVERNIGHT EVENTS: [facility]  # [id number]. No acute events overnight. Pt has no new complaints. Tried calling her son Keiry Conn but now answer.     ADDITIONAL REVIEW OF SYSTEMS:    MEDICATIONS  (STANDING):  chlorhexidine 2% Cloths 1 Application(s) Topical daily  cholecalciferol 2000 Unit(s) Oral daily  dextrose 5%. 1000 milliLiter(s) (50 mL/Hr) IV Continuous <Continuous>  dextrose 5%. 1000 milliLiter(s) (100 mL/Hr) IV Continuous <Continuous>  dextrose 50% Injectable 25 Gram(s) IV Push once  dextrose 50% Injectable 25 Gram(s) IV Push once  dextrose 50% Injectable 12.5 Gram(s) IV Push once  glucagon  Injectable 1 milliGRAM(s) IntraMuscular once  heparin   Injectable 5000 Unit(s) SubCutaneous every 8 hours  insulin lispro (ADMELOG) corrective regimen sliding scale   SubCutaneous three times a day before meals  insulin lispro (ADMELOG) corrective regimen sliding scale   SubCutaneous at bedtime  lactated ringers. 1000 milliLiter(s) (75 mL/Hr) IV Continuous <Continuous>  levothyroxine 50 MICROGram(s) Oral daily  magnesium oxide 400 milliGRAM(s) Oral daily  meropenem  IVPB 500 milliGRAM(s) IV Intermittent every 12 hours  mupirocin 2% Ointment 1 Application(s) Topical two times a day  Nephro-kyler 1 Tablet(s) Oral daily  pantoprazole    Tablet 40 milliGRAM(s) Oral before breakfast  polyethylene glycol 3350 17 Gram(s) Oral daily  prednisoLONE Syrup 3 mG/mL (PRELONE) 2.5 milliGRAM(s) Oral every 24 hours    MEDICATIONS  (PRN):  acetaminophen     Tablet .. 650 milliGRAM(s) Oral every 6 hours PRN Temp greater or equal to 38C (100.4F), Mild Pain (1 - 3)  aluminum hydroxide/magnesium hydroxide/simethicone Suspension 30 milliLiter(s) Oral every 4 hours PRN Dyspepsia  dextrose Oral Gel 15 Gram(s) Oral once PRN Blood Glucose LESS THAN 70 milliGRAM(s)/deciliter  melatonin 3 milliGRAM(s) Oral at bedtime PRN Insomnia  ondansetron Injectable 4 milliGRAM(s) IV Push every 8 hours PRN Nausea and/or Vomiting      CAPILLARY BLOOD GLUCOSE      POCT Blood Glucose.: 86 mg/dL (11 Oct 2024 12:51)  POCT Blood Glucose.: 96 mg/dL (11 Oct 2024 08:44)  POCT Blood Glucose.: 120 mg/dL (10 Oct 2024 21:55)  POCT Blood Glucose.: 117 mg/dL (10 Oct 2024 18:18)    I&O's Summary    10 Oct 2024 07:01  -  11 Oct 2024 07:00  --------------------------------------------------------  IN: 1930 mL / OUT: 0 mL / NET: 1930 mL        PHYSICAL EXAM:  Vital Signs Last 24 Hrs  T(C): 36.7 (11 Oct 2024 12:01), Max: 37.1 (10 Oct 2024 20:58)  T(F): 98.1 (11 Oct 2024 12:01), Max: 98.8 (10 Oct 2024 20:58)  HR: 68 (11 Oct 2024 12:01) (68 - 73)  BP: 129/74 (11 Oct 2024 12:01) (129/74 - 158/81)  BP(mean): --  RR: 17 (11 Oct 2024 12:01) (17 - 18)  SpO2: 97% (11 Oct 2024 12:01) (95% - 97%)    Parameters below as of 11 Oct 2024 12:01  Patient On (Oxygen Delivery Method): room air      CONSTITUTIONAL: NAD  EYES: PERRLA; conjunctiva and sclera clear  ENMT: Moist oral mucosa, no pharyngeal injection or exudates; normal dentition  NECK: Supple, no palpable masses; no thyromegaly  RESPIRATORY: Normal respiratory effort; lungs are clear to auscultation bilaterally  CARDIOVASCULAR: Regular rate and rhythm, normal S1 and S2, no murmur/rub/gallop; No lower extremity edema; Peripheral pulses are 2+ bilaterally  ABDOMEN: Nontender to palpation, normoactive bowel sounds, no rebound/guarding; No hepatosplenomegaly  MUSCULOSKELETAL:  Normal gait; no clubbing or cyanosis of digits; no joint swelling or tenderness to palpation  PSYCH: A+O to person, place, and time; affect appropriate  NEUROLOGY: CN 2-12 are intact and symmetric; no gross sensory deficits   SKIN: No rashes; no palpable lesions    LABS:                        8.9    10.05 )-----------( 229      ( 11 Oct 2024 05:33 )             26.2     10-11    140  |  105  |  56[H]  ----------------------------<  72  4.1   |  23  |  1.93[H]    Ca    7.7[L]      11 Oct 2024 05:33  Phos  3.3     10-11  Mg     2.10     10-11            Urinalysis Basic - ( 11 Oct 2024 05:33 )    Color: x / Appearance: x / SG: x / pH: x  Gluc: 72 mg/dL / Ketone: x  / Bili: x / Urobili: x   Blood: x / Protein: x / Nitrite: x   Leuk Esterase: x / RBC: x / WBC x   Sq Epi: x / Non Sq Epi: x / Bacteria: x          RADIOLOGY & ADDITIONAL TESTS:  Results Reviewed:   Imaging Personally Reviewed:  Electrocardiogram Personally Reviewed:    COORDINATION OF CARE:  Care Discussed with Consultants/Other Providers [Y/N]:  Prior or Outpatient Records Reviewed [Y/N]:  
Patient is a 69y old  Female who presents with a chief complaint of sepsis 2/2 UTI, maren on ckd, hyponatremia, hypocalcemia (12 Oct 2024 15:17)      SUBJECTIVE / OVERNIGHT EVENTS: [facility]  # [id number]. No acute events overnight. Pt has no new complaints     ADDITIONAL REVIEW OF SYSTEMS:    MEDICATIONS  (STANDING):  chlorhexidine 2% Cloths 1 Application(s) Topical daily  cholecalciferol 2000 Unit(s) Oral daily  dextrose 5%. 1000 milliLiter(s) (50 mL/Hr) IV Continuous <Continuous>  dextrose 5%. 1000 milliLiter(s) (100 mL/Hr) IV Continuous <Continuous>  dextrose 50% Injectable 25 Gram(s) IV Push once  dextrose 50% Injectable 25 Gram(s) IV Push once  dextrose 50% Injectable 12.5 Gram(s) IV Push once  ertapenem  IVPB 500 milliGRAM(s) IV Intermittent every 24 hours  glucagon  Injectable 1 milliGRAM(s) IntraMuscular once  heparin   Injectable 5000 Unit(s) SubCutaneous every 8 hours  insulin lispro (ADMELOG) corrective regimen sliding scale   SubCutaneous at bedtime  insulin lispro (ADMELOG) corrective regimen sliding scale   SubCutaneous three times a day before meals  lactated ringers. 1000 milliLiter(s) (75 mL/Hr) IV Continuous <Continuous>  lactated ringers. 1000 milliLiter(s) (75 mL/Hr) IV Continuous <Continuous>  levothyroxine 50 MICROGram(s) Oral daily  magnesium oxide 400 milliGRAM(s) Oral daily  Nephro-kyler 1 Tablet(s) Oral daily  pantoprazole    Tablet 40 milliGRAM(s) Oral before breakfast  polyethylene glycol 3350 17 Gram(s) Oral daily  prednisoLONE Syrup 3 mG/mL (PRELONE) 2.5 milliGRAM(s) Oral every 24 hours    MEDICATIONS  (PRN):  acetaminophen     Tablet .. 650 milliGRAM(s) Oral every 6 hours PRN Temp greater or equal to 38C (100.4F), Mild Pain (1 - 3)  aluminum hydroxide/magnesium hydroxide/simethicone Suspension 30 milliLiter(s) Oral every 4 hours PRN Dyspepsia  dextrose Oral Gel 15 Gram(s) Oral once PRN Blood Glucose LESS THAN 70 milliGRAM(s)/deciliter  melatonin 3 milliGRAM(s) Oral at bedtime PRN Insomnia  ondansetron Injectable 4 milliGRAM(s) IV Push every 8 hours PRN Nausea and/or Vomiting      CAPILLARY BLOOD GLUCOSE      POCT Blood Glucose.: 97 mg/dL (13 Oct 2024 12:30)  POCT Blood Glucose.: 87 mg/dL (13 Oct 2024 08:17)  POCT Blood Glucose.: 109 mg/dL (12 Oct 2024 22:08)  POCT Blood Glucose.: 108 mg/dL (12 Oct 2024 18:22)    I&O's Summary      PHYSICAL EXAM:  Vital Signs Last 24 Hrs  T(C): 36.7 (13 Oct 2024 12:06), Max: 37.2 (12 Oct 2024 20:35)  T(F): 98 (13 Oct 2024 12:06), Max: 98.9 (12 Oct 2024 20:35)  HR: 82 (13 Oct 2024 12:06) (78 - 86)  BP: 122/74 (13 Oct 2024 12:06) (122/74 - 150/76)  BP(mean): --  RR: 18 (13 Oct 2024 12:06) (17 - 18)  SpO2: 95% (13 Oct 2024 12:06) (95% - 96%)    Parameters below as of 13 Oct 2024 12:06  Patient On (Oxygen Delivery Method): room air      CONSTITUTIONAL: NAD  EYES: PERRLA; conjunctiva and sclera clear  ENMT: Moist oral mucosa, no pharyngeal injection or exudates; normal dentition  NECK: Supple, no palpable masses; no thyromegaly  RESPIRATORY: Normal respiratory effort; lungs are clear to auscultation bilaterally  CARDIOVASCULAR: Regular rate and rhythm, normal S1 and S2, no murmur/rub/gallop; No lower extremity edema; Peripheral pulses are 2+ bilaterally  ABDOMEN: Nontender to palpation, normoactive bowel sounds, no rebound/guarding; No hepatosplenomegaly  MUSCULOSKELETAL:  Normal gait; no clubbing or cyanosis of digits; no joint swelling or tenderness to palpation  PSYCH: A+O to person, place, and time; affect appropriate  NEUROLOGY: CN 2-12 are intact and symmetric; no gross sensory deficits   SKIN: No rashes; no palpable lesions    LABS:                        9.9    9.50  )-----------( 259      ( 13 Oct 2024 07:20 )             29.8     10-13    137  |  103  |  45[H]  ----------------------------<  96  3.9   |  22  |  1.77[H]    Ca    8.4      13 Oct 2024 07:20  Phos  3.8     10-13  Mg     1.80     10-13            Urinalysis Basic - ( 13 Oct 2024 07:20 )    Color: x / Appearance: x / SG: x / pH: x  Gluc: 96 mg/dL / Ketone: x  / Bili: x / Urobili: x   Blood: x / Protein: x / Nitrite: x   Leuk Esterase: x / RBC: x / WBC x   Sq Epi: x / Non Sq Epi: x / Bacteria: x        Culture - Blood (collected 11 Oct 2024 12:55)  Source: .Blood BLOOD  Preliminary Report (12 Oct 2024 17:01):    No growth at 24 hours    Culture - Blood (collected 11 Oct 2024 12:28)  Source: .Blood BLOOD  Preliminary Report (12 Oct 2024 17:01):    No growth at 24 hours        RADIOLOGY & ADDITIONAL TESTS:  Results Reviewed:   Imaging Personally Reviewed:  Electrocardiogram Personally Reviewed:    COORDINATION OF CARE:  Care Discussed with Consultants/Other Providers [Y/N]:  Prior or Outpatient Records Reviewed [Y/N]:  
[facility] Division of Hospital Medicine  Marshall Peterson DO  Available via MS Teams    SUBJECTIVE / OVERNIGHT EVENTS:  Spoke to patient in [facility]  States she was unable to sleep at night because of "anxiety" and "stress"  Understands importance of continued ABX   Requesting to shower     ADDITIONAL REVIEW OF SYSTEMS:    MEDICATIONS  (STANDING):  chlorhexidine 2% Cloths 1 Application(s) Topical daily  cholecalciferol 2000 Unit(s) Oral daily  dextrose 5%. 1000 milliLiter(s) (50 mL/Hr) IV Continuous <Continuous>  dextrose 5%. 1000 milliLiter(s) (100 mL/Hr) IV Continuous <Continuous>  dextrose 50% Injectable 25 Gram(s) IV Push once  dextrose 50% Injectable 25 Gram(s) IV Push once  dextrose 50% Injectable 12.5 Gram(s) IV Push once  ertapenem  IVPB 500 milliGRAM(s) IV Intermittent every 24 hours  glucagon  Injectable 1 milliGRAM(s) IntraMuscular once  heparin   Injectable 5000 Unit(s) SubCutaneous every 8 hours  insulin lispro (ADMELOG) corrective regimen sliding scale   SubCutaneous at bedtime  insulin lispro (ADMELOG) corrective regimen sliding scale   SubCutaneous three times a day before meals  lactated ringers. 1000 milliLiter(s) (75 mL/Hr) IV Continuous <Continuous>  lactated ringers. 1000 milliLiter(s) (75 mL/Hr) IV Continuous <Continuous>  levothyroxine 50 MICROGram(s) Oral daily  magnesium oxide 400 milliGRAM(s) Oral daily  Nephro-kyler 1 Tablet(s) Oral daily  pantoprazole    Tablet 40 milliGRAM(s) Oral before breakfast  polyethylene glycol 3350 17 Gram(s) Oral daily  prednisoLONE Syrup 3 mG/mL (PRELONE) 2.5 milliGRAM(s) Oral every 24 hours    MEDICATIONS  (PRN):  acetaminophen     Tablet .. 650 milliGRAM(s) Oral every 6 hours PRN Temp greater or equal to 38C (100.4F), Mild Pain (1 - 3)  aluminum hydroxide/magnesium hydroxide/simethicone Suspension 30 milliLiter(s) Oral every 4 hours PRN Dyspepsia  dextrose Oral Gel 15 Gram(s) Oral once PRN Blood Glucose LESS THAN 70 milliGRAM(s)/deciliter  melatonin 3 milliGRAM(s) Oral at bedtime PRN Insomnia  ondansetron Injectable 4 milliGRAM(s) IV Push every 8 hours PRN Nausea and/or Vomiting      I&O's Summary    13 Oct 2024 07:01  -  14 Oct 2024 07:00  --------------------------------------------------------  IN: 800 mL / OUT: 0 mL / NET: 800 mL        PHYSICAL EXAM:  Vital Signs Last 24 Hrs  T(C): 36.7 (14 Oct 2024 12:18), Max: 36.8 (13 Oct 2024 20:12)  T(F): 98.1 (14 Oct 2024 12:18), Max: 98.3 (13 Oct 2024 20:12)  HR: 84 (14 Oct 2024 12:18) (77 - 84)  BP: 122/80 (14 Oct 2024 12:18) (122/80 - 138/82)  BP(mean): --  RR: 18 (14 Oct 2024 12:18) (18 - 18)  SpO2: 97% (14 Oct 2024 12:18) (94% - 97%)    Parameters below as of 14 Oct 2024 12:18  Patient On (Oxygen Delivery Method): room air      CONSTITUTIONAL: NAD  EYES:  non-icteric  ENMT: Oral mucosa with moist membranes  NECK: Supple  RESP: No respiratory distress, no use of accessory muscles  CV: RRR  GI: Soft, NT, ND, no rebound, no guarding  MSK: Normal ROM without pain, no gross deformity   SKIN: No rashes or ulcers noted  NEURO: No focal deficits   PSYCH: Appropriate insight/judgment; A+O x 3    LABS:                        10.6   8.16  )-----------( 288      ( 14 Oct 2024 05:41 )             32.3     10-14    134[L]  |  102  |  52[H]  ----------------------------<  91  4.5   |  20[L]  |  1.97[H]    Ca    8.7      14 Oct 2024 05:41  Phos  4.2     10-14  Mg     2.00     10-14            Urinalysis Basic - ( 14 Oct 2024 05:41 )    Color: x / Appearance: x / SG: x / pH: x  Gluc: 91 mg/dL / Ketone: x  / Bili: x / Urobili: x   Blood: x / Protein: x / Nitrite: x   Leuk Esterase: x / RBC: x / WBC x   Sq Epi: x / Non Sq Epi: x / Bacteria: x            RADIOLOGY & ADDITIONAL TESTS:  New Imaging Personally Reviewed Today: yes  New Electrocardiogram Personally Reviewed Today: yes  Other Results Reviewed Today: yes  Prior or Outpatient Records Reviewed Today with Summary: yes    COORDINATION OF CARE:  Consultant Communication and Details of Discussion (where applicable):

## 2024-10-17 NOTE — PROGRESS NOTE ADULT - NS ATTEND AMEND GEN_ALL_CORE FT
Patient discussed in details. SCR improving approaching baseline. Continue fluids
MICHAEL-likely ATN-get suggetsed work up
as above
Plan as above. This is an maren patient likely prerenal improving on bicarb drip. Will monitor to monitor
SCr improving-monitor
as above  check cbc with diff to rule out AIN
scr stable today
Plan discussed in details. Continue plan as above MICHAEL improving

## 2024-10-17 NOTE — DISCHARGE NOTE NURSING/CASE MANAGEMENT/SOCIAL WORK - NSDCPEFALRISK_GEN_ALL_CORE
For information on Fall & Injury Prevention, visit: [email or web address] OR  [email or web address] OR  https://www.cdc.gov/steadi/patient.html

## 2024-10-17 NOTE — DISCHARGE NOTE NURSING/CASE MANAGEMENT/SOCIAL WORK - PATIENT PORTAL LINK FT
You can access the FollowMyHealth Patient Portal offered by [facility] by registering at the following website: [email or web address]. By joining [facility]’s FollowMyHealth portal, you will also be able to view your health information using other applications (apps) compatible with our system.
d/w sons - pt has had multiple people in life die and decided to stop prolixin [phone]. has not been taking meds, states that has not been taking meds, hearing voices, awake for days on end. d/w telepsych. -Bin Tuttle MD-

## 2024-10-17 NOTE — DISCHARGE NOTE NURSING/CASE MANAGEMENT/SOCIAL WORK - FINANCIAL ASSISTANCE
[facility] provides services at a reduced cost to those who are determined to be eligible through [facility]’s financial assistance program. Information regarding [facility]’s financial assistance program can be found by going to [email or web address] or by calling [phone].

## 2024-10-17 NOTE — PROGRESS NOTE ADULT - REASON FOR ADMISSION
sepsis 2/2 UTI, maren on ckd, hyponatremia, hypocalcemia

## 2024-11-03 NOTE — ED ADULT NURSE NOTE - NSHOSCREENINGQ1_ED_ALL_ED
November 3, 2024      [facility] [address]  [place] [address]  Phone: [phone]  Fax: [phone]       Patient: Isaura Mancini   YOB: 1970  Date of Visit: 11/03/2024    To Whom It May Concern:    Antonina Mancini  was at [facility] on 11/03/2024. The patient may return to work/school on 11/05/2024 with no restrictions if fever free for 24 hours. If you have any questions or concerns, or if I can be of further assistance, please do not hesitate to contact me.    Sincerely,      Isaura Llamas PA-C     
No

## 2025-05-01 ENCOUNTER — EMERGENCY (EMERGENCY)
Facility: HOSPITAL | Age: 70
LOS: 1 days | End: 2025-05-01
Admitting: STUDENT IN AN ORGANIZED HEALTH CARE EDUCATION/TRAINING PROGRAM
Payer: MEDICAID

## 2025-05-01 VITALS
HEART RATE: 64 BPM | OXYGEN SATURATION: 99 % | DIASTOLIC BLOOD PRESSURE: 81 MMHG | SYSTOLIC BLOOD PRESSURE: 129 MMHG | TEMPERATURE: 98 F | RESPIRATION RATE: 19 BRPM

## 2025-05-01 VITALS
TEMPERATURE: 98 F | RESPIRATION RATE: 17 BRPM | SYSTOLIC BLOOD PRESSURE: 128 MMHG | DIASTOLIC BLOOD PRESSURE: 81 MMHG | OXYGEN SATURATION: 94 % | HEART RATE: 85 BPM | WEIGHT: 112.44 LBS

## 2025-05-01 PROBLEM — E03.9 HYPOTHYROIDISM, UNSPECIFIED: Chronic | Status: ACTIVE | Noted: 2024-10-04

## 2025-05-01 PROBLEM — Z79.52 LONG TERM (CURRENT) USE OF SYSTEMIC STEROIDS: Chronic | Status: ACTIVE | Noted: 2024-10-04

## 2025-05-01 PROBLEM — N18.30 CHRONIC KIDNEY DISEASE, STAGE 3 UNSPECIFIED: Chronic | Status: ACTIVE | Noted: 2024-10-04

## 2025-05-01 PROBLEM — Z87.39 PERSONAL HISTORY OF OTHER DISEASES OF THE MUSCULOSKELETAL SYSTEM AND CONNECTIVE TISSUE: Chronic | Status: ACTIVE | Noted: 2024-10-04

## 2025-05-01 PROBLEM — I10 ESSENTIAL (PRIMARY) HYPERTENSION: Chronic | Status: ACTIVE | Noted: 2024-10-04

## 2025-05-01 PROBLEM — E78.5 HYPERLIPIDEMIA, UNSPECIFIED: Chronic | Status: ACTIVE | Noted: 2024-10-04

## 2025-05-01 PROBLEM — E11.9 TYPE 2 DIABETES MELLITUS WITHOUT COMPLICATIONS: Chronic | Status: ACTIVE | Noted: 2024-10-04

## 2025-05-01 LAB
ALBUMIN SERPL ELPH-MCNC: 3.7 G/DL — SIGNIFICANT CHANGE UP (ref 3.3–5)
ALP SERPL-CCNC: 69 U/L — SIGNIFICANT CHANGE UP (ref 40–120)
ALT FLD-CCNC: 9 U/L — SIGNIFICANT CHANGE UP (ref 4–33)
ANION GAP SERPL CALC-SCNC: 14 MMOL/L — SIGNIFICANT CHANGE UP (ref 7–14)
AST SERPL-CCNC: 21 U/L — SIGNIFICANT CHANGE UP (ref 4–32)
BASOPHILS # BLD AUTO: 0.07 K/UL — SIGNIFICANT CHANGE UP (ref 0–0.2)
BASOPHILS NFR BLD AUTO: 1 % — SIGNIFICANT CHANGE UP (ref 0–2)
BILIRUB SERPL-MCNC: 0.4 MG/DL — SIGNIFICANT CHANGE UP (ref 0.2–1.2)
BUN SERPL-MCNC: 45 MG/DL — HIGH (ref 7–23)
CALCIUM SERPL-MCNC: 8.5 MG/DL — SIGNIFICANT CHANGE UP (ref 8.4–10.5)
CHLORIDE SERPL-SCNC: 100 MMOL/L — SIGNIFICANT CHANGE UP (ref 98–107)
CO2 SERPL-SCNC: 18 MMOL/L — LOW (ref 22–31)
CREAT SERPL-MCNC: 1.61 MG/DL — HIGH (ref 0.5–1.3)
EGFR: 35 ML/MIN/1.73M2 — LOW
EGFR: 35 ML/MIN/1.73M2 — LOW
EOSINOPHIL # BLD AUTO: 0.33 K/UL — SIGNIFICANT CHANGE UP (ref 0–0.5)
EOSINOPHIL NFR BLD AUTO: 4.6 % — SIGNIFICANT CHANGE UP (ref 0–6)
FLUAV AG NPH QL: SIGNIFICANT CHANGE UP
FLUBV AG NPH QL: SIGNIFICANT CHANGE UP
GLUCOSE SERPL-MCNC: 95 MG/DL — SIGNIFICANT CHANGE UP (ref 70–99)
HCT VFR BLD CALC: 34.4 % — LOW (ref 34.5–45)
HGB BLD-MCNC: 11.6 G/DL — SIGNIFICANT CHANGE UP (ref 11.5–15.5)
IANC: 4.79 K/UL — SIGNIFICANT CHANGE UP (ref 1.8–7.4)
IMM GRANULOCYTES NFR BLD AUTO: 0.3 % — SIGNIFICANT CHANGE UP (ref 0–0.9)
LYMPHOCYTES # BLD AUTO: 1.48 K/UL — SIGNIFICANT CHANGE UP (ref 1–3.3)
LYMPHOCYTES # BLD AUTO: 20.5 % — SIGNIFICANT CHANGE UP (ref 13–44)
MAGNESIUM SERPL-MCNC: 2.1 MG/DL — SIGNIFICANT CHANGE UP (ref 1.6–2.6)
MCHC RBC-ENTMCNC: 29 PG — SIGNIFICANT CHANGE UP (ref 27–34)
MCHC RBC-ENTMCNC: 33.7 G/DL — SIGNIFICANT CHANGE UP (ref 32–36)
MCV RBC AUTO: 86 FL — SIGNIFICANT CHANGE UP (ref 80–100)
MONOCYTES # BLD AUTO: 0.54 K/UL — SIGNIFICANT CHANGE UP (ref 0–0.9)
MONOCYTES NFR BLD AUTO: 7.5 % — SIGNIFICANT CHANGE UP (ref 2–14)
NEUTROPHILS # BLD AUTO: 4.79 K/UL — SIGNIFICANT CHANGE UP (ref 1.8–7.4)
NEUTROPHILS NFR BLD AUTO: 66.1 % — SIGNIFICANT CHANGE UP (ref 43–77)
NRBC # BLD AUTO: 0 K/UL — SIGNIFICANT CHANGE UP (ref 0–0)
NRBC # FLD: 0 K/UL — SIGNIFICANT CHANGE UP (ref 0–0)
NRBC BLD AUTO-RTO: 0 /100 WBCS — SIGNIFICANT CHANGE UP (ref 0–0)
PHOSPHATE SERPL-MCNC: 2.9 MG/DL — SIGNIFICANT CHANGE UP (ref 2.5–4.5)
PLATELET # BLD AUTO: 258 K/UL — SIGNIFICANT CHANGE UP (ref 150–400)
POTASSIUM SERPL-MCNC: 3.3 MMOL/L — LOW (ref 3.5–5.3)
POTASSIUM SERPL-SCNC: 3.3 MMOL/L — LOW (ref 3.5–5.3)
PROT SERPL-MCNC: 6.8 G/DL — SIGNIFICANT CHANGE UP (ref 6–8.3)
RBC # BLD: 4 M/UL — SIGNIFICANT CHANGE UP (ref 3.8–5.2)
RBC # FLD: 13.6 % — SIGNIFICANT CHANGE UP (ref 10.3–14.5)
RSV RNA NPH QL NAA+NON-PROBE: SIGNIFICANT CHANGE UP
SARS-COV-2 RNA SPEC QL NAA+PROBE: SIGNIFICANT CHANGE UP
SODIUM SERPL-SCNC: 132 MMOL/L — LOW (ref 135–145)
SOURCE RESPIRATORY: SIGNIFICANT CHANGE UP
TROPONIN T, HIGH SENSITIVITY RESULT: 18 NG/L — SIGNIFICANT CHANGE UP
TROPONIN T, HIGH SENSITIVITY RESULT: 20 NG/L — SIGNIFICANT CHANGE UP
TSH SERPL-MCNC: 1.93 UIU/ML — SIGNIFICANT CHANGE UP (ref 0.27–4.2)
WBC # BLD: 7.23 K/UL — SIGNIFICANT CHANGE UP (ref 3.8–10.5)
WBC # FLD AUTO: 7.23 K/UL — SIGNIFICANT CHANGE UP (ref 3.8–10.5)

## 2025-05-01 PROCEDURE — 93010 ELECTROCARDIOGRAM REPORT: CPT

## 2025-05-01 PROCEDURE — 99285 EMERGENCY DEPT VISIT HI MDM: CPT

## 2025-05-01 PROCEDURE — 71046 X-RAY EXAM CHEST 2 VIEWS: CPT | Mod: 26

## 2025-05-01 RX ADMIN — Medication 500 MILLILITER(S): at 16:26

## 2025-05-01 RX ADMIN — Medication 500 MILLILITER(S): at 15:40

## 2025-05-01 RX ADMIN — Medication 20 MILLIEQUIVALENT(S): at 16:10

## 2025-05-01 NOTE — ED PROVIDER NOTE - PHYSICAL EXAMINATION
CONSTITUTIONAL: Well-appearing; well-nourished; in no apparent distress;  HEAD: Normocephalic, atraumatic;  EYES: PERRL, EOM intact, conjunctiva and sclera WNL;  ENT: normal nose; no rhinorrhea; unremarkable pharynx  NECK/LYMPH: Supple; non-tender;  CARD: Normal S1, S2; no murmurs, rubs, or gallops noted  RESP: Normal chest excursion with respiration; breath sounds clear and equal bilaterally; diffuse rales b/l lung fields  ABD/GI: soft, non-distended; non-tender; no palpable organomegaly, no pulsatile mass  EXT/MS: moves all extremities; distal pulses are normal, no pedal edema  SKIN: Normal for age and race; warm; dry; good turgor; no apparent lesions or exudate noted  NEURO: Awake, alert, oriented x 3, no gross deficits, CN II-XII grossly intact, no motor or sensory deficit noted, no dysmetria, no drift, 5/5 strength b/l.   PSYCH: Normal mood; appropriate affect

## 2025-05-01 NOTE — ED PROVIDER NOTE - NSFOLLOWUPINSTRUCTIONS_ED_ALL_ED_FT
You were seen in our department for lightheadedness  your Kidney function is stable  you were given 1 Liter of fluids  your potassium was milldly low and repleted  Follow up with your PMD in 48-72 hours for further monitoring.  Follow up with Nephrology within 1 week for further evaluation.  if you develop any chest pain, dizziness, high fevers, weakness, numbness, tingling, vision changes, or any worsening symptoms return to our ED for evaluation.

## 2025-05-01 NOTE — ED ADULT NURSE NOTE - OBJECTIVE STATEMENT
Patient received intake a&ox4. c/o elevated BP the past 3 days. pt reports highest read was 165. pt has not been taking BP meds bc BPS have been "low." hx CKD. pt denies chest pain, sob, n+v, headache, dizziness. RR even, unlabored. 20g IV placed to right wrist labs collected and sent. Safety maintained.

## 2025-05-01 NOTE — CONSULT NOTE ADULT - SUBJECTIVE AND OBJECTIVE BOX
[facility]   MD MEDINA WORKMAN MD ANGELA WONG, PA        TEL:  OFFICE: [phone]  From 5pm-7am answering service [phone]    --- INITIAL RENAL CONSULT NOTE ---date of service 05-01-25 @ 16:43    HPI:  70yoF PMH CKD last Cr 1.7 2/28/25, p/w 7 days of lethargy, generalized fatigue, fluctuating BPs and chest pain. pt endorses a brief transient episode of chest heaviness yesterday while at rest. lasting approx 2 hours and subsequently resolved. no sob, diaphoresis, palpitations, rodríguez, leg swelling. per daughter, endorses increasing dry cough over past 7 days. no fevers. also endorsing some lightheadedness, not room spinnning, worse upon standing/getting out of bed. no n/v/d/c, abd pain, dysuria.  pt seen in ed follows up with Dr. Cochran for ckd baseline ~ 1.5-1.7. c/o weakness x1 month but notice worsen fatigue and weakness lately hence came to ed for eval      Allergies:  ciprofloxacin (Other)  cefixime (Rash; Urticaria; Hives)      PAST MEDICAL & SURGICAL HISTORY:  Benign essential HTN      T2DM (type 2 diabetes mellitus)      HLD (hyperlipidemia)      Stage 3 chronic kidney disease      H/O migratory polyarthritis      Current chronic use of systemic steroids      Hypothyroidism      No significant past surgical history          Home Medications Reviewed    Hospital Medications:   MEDICATIONS  (STANDING):      SOCIAL HISTORY:  Denies ETOh, Smoking,     FAMILY HISTORY:  No pertinent family history in first degree relatives        REVIEW OF SYSTEMS:  CONSTITUTIONAL: No weakness, fevers or chills  EYES/ENT: No visual changes;  No vertigo or throat pain   NECK: No pain or stiffness  RESPIRATORY: No cough, wheezing, hemoptysis; No shortness of breath  CARDIOVASCULAR: No chest pain or palpitations.  GASTROINTESTINAL: No abdominal or epigastric pain. No nausea, vomiting, or hematemesis; No diarrhea or constipation. No melena or hematochezia.  GENITOURINARY: No dysuria, frequency, foamy urine, urinary urgency, incontinence or hematuria  NEUROLOGICAL: No numbness or weakness  SKIN: No itching, burning, rashes, or lesions   VASCULAR: No bilateral lower extremity edema.   All other review of systems is negative unless indicated above.    VITALS:  T(F): 98.5 (05-01-25 @ 12:45), Max: 98.5 (05-01-25 @ 12:45)  HR: 85 (05-01-25 @ 12:45)  BP: 128/81 (05-01-25 @ 12:45)  RR: 17 (05-01-25 @ 12:45)  SpO2: 94% (05-01-25 @ 12:45)  Wt(kg): --      Weight (kg): 51 (05-01 @ 12:45)    PHYSICAL EXAM:  General: NAD  HEENT: anicteric sclera, oropharynx clear, MMM  Neck: No JVD  Respiratory: CTAB, no wheezes, rales or rhonchi  Cardiovascular: S1, S2, RRR  Gastrointestinal: BS+, soft, NT/ND  Extremities: No cyanosis or clubbing. No peripheral edema  Neurological: A/O x 3, no focal deficits  Psychiatric: Normal mood, normal affect  : No CVA tenderness. No stafford.   Skin: No rashes  Vascular Access:    LABS:  05-01    132[L]  |  100  |  45[H]  ----------------------------<  95  3.3[L]   |  18[L]  |  1.61[H]    Ca    8.5      01 May 2025 13:55  Phos  2.9     05-01  Mg     2.10     05-01    TPro  6.8  /  Alb  3.7  /  TBili  0.4  /  DBili      /  AST  21  /  ALT  9   /  AlkPhos  69  05-01    Creatinine Trend: 1.61 <--                        11.6   7.23  )-----------( 258      ( 01 May 2025 13:55 )             34.4     Urine Studies:  Urinalysis Basic - ( 01 May 2025 13:55 )    Color:  / Appearance:  / SG:  / pH:   Gluc: 95 mg/dL / Ketone:   / Bili:  / Urobili:    Blood:  / Protein:  / Nitrite:    Leuk Esterase:  / RBC:  / WBC    Sq Epi:  / Non Sq Epi:  / Bacteria:           RADIOLOGY & ADDITIONAL STUDIES:

## 2025-05-01 NOTE — ED ADULT NURSE NOTE - NSFALLUNIVINTERV_ED_ALL_ED
Bed/Stretcher in lowest position, wheels locked, appropriate side rails in place/Call bell, personal items and telephone in reach/Instruct patient to call for assistance before getting out of bed/chair/stretcher/Non-slip footwear applied when patient is off stretcher/[place] to call system/Physically safe environment - no spills, clutter or unnecessary equipment/Purposeful proactive rounding/Room/bathroom lighting operational, light cord in reach

## 2025-05-01 NOTE — ED PROVIDER NOTE - OBJECTIVE STATEMENT
hx obtained from daughter per patients request for Sinhala translation  70yoF PMH CKD last Cr 1.7 2/28/25, p/w 7 days of lethargy, generalized fatigue, fluctuating BPs and chest pain. pt endorses a brief transient episode of chest heaviness yesterday while at rest. lasting approx 2 hours and subsequently resolved. no sob, diaphoresis, palpitations, rodríguez, leg swelling. per daughter, endorses increasing dry cough over past 7 days. no fevers. also endorsing some lightheadedness, not room spinnning, worse upon standing/getting out of bed. no n/v/d/c, abd pain, dysuria.

## 2025-05-01 NOTE — ED ADULT TRIAGE NOTE - CHIEF COMPLAINT QUOTE
pt with CKD, daughter took BP at home and noticed pt was hypertensive x 3 days. pt also c/o cough x 7 days. also c/o dizziness x 1 month.

## 2025-05-01 NOTE — CONSULT NOTE ADULT - ASSESSMENT
70yoF PMH CKD last Cr 1.7 2/28/25, p/w 7 days of lethargy, generalized fatigue, fluctuating BPs and chest pain. nephrology consulted for elevated scr    Chronic Kidney Disease stage 3  follows up with Dr Cochran and Dr diamond  Baseline ~ 1.5-1.7  renal function stable  monitor BMP and Urine Output.  avoid nephrotoxic agents    Hyponatremia  hx of hyponatremia in the past likely hypovolemic  s/p ivf  check urine na and osmo  monitor    hypokalemia  supplement  monitor    proteinuria  UPCR 1 gram related to UTI and or DM likely  known Arthritis and Sjogren syndrome follows rheum  being followed in office     weakness  work up per team

## 2025-05-01 NOTE — ED PROVIDER NOTE - PATIENT PORTAL LINK FT
You can access the FollowMyHealth Patient Portal offered by [facility] by registering at the following website: [email or web address]. By joining [facility]’s FollowMyHealth portal, you will also be able to view your health information using other applications (apps) compatible with our system.

## 2025-05-01 NOTE — ED PROVIDER NOTE - CLINICAL SUMMARY MEDICAL DECISION MAKING FREE TEXT BOX
hx obtained from daughter per patients request for Armenian translation  70yoF PMH CKD last Cr 1.7 2/28/25, p/w 7 days of lethargy, generalized fatigue, fluctuating BPs and chest pain. pt endorses a brief transient episode of chest heaviness yesterday while at rest. lasting approx 2 hours and subsequently resolved. no sob, diaphoresis, palpitations, rodríguez, leg swelling. per daughter, endorses increasing dry cough over past 7 days. no fevers. also endorsing some lightheadedness, not room spinnning, worse upon standing/getting out of bed. no n/v/d/c, abd pain, dysuria.    atypical CP, but will r/o acs  tsh   electrolytes to r/o metabolic derangements, worsening MICHAEL on CKD  cxr to r/o PNA  orthostatic HOTN possible  gentle hydration

## 2025-05-01 NOTE — ED PROVIDER NOTE - PROGRESS NOTE DETAILS
pt ambulatory, neuro intact, steady gait. seen by nephro. no emergent indications to admit this patient. famiyl member amenable to take family home. BP / vitals remains table. will dc

## 2025-07-30 NOTE — DIETITIAN INITIAL EVALUATION ADULT - SIGNS/SYMPTOMS
Anesthesia Pre Eval Note    Anesthesia ROS/Med Hx        Anesthetic Complication History:    Patient does not have a history of anesthetic complications      Pulmonary Review:  Patient does not have a pulmonary history    The patient is not a smoker.    Neuro/Psych Review:    Negative for seizures   Negative for CVA  Positive for headaches    Cardiovascular Review:   Exercise tolerance: good (>4 METS)  - Comment: Patient is able to climb 2 flights of stairs    Negative for past MI  Negative for angina  Negative for dysrhythmias  Negative for FERRO/SOB  Positive for hypertension  Positive for hyperlipidemia    GI/HEPATIC/RENAL Review:  Patient does not have a GI/hepatic/renalhistory       End/Other Review:  Patient does not have an endo/other history    Negative for obesity   Additional Results:      ALLERGIES:  No Known Allergies   Past Medical History:  No date: At risk for breast cancer  No date: Family history of breast cancer  No date: HTN (hypertension)  No date: Hyperlipidemia  No date: Migraine headache  No date: Osteopenia  No date: Skin macule      Comment:  atypical, changing in appearance  No date: Varicose veins  Past Surgical History:  09/12/2012: Colonoscopy      Comment:  Repeat in 10 years  06/10/2024: Colonoscopy w/ polypectomy      Comment:  Repeat in 5 years per Dr. Jackson.  No date: Excision of lingual tonsil  No date: Tubal ligation   Prior to Admission medications :  Medication amLODIPine (NORVASC) 5 MG tablet, Sig Take 1 tablet by mouth daily., Start Date 7/24/25, End Date , Taking? Yes, Authorizing Provider Hitesh Gonzalez MD    Medication MAGNESIUM OXIDE PO, Sig Take 200 mg by mouth daily. Indications: Takes every other day, Start Date , End Date , Taking? Yes, Authorizing Provider Provider, Outside    Medication Multiple Vitamins-Minerals (MULTIVITAL PO), Sig Take  by mouth., Start Date , End Date , Taking? Yes, Authorizing Provider Provider, Outside    Medication aspirin 81 MG tablet,  Sig Take 81 mg by mouth daily., Start Date , End Date , Taking? Yes, Authorizing Provider Provider, Outside    Medication calcium carbonate (OS-MELECIO) 600 MG TABS, Sig Take 1,200 mg by mouth., Start Date 5/22/14, End Date , Taking? Yes, Authorizing Provider Hitesh Gonzalez MD    Medication Cholecalciferol (VITAMIN D3) 2000 UNITS TABS, Sig Take 1 tablet by mouth daily., Start Date , End Date , Taking? Yes, Authorizing Provider Provider, Outside    Medication alendronate (FOSAMAX) 70 MG tablet, Sig Take 1 tablet by mouth every 7 days., Start Date 7/24/25, End Date , Taking? , Authorizing Provider Hitesh Gonzalez MD    Medication prednisoLONE acetate (PRED FORTE) 1 % ophthalmic suspension, Sig Place 1 drop into left eye in the morning and 1 drop at noon and 1 drop in the evening. Start drops AFTER surgery and continue until gone., Start Date 4/15/25, End Date , Taking? , Authorizing Provider Dusty Estrada MD    Medication prednisoLONE acetate (PRED FORTE) 1 % ophthalmic suspension, Sig Place 1 drop into right eye in the morning and 1 drop at noon and 1 drop in the evening. Start drops AFTER surgery and continue until gone., Start Date 4/15/25, End Date , Taking? , Authorizing Provider Dusty Estrada MD            Physical Exam     Airway   Mallampati: II  TM Distance: >3 FB  Neck ROM: Full  Neck: Thick    Cardiovascular  Cardiovascular exam normal  Cardio Rhythm: Regular  Cardio Rate: Normal    General Assessment  General Assessment: No acute distress    Dental Exam    Patient has:  Denied broken/chipped/loose teeth    Pulmonary Exam  Pulmonary exam normal  Breath sounds clear to auscultation:  Yes      Vitals:   No data found.      Anesthesia Plan:    ASA Status: 2  Anesthesia Type: MAC    Checklist  Reviewed: NPO Status, Problem list, Medications, Allergies, Past Med History, Patient Summary and Lab Results  Consent/Risks Discussed Statement:  The proposed anesthetic plan, including its risks and  benefits, have been discussed with the Patient along with the risks and benefits of alternatives. Questions were encouraged and answered and the patient and/or representative understands and agrees to proceed.        I discussed with the patient (and/or patient's legal representative) the risks and benefits of the proposed anesthesia plan, MAC, which may include services performed by other anesthesia providers.    Alternative anesthesia plans, if available, were reviewed with the patient (and/or patient's legal representative). Discussion has been held with the patient (and/or patient's legal representative) regarding risks of anesthesia, which include Intra-operative Awareness and emergent situations that may require change in anesthesia plan.    The patient (and/or patient's legal representative) has indicated understanding, his/her questions have been answered, and he/she wishes to proceed with the planned anesthetic.    Blood Products: Not Anticipated     Mild muscle and fat loss mild edema